# Patient Record
Sex: FEMALE | Race: BLACK OR AFRICAN AMERICAN | Employment: OTHER | URBAN - METROPOLITAN AREA
[De-identification: names, ages, dates, MRNs, and addresses within clinical notes are randomized per-mention and may not be internally consistent; named-entity substitution may affect disease eponyms.]

---

## 2017-01-30 ENCOUNTER — HOSPITAL ENCOUNTER (OUTPATIENT)
Dept: LAB | Age: 82
Discharge: HOME | End: 2017-01-30

## 2017-01-30 ENCOUNTER — OFFICE VISIT (OUTPATIENT)
Dept: INTERNAL MEDICINE CLINIC | Age: 82
End: 2017-01-30

## 2017-01-30 VITALS
OXYGEN SATURATION: 99 % | WEIGHT: 126 LBS | DIASTOLIC BLOOD PRESSURE: 63 MMHG | HEIGHT: 60 IN | TEMPERATURE: 98.5 F | SYSTOLIC BLOOD PRESSURE: 143 MMHG | BODY MASS INDEX: 24.74 KG/M2 | RESPIRATION RATE: 18 BRPM | HEART RATE: 71 BPM

## 2017-01-30 DIAGNOSIS — L60.2 LONG TOENAIL: ICD-10-CM

## 2017-01-30 DIAGNOSIS — R79.89 LOW TSH LEVEL: ICD-10-CM

## 2017-01-30 DIAGNOSIS — E87.0 HYPERNATREMIA: Primary | ICD-10-CM

## 2017-01-30 PROCEDURE — 99001 SPECIMEN HANDLING PT-LAB: CPT | Performed by: NURSE PRACTITIONER

## 2017-01-30 NOTE — PROGRESS NOTES
1. Have you been to the ER, urgent care clinic since your last visit? Hospitalized since your last visit? No    2. Have you seen or consulted any other health care providers outside of the [facility] since your last visit? Include any pap smears or colon screening.  No     Pt is here for   Chief Complaint   Patient presents with    Referral Request     pt states she would like a referral to Podiatry for her nails    Labs     pt states she's here for labs     Pt denies pain at this time

## 2017-01-30 NOTE — PROGRESS NOTES
Kris Jaeger is a 80 y.o. female and presents with Referral Request (pt states she would like a referral to Podiatry for her nails) and Labs (pt states she's here for labs)    Subjective:  Pt here to f/u labs. No HA, SOB, CP, or leg swelling. Continues to have knee pain and stiffness. Taking tramadol as needed for pain. Did NOT get lab letter, would like to review results today. Also needs referral to podiatry for toenails, very long and thick. Denies palpitations, skin/bowel changes, and fatigue; r/t low TSH finding. Review of Systems  Constitutional: negative for fevers, chills, anorexia and weight loss  Respiratory:  negative for cough, hemoptysis, dyspnea, and wheezing  CV:   negative for chest pain, palpitations, and lower extremity edema  GI:   negative for nausea, vomiting, diarrhea, abdominal pain, and melena  Endo:               negative for polyuria,polydipsia,polyphagia, and heat intolerance  Genitourinary: negative for frequency, urgency, dysuria, retention, and hematuria  Integument:  negative for rash, ulcerations  Hematologic:  negative for easy bruising and bleeding  Musculoskel: + arthralgias.  negative for muscle weakness  Neurological:  negative for headaches, dizziness, vertigo,and memory problems  Behavl/Psych: negative for feelings of anxiety, depression, suicide, and mood changes    Past Medical History   Diagnosis Date    Cancer ([facility] [address]      right breast.    Chronic pain     CVA     HTN (hypertension) 2/9/2011    Hypertension     Microscopic hematuria 5/11/2011    Other and unspecified hyperlipidemia 2/9/2011    Subclinical hyperthyroidism 8/29/2015    Venous thrombosis 2/17/2010     left arm DVT     Past Surgical History   Procedure Laterality Date    Hx hemorrhoidectomy      Hx heent      Pr breast surgery procedure unlisted       Social History     Social History    Marital status:      Spouse name: N/A    Number of children: N/A    Years of education: N/A Social History Main Topics    Smoking status: Former Smoker    Smokeless tobacco: Never Used      Comment: smoked for 10 yrs    Alcohol use 0.5 oz/week     1 Cans of beer per week      Comment: occ    Drug use: No    Sexual activity: No     Other Topics Concern    None     Social History Narrative     Family History   Problem Relation Age of Onset    Stroke Mother     Stroke Father     Diabetes Daughter      Current Outpatient Prescriptions   Medication Sig Dispense Refill    traMADol (ULTRAM) 50 mg tablet Take 1 Tab by mouth daily as needed for Pain. 30 Tab 5    ergocalciferol (ERGOCALCIFEROL) 50,000 unit capsule Take 1 Cap by mouth every seven (7) days. 12 Cap 4    polyethylene glycol (MIRALAX) 17 gram packet Take 1 Packet by mouth two (2) times daily as needed. 60 Packet 11    lactulose (CHRONULAC) 10 gram/15 mL solution Take 15 mL by mouth two (2) times daily as needed. For severe CONSTIPATION  Indications: CONSTIPATION 480 mL 1    ranitidine (ZANTAC) 150 mg tablet Take 1 Tab by mouth two (2) times daily as needed for Indigestion. Take for acid reflux 60 Tab 11    simvastatin (ZOCOR) 20 mg tablet Take 1 Tab by mouth nightly. 30 Tab 5    amLODIPine (NORVASC) 5 mg tablet Take 1 Tab by mouth daily. 30 Tab 11    metoprolol succinate (TOPROL-XL) 50 mg XL tablet Take 1 Tab by mouth daily. 30 Tab 11    lisinopril (PRINIVIL, ZESTRIL) 20 mg tablet Take 1 Tab by mouth daily. 30 Tab 11    gabapentin (NEURONTIN) 100 mg capsule TAKE 1 CAP BY MOUTH NIGHTLY AS NEEDED. 20 Cap 1    aspirin delayed-release 81 mg tablet Take 1 Tab by mouth daily. 90 Tab 3    mometasone (ELOCON) 0.1 % topical cream Apply  to affected area daily. 15 g 1    hydrocortisone (ANUSOL-HC) 2.5 % rectal cream Insert  into rectum four (4) times daily.  45 g 5     No Known Allergies    Objective:  Visit Vitals    /63 (BP 1 Location: Right arm, BP Patient Position: Sitting)    Pulse 71    Temp 98.5 °F (36.9 °C) (Oral)    Resp 18    Ht 5' (1.524 m)    Wt 126 lb (57.2 kg)    SpO2 99%    BMI 24.61 kg/m2     Physical Exam:    General appearance - alert, well appearing, and in no distress. Seated comfortably in W/C. Mental status - A/O x 4, normal mood and affect. Neck -Supple ,normal CSP. FROM, non-tender. No significant adenopathy/thyromegaly. No JVD. Chest - CTA. Symmetric chest rise. No wheezing, rales or rhonchi. Heart - Normal rate, regular rhythm. Normal S1, S2. No MGR or clicks. Abdomen - Soft,non-distended. Normoactive BS in all quadrants. NT, no mass or HSM. No CVAT. Ext- Radial, DP pulses, 2+ bilaterally. No pedal edema, clubbing, or cyanosis. Skin-Warm and dry. No clubbing or cyanosis. Hyperpigmentation to multiple nevi and skin tags. Neuro - Normal speech, no focal findings or movement disorder. Normal strength, gait, and muscle tone. Assessment/Plan:  See below for other orders   Follow-up Disposition:  Return in about 3 months (around 4/30/2017) for 3 month f/u and labs. ICD-10-CM ICD-9-CM    1. Hypernatremia G19.7 557.0 METABOLIC PANEL, BASIC   2. Low TSH level R94.6 794.5    3. Long toenail L60.9 703.8 REFERRAL TO PODIATRY     Orders Placed This Encounter    METABOLIC PANEL, BASIC    REFERRAL TO PODIATRY     Referral Priority:   Routine     Referral Type:   Consultation     Referral Reason:   Specialty Services Required     Referral Location:   [facility]     Referred to Provider:   Aurelia Haller, DPM [place] [facility] expressed understanding of plan. An After Visit Summary was offered/printed and given to the patient.

## 2017-01-30 NOTE — PATIENT INSTRUCTIONS
Low Sodium Diet (2,000 Milligram): Care Instructions  Your Care Instructions  Too much sodium causes your body to hold on to extra water. This can raise your blood pressure and force your heart and kidneys to work harder. In very serious cases, this could cause you to be put in the hospital. It might even be life-threatening. By limiting sodium, you will feel better and lower your risk of serious problems. The most common source of sodium is salt. People get most of the salt in their diet from canned, prepared, and packaged foods. Fast food and restaurant meals also are very high in sodium. Your doctor will probably limit your sodium to less than 2,000 milligrams (mg) a day. This limit counts all the sodium in prepared and packaged foods and any salt you add to your food. Follow-up care is a key part of your treatment and safety. Be sure to make and go to all appointments, and call your doctor if you are having problems. It's also a good idea to know your test results and keep a list of the medicines you take. How can you care for yourself at home? Read food labels  · Read labels on cans and food packages. The labels tell you how much sodium is in each serving. Make sure that you look at the serving size. If you eat more than the serving size, you have eaten more sodium. · Food labels also tell you the Percent Daily Value for sodium. Choose products with low Percent Daily Values for sodium. · Be aware that sodium can come in forms other than salt, including monosodium glutamate (MSG), sodium citrate, and sodium bicarbonate (baking soda). MSG is often added to Asian food. When you eat out, you can sometimes ask for food without MSG or added salt. Buy low-sodium foods  · Buy foods that are labeled \"unsalted\" (no salt added), \"sodium-free\" (less than 5 mg of sodium per serving), or \"low-sodium\" (less than 140 mg of sodium per serving).  Foods labeled \"reduced-sodium\" and \"light sodium\" may still have too much sodium. Be sure to read the label to see how much sodium you are getting. · Buy fresh vegetables, or frozen vegetables without added sauces. Buy low-sodium versions of canned vegetables, soups, and other canned goods. Prepare low-sodium meals  · Cut back on the amount of salt you use in cooking. This will help you adjust to the taste. Do not add salt after cooking. One teaspoon of salt has about 2,300 mg of sodium. · Take the salt shaker off the table. · Flavor your food with garlic, lemon juice, onion, vinegar, herbs, and spices. Do not use soy sauce, lite soy sauce, steak sauce, onion salt, garlic salt, celery salt, mustard, or ketchup on your food. · Use low-sodium salad dressings, sauces, and ketchup. Or make your own salad dressings and sauces without adding salt. · Use less salt (or none) when recipes call for it. You can often use half the salt a recipe calls for without losing flavor. Other foods such as rice, pasta, and grains do not need added salt. · Rinse canned vegetables, and cook them in fresh water. This removes some--but not all--of the salt. · Avoid water that is naturally high in sodium or that has been treated with water softeners, which add sodium. Call your local water company to find out the sodium content of your water supply. If you buy bottled water, read the label and choose a sodium-free brand. Avoid high-sodium foods  · Avoid eating:  ¨ Smoked, cured, salted, and canned meat, fish, and poultry. ¨ Ham, corona, hot dogs, and luncheon meats. ¨ Regular, hard, and processed cheese and regular peanut butter. ¨ Crackers with salted tops, and other salted snack foods such as pretzels, chips, and salted popcorn. ¨ Frozen prepared meals, unless labeled low-sodium. ¨ Canned and dried soups, broths, and bouillon, unless labeled sodium-free or low-sodium. ¨ Canned vegetables, unless labeled sodium-free or low-sodium. ¨ [employer] fries, pizza, tacos, and other fast foods.   Andrew Apple, olives, ketchup, and other condiments, especially soy sauce, unless labeled sodium-free or low-sodium. Where can you learn more? Go to http://paola-shane.info/. Enter L831 in the search box to learn more about \"Low Sodium Diet (2,000 Milligram): Care Instructions. \"  Current as of: July 26, 2016  Content Version: 11.1  © 4176-0767 [employer]. Care instructions adapted under license by [employer] (which disclaims liability or warranty for this information). If you have questions about a medical condition or this instruction, always ask your healthcare professional. [address] any warranty or liability for your use of this information. Thyroid-Stimulating Hormone (TSH) Test: About This Test  What is it? A thyroid-stimulating hormone (TSH) test is one of several blood tests used to check for thyroid gland problems. TSH causes the thyroid gland to make other important hormones that help control your body's metabolism. Why is this test done? This test is done to:  · Find out whether the thyroid gland is working properly. · Find out if a problem with the thyroid is causing symptoms such as tiredness, weight gain, or weight loss. · Keep track of how well thyroid treatment is working. How can you prepare for the test?  · In general, you dont need to prepare before having this test. Your doctor may give you some specific instructions. What happens during the test?  · A health professional takes a sample of your blood. What else should you know about the test?  · This test may be done at the same time as tests to measure other thyroid hormones. · Your results will include an explanation of what a \"normal\" result is. This is called a \"reference range. \" It is just a guide. Your doctor will evaluate your results based on your health and other factors.  This means that a value that falls outside the normal values listed may still be normal for you. How long does the test take? · The test will take a few minutes. What happens after the test?  · You will probably be able to go home right away. · You can go back to your usual activities right away. Follow-up care is a key part of your treatment and safety. Be sure to make and go to all appointments, and call your doctor if you are having problems. It's also a good idea to keep a list of the medicines you take. Ask your doctor when you can expect to have your test results. Where can you learn more? Go to http://paola-shane.info/. Enter F716 in the search box to learn more about \"Thyroid-Stimulating Hormone (TSH) Test: About This Test.\"  Current as of: July 28, 2016  Content Version: 11.1  © 4405-5001 [employer]. Care instructions adapted under license by [employer] (which disclaims liability or warranty for this information). If you have questions about a medical condition or this instruction, always ask your healthcare professional. [place] [address] any warranty or liability for your use of this information.

## 2017-01-30 NOTE — MR AVS SNAPSHOT
Visit Information Date & Time Provider Department Dept. Phone Encounter #  
 1/30/2017 11:00 AM Rommel Prabhakar NP [address] [facility] [phone] [id number] Follow-up Instructions Return in about 3 months (around 4/30/2017) for 3 month f/u and labs. Upcoming Health Maintenance Date Due  
 MEDICARE YEARLY EXAM 11/30/2017 GLAUCOMA SCREENING Q2Y 9/12/2018 DTaP/Tdap/Td series (2 - Td) 9/16/2026 Allergies as of 1/30/2017  Review Complete On: 1/30/2017 By: Eleni Vergara. DAPHNE Bar No Known Allergies Current Immunizations  Never Reviewed No immunizations on file. Not reviewed this visit You Were Diagnosed With   
  
 Codes Comments Hypernatremia    -  Primary ICD-10-CM: E87.0 ICD-9-CM: 276.0 Low TSH level     ICD-10-CM: R94.6 ICD-9-CM: 794.5 Long toenail     ICD-10-CM: L60.9 ICD-9-CM: 703.8 Vitals BP Pulse Temp Resp Height(growth percentile) Weight(growth percentile) 143/63 (BP 1 Location: Right arm, BP Patient Position: Sitting) 71 98.5 °F (36.9 °C) (Oral) 18 5' (1.524 m) 126 lb (57.2 kg) SpO2 BMI OB Status Smoking Status 99% 24.61 kg/m2 Postmenopausal Former Smoker Vitals History BMI and BSA Data Body Mass Index Body Surface Area  
 24.61 kg/m 2 1.56 m 2 Preferred Pharmacy Pharmacy Name Phone [phone] [phone] Your Updated Medication List  
  
   
This list is accurate as of: 1/30/17 12:13 PM.  Always use your most recent med list. amLODIPine 5 mg tablet Commonly known as:  Bibiana Fraction Take 1 Tab by mouth daily. aspirin delayed-release 81 mg tablet Take 1 Tab by mouth daily. ergocalciferol 50,000 unit capsule Commonly known as:  ERGOCALCIFEROL Take 1 Cap by mouth every seven (7) days. gabapentin 100 mg capsule Commonly known as:  NEURONTIN  
 TAKE 1 CAP BY MOUTH NIGHTLY AS NEEDED. hydrocortisone 2.5 % rectal cream  
Commonly known as:  ANUSOL-HC Insert  into rectum four (4) times daily. lactulose 10 gram/15 mL solution Commonly known as:  Charmian Goltz Take 15 mL by mouth two (2) times daily as needed. For severe CONSTIPATION  Indications: CONSTIPATION  
  
 lisinopril 20 mg tablet Commonly known as:  Katlyn Posey Take 1 Tab by mouth daily. metoprolol succinate 50 mg XL tablet Commonly known as:  TOPROL-XL Take 1 Tab by mouth daily. mometasone 0.1 % topical cream  
Commonly known as:  Yevonne Littler Apply  to affected area daily. polyethylene glycol 17 gram packet Commonly known as:  Rodriguez Jie Take 1 Packet by mouth two (2) times daily as needed. raNITIdine 150 mg tablet Commonly known as:  ZANTAC Take 1 Tab by mouth two (2) times daily as needed for Indigestion. Take for acid reflux  
  
 simvastatin 20 mg tablet Commonly known as:  ZOCOR Take 1 Tab by mouth nightly. traMADol 50 mg tablet Commonly known as:  ULTRAM  
Take 1 Tab by mouth daily as needed for Pain. We Performed the Following METABOLIC PANEL, BASIC [45259 CPT(R)] REFERRAL TO PODIATRY [REF90 Custom] Comments:  
 Diabetic foot care Follow-up Instructions Return in about 3 months (around 4/30/2017) for 3 month f/u and labs. Referral Information Referral ID Referred By Referred To  
  
 [id number] Linda Cardenas I [facility] [address] [place], [address] Visits Status Start Date End Date 1 New Request 1/30/17 1/30/18 If your referral has a status of pending review or denied, additional information will be sent to support the outcome of this decision. Patient Instructions Low Sodium Diet (2,000 Milligram): Care Instructions Your Care Instructions Too much sodium causes your body to hold on to extra water.  This can raise your blood pressure and force your heart and kidneys to work harder. In very serious cases, this could cause you to be put in the hospital. It might even be life-threatening. By limiting sodium, you will feel better and lower your risk of serious problems. The most common source of sodium is salt. People get most of the salt in their diet from canned, prepared, and packaged foods. Fast food and restaurant meals also are very high in sodium. Your doctor will probably limit your sodium to less than 2,000 milligrams (mg) a day. This limit counts all the sodium in prepared and packaged foods and any salt you add to your food. Follow-up care is a key part of your treatment and safety. Be sure to make and go to all appointments, and call your doctor if you are having problems. It's also a good idea to know your test results and keep a list of the medicines you take. How can you care for yourself at home? Read food labels · Read labels on cans and food packages. The labels tell you how much sodium is in each serving. Make sure that you look at the serving size. If you eat more than the serving size, you have eaten more sodium. · Food labels also tell you the Percent Daily Value for sodium. Choose products with low Percent Daily Values for sodium. · Be aware that sodium can come in forms other than salt, including monosodium glutamate (MSG), sodium citrate, and sodium bicarbonate (baking soda). MSG is often added to Asian food. When you eat out, you can sometimes ask for food without MSG or added salt. Buy low-sodium foods · Buy foods that are labeled \"unsalted\" (no salt added), \"sodium-free\" (less than 5 mg of sodium per serving), or \"low-sodium\" (less than 140 mg of sodium per serving). Foods labeled \"reduced-sodium\" and \"light sodium\" may still have too much sodium. Be sure to read the label to see how much sodium you are getting. · Buy fresh vegetables, or frozen vegetables without added sauces.  Buy low-sodium versions of canned vegetables, soups, and other canned goods. Prepare low-sodium meals · Cut back on the amount of salt you use in cooking. This will help you adjust to the taste. Do not add salt after cooking. One teaspoon of salt has about 2,300 mg of sodium. · Take the salt shaker off the table. · Flavor your food with garlic, lemon juice, onion, vinegar, herbs, and spices. Do not use soy sauce, lite soy sauce, steak sauce, onion salt, garlic salt, celery salt, mustard, or ketchup on your food. · Use low-sodium salad dressings, sauces, and ketchup. Or make your own salad dressings and sauces without adding salt. · Use less salt (or none) when recipes call for it. You can often use half the salt a recipe calls for without losing flavor. Other foods such as rice, pasta, and grains do not need added salt. · Rinse canned vegetables, and cook them in fresh water. This removes somebut not allof the salt. · Avoid water that is naturally high in sodium or that has been treated with water softeners, which add sodium. Call your local water company to find out the sodium content of your water supply. If you buy bottled water, read the label and choose a sodium-free brand. Avoid high-sodium foods · Avoid eating: ¨ Smoked, cured, salted, and canned meat, fish, and poultry. ¨ Ham, corona, hot dogs, and luncheon meats. ¨ Regular, hard, and processed cheese and regular peanut butter. ¨ Crackers with salted tops, and other salted snack foods such as pretzels, chips, and salted popcorn. ¨ Frozen prepared meals, unless labeled low-sodium. ¨ Canned and dried soups, broths, and bouillon, unless labeled sodium-free or low-sodium. ¨ Canned vegetables, unless labeled sodium-free or low-sodium. ¨ Western Tamar fries, pizza, tacos, and other fast foods. ¨ Pickles, olives, ketchup, and other condiments, especially soy sauce, unless labeled sodium-free or low-sodium. Where can you learn more? Go to http://paola-shane.info/. Enter P907 in the search box to learn more about \"Low Sodium Diet (2,000 Milligram): Care Instructions. \" Current as of: July 26, 2016 Content Version: 11.1 © 9042-3972 [employer]. Care instructions adapted under license by [employer] (which disclaims liability or warranty for this information). If you have questions about a medical condition or this instruction, always ask your healthcare professional. [address] any warranty or liability for your use of this information. Thyroid-Stimulating Hormone (TSH) Test: About This Test 
What is it? A thyroid-stimulating hormone (TSH) test is one of several blood tests used to check for thyroid gland problems. TSH causes the thyroid gland to make other important hormones that help control your body's metabolism. Why is this test done? This test is done to: · Find out whether the thyroid gland is working properly. · Find out if a problem with the thyroid is causing symptoms such as tiredness, weight gain, or weight loss. · Keep track of how well thyroid treatment is working. How can you prepare for the test? 
· In general, you dont need to prepare before having this test. Your doctor may give you some specific instructions. What happens during the test? 
· A health professional takes a sample of your blood. What else should you know about the test? 
· This test may be done at the same time as tests to measure other thyroid hormones. · Your results will include an explanation of what a \"normal\" result is. This is called a \"reference range. \" It is just a guide. Your doctor will evaluate your results based on your health and other factors. This means that a value that falls outside the normal values listed may still be normal for you. How long does the test take? · The test will take a few minutes.  
What happens after the test? 
 · You will probably be able to go home right away. · You can go back to your usual activities right away. Follow-up care is a key part of your treatment and safety. Be sure to make and go to all appointments, and call your doctor if you are having problems. It's also a good idea to keep a list of the medicines you take. Ask your doctor when you can expect to have your test results. Where can you learn more? Go to http://paola-shane.info/. Enter P897 in the search box to learn more about \"Thyroid-Stimulating Hormone (TSH) Test: About This Test.\" Current as of: July 28, 2016 Content Version: 11.1 © 1527-8180 [employer]. Care instructions adapted under license by [employer] (which disclaims liability or warranty for this information). If you have questions about a medical condition or this instruction, always ask your healthcare professional. [address] any warranty or liability for your use of this information. Introducing [facility]! [facility] introduces "[employer] patient portal. Now you can access parts of your medical record, email your doctor's office, and request medication refills online. 1. In your internet browser, go to [email or web address] [email or web address] Finance [email or web address] 2. Click on the First Time User? Click Here link in the Sign In box. You will see the New Member Sign Up page. 3. Enter your "[employer] Access Code exactly as it appears below. You will not need to use this code after youve completed the sign-up process. If you do not sign up before the expiration date, you must request a new code. · "[employer] Access Code: [email or web address] Expires: 4/27/2017  3:26 PM 
 
4. Enter the last four digits of your Social Security Number (xxxx) and Date of Birth (mm/dd/yyyy) as indicated and click Submit. You will be taken to the next sign-up page. 5. Create a "[employer] ID.  This will be your "[employer] login ID and cannot be changed, so think of one that is secure and easy to remember. 6. Create a Zackfire.[email or web address] password. You can change your password at any time. 7. Enter your Password Reset Question and Answer. This can be used at a later time if you forget your password. 8. Enter your e-mail address. You will receive e-mail notification when new information is available in [address] 9. Click Sign Up. You can now view and download portions of your medical record. 10. Click the Download Summary menu link to download a portable copy of your medical information. If you have questions, please visit the Frequently Asked Questions section of the [email or web address] website. Remember, [email or web address] is NOT to be used for urgent needs. For medical emergencies, dial 911. Now available from your iPhone and Android! Please provide this summary of care documentation to your next provider. Your primary care clinician is listed as CATHRYN Anderson. If you have any questions after today's visit, please call [phone].

## 2017-01-31 DIAGNOSIS — R79.89 LOW TSH LEVEL: Primary | ICD-10-CM

## 2017-01-31 LAB
BUN SERPL-MCNC: 11 MG/DL (ref 8–27)
BUN/CREAT SERPL: 19 (ref 11–26)
CALCIUM SERPL-MCNC: 9.2 MG/DL (ref 8.7–10.3)
CHLORIDE SERPL-SCNC: 103 MMOL/L (ref 96–106)
CO2 SERPL-SCNC: 26 MMOL/L (ref 18–29)
CREAT SERPL-MCNC: 0.59 MG/DL (ref 0.57–1)
GLUCOSE SERPL-MCNC: 103 MG/DL (ref 65–99)
POTASSIUM SERPL-SCNC: 4.1 MMOL/L (ref 3.5–5.2)
SODIUM SERPL-SCNC: 143 MMOL/L (ref 134–144)
T3 SERPL-MCNC: 198 NG/DL (ref 71–180)
T4 FREE SERPL-MCNC: 2.26 NG/DL (ref 0.82–1.77)
THYROGLOB AB SERPL-ACNC: <1 IU/ML (ref 0–0.9)
THYROPEROXIDASE AB SERPL-ACNC: 8 IU/ML (ref 0–34)
TSH SERPL DL<=0.005 MIU/L-ACNC: <0.006 UIU/ML (ref 0.45–4.5)

## 2017-02-03 ENCOUNTER — TELEPHONE (OUTPATIENT)
Dept: INTERNAL MEDICINE CLINIC | Age: 82
End: 2017-02-03

## 2017-02-03 NOTE — TELEPHONE ENCOUNTER
Pt has appt to see  Dr. Gopi Shaw on 2/10/17 @ 11:00am for thyroid problem and needs Placido Holloway referral please.  Pt # [phone]

## 2017-02-13 DIAGNOSIS — E78.2 MIXED HYPERLIPIDEMIA: ICD-10-CM

## 2017-02-13 RX ORDER — SIMVASTATIN 20 MG/1
20 TABLET, FILM COATED ORAL
Qty: 30 TAB | Refills: 11 | Status: SHIPPED | OUTPATIENT
Start: 2017-02-13 | End: 2018-03-16 | Stop reason: SDUPTHER

## 2017-04-10 RX ORDER — LISINOPRIL 20 MG/1
20 TABLET ORAL DAILY
Qty: 90 TAB | Refills: 0 | Status: SHIPPED | OUTPATIENT
Start: 2017-04-10 | End: 2017-07-17 | Stop reason: SDUPTHER

## 2017-05-01 ENCOUNTER — OFFICE VISIT (OUTPATIENT)
Dept: INTERNAL MEDICINE CLINIC | Age: 82
End: 2017-05-01

## 2017-05-01 VITALS
RESPIRATION RATE: 18 BRPM | DIASTOLIC BLOOD PRESSURE: 76 MMHG | BODY MASS INDEX: 25.72 KG/M2 | SYSTOLIC BLOOD PRESSURE: 148 MMHG | HEIGHT: 60 IN | WEIGHT: 131 LBS | HEART RATE: 82 BPM | TEMPERATURE: 98.2 F | OXYGEN SATURATION: 97 %

## 2017-05-01 DIAGNOSIS — K59.01 SLOW TRANSIT CONSTIPATION: ICD-10-CM

## 2017-05-01 DIAGNOSIS — M25.561 BILATERAL CHRONIC KNEE PAIN: ICD-10-CM

## 2017-05-01 DIAGNOSIS — Z71.89 ADVANCE CARE PLANNING: ICD-10-CM

## 2017-05-01 DIAGNOSIS — I10 ESSENTIAL HYPERTENSION: ICD-10-CM

## 2017-05-01 DIAGNOSIS — M25.562 BILATERAL CHRONIC KNEE PAIN: ICD-10-CM

## 2017-05-01 DIAGNOSIS — Z00.00 MEDICARE ANNUAL WELLNESS VISIT, SUBSEQUENT: Primary | ICD-10-CM

## 2017-05-01 DIAGNOSIS — G89.29 BILATERAL CHRONIC KNEE PAIN: ICD-10-CM

## 2017-05-01 RX ORDER — ANASTROZOLE 1 MG/1
TABLET ORAL
Refills: 3 | COMMUNITY
Start: 2017-03-13 | End: 2021-01-27 | Stop reason: SDUPTHER

## 2017-05-01 RX ORDER — LACTULOSE 10 G/15ML
10 SOLUTION ORAL; RECTAL
Qty: 480 ML | Refills: 1 | Status: SHIPPED | OUTPATIENT
Start: 2017-05-01 | End: 2017-07-17 | Stop reason: SDUPTHER

## 2017-05-01 RX ORDER — DOCUSATE SODIUM 100 MG/1
CAPSULE, LIQUID FILLED ORAL
Refills: 2 | COMMUNITY
Start: 2017-03-13 | End: 2017-07-17 | Stop reason: SDUPTHER

## 2017-05-01 NOTE — PATIENT INSTRUCTIONS
Eat a balanced diet, low-carb, low-salt AND exercise at least 150 minutes per week of moderate activity. If you begin to feel short of breath, dizzy, lightheaded, or have chest pain; STOP. If your symptoms DO NOT resolve after several minutes, DO NOT resume activity; you may need to call the office, report to an urgent care facility, or ER for chest pain. Advance Directives: Care Instructions  Your Care Instructions  An advance directive is a legal way to state your wishes at the end of your life. It tells your family and your doctor what to do if you can no longer say what you want. There are two main types of advance directives. You can change them any time that your wishes change. · A living will tells your family and your doctor your wishes about life support and other treatment. · A durable power of  for health care lets you name a person to make treatment decisions for you when you can't speak for yourself. This person is called a health care agent. If you do not have an advance directive, decisions about your medical care may be made by a doctor or a  who doesn't know you. It may help to think of an advance directive as a gift to the people who care for you. If you have one, they won't have to make tough decisions by themselves. Follow-up care is a key part of your treatment and safety. Be sure to make and go to all appointments, and call your doctor if you are having problems. It's also a good idea to know your test results and keep a list of the medicines you take. How can you care for yourself at home? · Discuss your wishes with your loved ones and your doctor. This way, there are no surprises. · Many states have a unique form. Or you might use a universal form that has been approved by many states. This kind of form can sometimes be completed and stored online. Your electronic copy will then be available wherever you have a connection to the Internet.  In most cases, doctors will respect your wishes even if you have a form from a different state. · You don't need a  to do an advance directive. But you may want to get legal advice. · Think about these questions when you prepare an advance directive:  ¨ Who do you want to make decisions about your medical care if you are not able to? Many people choose a family member or close friend. ¨ Do you know enough about life support methods that might be used? If not, talk to your doctor so you understand. ¨ What are you most afraid of that might happen? You might be afraid of having pain, losing your independence, or being kept alive by machines. ¨ Where would you prefer to die? Choices include your home, a hospital, or a nursing home. ¨ Would you like to have information about hospice care to support you and your family? ¨ Do you want to donate organs when you die? ¨ Do you want certain Bahai practices performed before you die? If so, put your wishes in the advance directive. · Read your advance directive every year, and make changes as needed. When should you call for help? Be sure to contact your doctor if you have any questions. Where can you learn more? Go to http://paola-shane.info/. Enter R264 in the search box to learn more about \"Advance Directives: Care Instructions. \"  Current as of: November 17, 2016  Content Version: 11.2  © 2335-1008 [employer]wise, Incorporated. Care instructions adapted under license by [employer] (which disclaims liability or warranty for this information). If you have questions about a medical condition or this instruction, always ask your healthcare professional. [place] [address] any warranty or liability for your use of this information. Constipation: Care Instructions  Your Care Instructions  Constipation means that you have a hard time passing stools (bowel movements). People pass stools from 3 times a day to once every 3 days.  What is normal for you may be different. Constipation may occur with pain in the rectum and cramping. The pain may get worse when you try to pass stools. Sometimes there are small amounts of bright red blood on toilet paper or the surface of stools. This is because of enlarged veins near the rectum (hemorrhoids). A few changes in your diet and lifestyle may help you avoid ongoing constipation. Your doctor may also prescribe medicine to help loosen your stool. Some medicines can cause constipation. These include pain medicines and antidepressants. Tell your doctor about all the medicines you take. Your doctor may want to make a medicine change to ease your symptoms. Follow-up care is a key part of your treatment and safety. Be sure to make and go to all appointments, and call your doctor if you are having problems. It's also a good idea to know your test results and keep a list of the medicines you take. How can you care for yourself at home? · Drink plenty of fluids, enough so that your urine is light yellow or clear like water. If you have kidney, heart, or liver disease and have to limit fluids, talk with your doctor before you increase the amount of fluids you drink. · Include high-fiber foods in your diet each day. These include fruits, vegetables, beans, and whole grains. · Get at least 30 minutes of exercise on most days of the week. Walking is a good choice. You also may want to do other activities, such as running, swimming, cycling, or playing tennis or team sports. · Take a fiber supplement, such as Citrucel or Metamucil, every day. Read and follow all instructions on the label. · Schedule time each day for a bowel movement. A daily routine may help. Take your time having your bowel movement. · Support your feet with a small step stool when you sit on the toilet. This helps flex your hips and places your pelvis in a squatting position.   · Your doctor may recommend an over-the-counter laxative to relieve your constipation. Examples are Milk of Magnesia and MiraLax. Read and follow all instructions on the label. Do not use laxatives on a long-term basis. When should you call for help? Call your doctor now or seek immediate medical care if:  · You have new or worse belly pain. · You have new or worse nausea or vomiting. · You have blood in your stools. Watch closely for changes in your health, and be sure to contact your doctor if:  · Your constipation is getting worse. · You do not get better as expected. Where can you learn more? Go to http://paola-shane.info/. Enter 21  in the search box to learn more about \"Constipation: Care Instructions. \"  Current as of: May 27, 2016  Content Version: 11.2  © 6071-4193 [employer]. Care instructions adapted under license by [employer] (which disclaims liability or warranty for this information). If you have questions about a medical condition or this instruction, always ask your healthcare professional. [place] [address] any warranty or liability for your use of this information.

## 2017-05-01 NOTE — MR AVS SNAPSHOT
Visit Information Date & Time Provider Department Dept. Phone Encounter #  
 5/1/2017 11:00 AM Sheridan Camara NP [address] [facility] [phone] [id number] Follow-up Instructions Return in about 5 months (around 10/1/2017) for lipids, labs, HTN. Your Appointments 6/23/2017 10:30 AM  
ESTABLISHED PATIENT with MD Michael De Leon TURNER, [facility] ([facility]) Appt Note: 2 1/2 month f/u  
 [phone]  
[phone]  
  
   
 [address] [employer] [facility] Alerts [phone] Upcoming Health Maintenance Date Due Pneumococcal 65+ Low/Medium Risk (1 of 2 - PCV13) 7/1/1995 INFLUENZA AGE 9 TO ADULT 8/1/2017 MEDICARE YEARLY EXAM 11/30/2017 GLAUCOMA SCREENING Q2Y 9/12/2018 DTaP/Tdap/Td series (2 - Td) 9/16/2026 Allergies as of 5/1/2017  Review Complete On: 5/1/2017 By: Sheridan Camara NP No Known Allergies Current Immunizations  Never Reviewed No immunizations on file. Not reviewed this visit You Were Diagnosed With   
  
 Codes Comments Essential hypertension    -  Primary ICD-10-CM: I10 
ICD-9-CM: 401.9 Slow transit constipation     ICD-10-CM: K59.01 
ICD-9-CM: 564.01 Bilateral chronic knee pain     ICD-10-CM: M25.561, M25.562, G89.29 ICD-9-CM: 719.46, 338.29 Vitals BP Pulse Temp Resp Height(growth percentile) Weight(growth percentile) 148/76 (BP 1 Location: Right arm, BP Patient Position: Sitting) 82 98.2 °F (36.8 °C) (Oral) 18 5' (1.524 m) 131 lb (59.4 kg) SpO2 BMI OB Status Smoking Status 97% 25.58 kg/m2 Postmenopausal Former Smoker Vitals History BMI and BSA Data Body Mass Index Body Surface Area 25.58 kg/m 2 1.59 m 2 Preferred Pharmacy Pharmacy Name Phone [id number] [place], [phone] [facility] [phone] Your Updated Medication List  
  
   
 This list is accurate as of: 5/1/17 12:46 PM.  Always use your most recent med list. amLODIPine 5 mg tablet Commonly known as:  Leila Santos Take 1 Tab by mouth daily. anastrozole 1 mg tablet Commonly known as:  ARIMIDEX TAKE 1 TABLET BY MOUTH DAILY  
  
 aspirin delayed-release 81 mg tablet Take 1 Tab by mouth daily. docusate sodium 100 mg capsule Commonly known as:  COLACE  
TAKE ONE CAPSULE BY MOUTH TWICE A DAY  
  
 ergocalciferol 50,000 unit capsule Commonly known as:  ERGOCALCIFEROL Take 1 Cap by mouth every seven (7) days. gabapentin 100 mg capsule Commonly known as:  NEURONTIN  
TAKE 1 CAP BY MOUTH NIGHTLY AS NEEDED. hydrocortisone 2.5 % rectal cream  
Commonly known as:  ANUSOL-HC Insert  into rectum four (4) times daily. lactulose 10 gram/15 mL solution Commonly known as:  Tilmon Hey Take 15 mL by mouth two (2) times daily as needed. For severe CONSTIPATION  Indications: Constipation  
  
 lisinopril 20 mg tablet Commonly known as:  Mele Camel Take 1 Tab by mouth daily. methIMAzole 5 mg tablet Commonly known as:  TAPAZOLE Take 1 Tab by mouth daily. metoprolol succinate 50 mg XL tablet Commonly known as:  TOPROL-XL Take 1 Tab by mouth daily. mometasone 0.1 % topical cream  
Commonly known as:  Audria Gun Apply  to affected area daily. pneumococcal 13 rea conj dip 0.5 mL Syrg injection Commonly known as:  PREVNAR-13  
0.5 mL by IntraMUSCular route once for 1 dose. polyethylene glycol 17 gram packet Commonly known as:  Ellan Grout Take 1 Packet by mouth two (2) times daily as needed. raNITIdine 150 mg tablet Commonly known as:  ZANTAC Take 1 Tab by mouth two (2) times daily as needed for Indigestion. Take for acid reflux  
  
 simvastatin 20 mg tablet Commonly known as:  ZOCOR Take 1 Tab by mouth nightly. traMADol 50 mg tablet Commonly known as:  Genaro Booker  
 Take 1 Tab by mouth daily as needed for Pain. Prescriptions Sent to Pharmacy Refills  
 lactulose (CHRONULAC) 10 gram/15 mL solution 1 Sig: Take 15 mL by mouth two (2) times daily as needed. For severe CONSTIPATION  Indications: Constipation Class: Normal  
 Pharm[place] [facility] [place] [address] Ph #: [phone] Route: Oral  
 pneumococcal 13 rea conj dip (PREVNAR-13) 0.5 mL syrg injection 0 Si.5 mL by IntraMUSCular route once for 1 dose. Class: Normal  
 Pharmacy: [place] [address] Ph #: [phone] Route: IntraMUSCular Follow-up Instructions Return in about 5 months (around 10/1/2017) for lipids, labs, HTN. Patient Instructions Eat a balanced diet, low-carb, low-salt AND exercise at least 150 minutes per week of moderate activity. If you begin to feel short of breath, dizzy, lightheaded, or have chest pain; STOP. If your symptoms DO NOT resolve after several minutes, DO NOT resume activity; you may need to call the office, report to an urgent care facility, or ER for chest pain. Advance Directives: Care Instructions Your Care Instructions An advance directive is a legal way to state your wishes at the end of your life. It tells your family and your doctor what to do if you can no longer say what you want. There are two main types of advance directives. You can change them any time that your wishes change. · A living will tells your family and your doctor your wishes about life support and other treatment. · A durable power of  for health care lets you name a person to make treatment decisions for you when you can't speak for yourself. This person is called a health care agent. If you do not have an advance directive, decisions about your medical care may be made by a doctor or a  who doesn't know you. It may help to think of an advance directive as a gift to the people who care for you. If you have one, they won't have to make tough decisions by themselves. Follow-up care is a key part of your treatment and safety. Be sure to make and go to all appointments, and call your doctor if you are having problems. It's also a good idea to know your test results and keep a list of the medicines you take. How can you care for yourself at home? · Discuss your wishes with your loved ones and your doctor. This way, there are no surprises. · Many states have a unique form. Or you might use a universal form that has been approved by many states. This kind of form can sometimes be completed and stored online. Your electronic copy will then be available wherever you have a connection to the Internet. In most cases, doctors will respect your wishes even if you have a form from a different state. · You don't need a  to do an advance directive. But you may want to get legal advice. · Think about these questions when you prepare an advance directive: ¨ Who do you want to make decisions about your medical care if you are not able to? Many people choose a family member or close friend. ¨ Do you know enough about life support methods that might be used? If not, talk to your doctor so you understand. ¨ What are you most afraid of that might happen? You might be afraid of having pain, losing your independence, or being kept alive by machines. ¨ Where would you prefer to die? Choices include your home, a hospital, or a nursing home. ¨ Would you like to have information about hospice care to support you and your family? ¨ Do you want to donate organs when you die? ¨ Do you want certain [employer] practices performed before you die? If so, put your wishes in the advance directive. · Read your advance directive every year, and make changes as needed. When should you call for help? Be sure to contact your doctor if you have any questions. Where can you learn more? Go to http://paola-shane.info/. Enter R264 in the search box to learn more about \"Advance Directives: Care Instructions. \" Current as of: November 17, 2016 Content Version: 11.2 © 5850-8975 [employer]. Care instructions adapted under license by [employer] (which disclaims liability or warranty for this information). If you have questions about a medical condition or this instruction, always ask your healthcare professional. [address] any warranty or liability for your use of this information. Constipation: Care Instructions Your Care Instructions Constipation means that you have a hard time passing stools (bowel movements). People pass stools from 3 times a day to once every 3 days. What is normal for you may be different. Constipation may occur with pain in the rectum and cramping. The pain may get worse when you try to pass stools. Sometimes there are small amounts of bright red blood on toilet paper or the surface of stools. This is because of enlarged veins near the rectum (hemorrhoids). A few changes in your diet and lifestyle may help you avoid ongoing constipation. Your doctor may also prescribe medicine to help loosen your stool. Some medicines can cause constipation. These include pain medicines and antidepressants. Tell your doctor about all the medicines you take. Your doctor may want to make a medicine change to ease your symptoms. Follow-up care is a key part of your treatment and safety. Be sure to make and go to all appointments, and call your doctor if you are having problems. It's also a good idea to know your test results and keep a list of the medicines you take. How can you care for yourself at home?  
· Drink plenty of fluids, enough so that your urine is light yellow or clear like water. If you have kidney, heart, or liver disease and have to limit fluids, talk with your doctor before you increase the amount of fluids you drink. · Include high-fiber foods in your diet each day. These include fruits, vegetables, beans, and whole grains. · Get at least 30 minutes of exercise on most days of the week. Walking is a good choice. You also may want to do other activities, such as running, swimming, cycling, or playing tennis or team sports. · Take a fiber supplement, such as Citrucel or Metamucil, every day. Read and follow all instructions on the label. · Schedule time each day for a bowel movement. A daily routine may help. Take your time having your bowel movement. · Support your feet with a small step stool when you sit on the toilet. This helps flex your hips and places your pelvis in a squatting position. · Your doctor may recommend an over-the-counter laxative to relieve your constipation. Examples are Milk of Magnesia and MiraLax. Read and follow all instructions on the label. Do not use laxatives on a long-term basis. When should you call for help? Call your doctor now or seek immediate medical care if: 
· You have new or worse belly pain. · You have new or worse nausea or vomiting. · You have blood in your stools. Watch closely for changes in your health, and be sure to contact your doctor if: 
· Your constipation is getting worse. · You do not get better as expected. Where can you learn more? Go to http://paola-shane.info/. Enter 21 [phone] in the search box to learn more about \"Constipation: Care Instructions. \" Current as of: May 27, 2016 Content Version: 11.2 © 4012-5180 [employer]. Care instructions adapted under license by [employer] (which disclaims liability or warranty for this information).  If you have questions about a medical condition or this instruction, always ask your healthcare professional. [employer] disclaims any warranty or liability for your use of this information. Introducing [facility]! [facility] introduces GridCure patient portal. Now you can access parts of your medical record, email your doctor's office, and request medication refills online. 1. In your internet browser, go to [email or web address]. [employer] technologyt 2. Click on the First Time User? Click Here link in the Sign In box. You will see the New Member Sign Up page. 3. Enter your GridCure Access Code exactly as it appears below. You will not need to use this code after youve completed the sign-up process. If you do not sign up before the expiration date, you must request a new code. · GridCure Access Code: [email or web address] Expires: 7/30/2017 11:16 AM 
 
4. Enter the last four digits of your Social Security Number (xxxx) and Date of Birth (mm/dd/yyyy) as indicated and click Submit. You will be taken to the next sign-up page. 5. Create a GridCure ID. This will be your GridCure login ID and cannot be changed, so think of one that is secure and easy to remember. 6. Create a GridCure password. You can change your password at any time. 7. Enter your Password Reset Question and Answer. This can be used at a later time if you forget your password. 8. Enter your e-mail address. You will receive e-mail notification when new information is available in [address] 9. Click Sign Up. You can now view and download portions of your medical record. 10. Click the Download Summary menu link to download a portable copy of your medical information. If you have questions, please visit the Frequently Asked Questions section of the GridCure website. Remember, GridCure is NOT to be used for urgent needs. For medical emergencies, dial 911. Now available from your iPhone and Android! Please provide this summary of care documentation to your next provider. Your primary care clinician is listed as CATHRYN Novak. If you have any questions after today's visit, please call [phone].

## 2017-05-01 NOTE — PROGRESS NOTES
Misael Zuñiga is a 80 y.o. female and presents with Thyroid Problem (follow up)    Subjective:  Pt here to f/u thyroid issue, but under care of Dr. Rickey Keys. On methimazole. Also here for [address]     Review of Systems  Constitutional: negative for fevers, chills, anorexia and weight loss  Respiratory:  negative for cough, hemoptysis, dyspnea, and wheezing  CV:   negative for chest pain, palpitations, and lower extremity edema  GI:   negative for nausea, vomiting, diarrhea, abdominal pain, and melena  Endo:               negative for polyuria,polydipsia,polyphagia, and heat intolerance  Integument:  negative for rash, ulcerations, and pruritus  Hematologic:  negative for easy bruising and bleeding  Musculoskel: + arthralgias and joint pain/swelling. negative for muscle weakness,  Neurological:  negative for headaches, dizziness, vertigo,and memory/gait problemsBehavl/Psych: negative for feelings of anxiety, depression, suicide, and mood changes    Past Medical History:   Diagnosis Date    Cancer ([address]     right breast.    Chronic pain     CVA     HTN (hypertension) 2/9/2011    Hypertension     Microscopic hematuria 5/11/2011    Other and unspecified hyperlipidemia 2/9/2011    Subclinical hyperthyroidism 8/29/2015    Venous thrombosis 2/17/2010    left arm DVT     Past Surgical History:   Procedure Laterality Date    BREAST SURGERY PROCEDURE UNLISTED      HX HEENT      HX HEMORRHOIDECTOMY       Social History     Social History    Marital status:      Spouse name: N/A    Number of children: N/A    Years of education: N/A     Social History Main Topics    Smoking status: Former Smoker    Smokeless tobacco: Never Used      Comment: smoked for 10 yrs    Alcohol use 0.5 oz/week     1 Cans of beer per week      Comment: occ    Drug use: No    Sexual activity: No     Other Topics Concern    None     Social History Narrative     Family History   Problem Relation Age of Onset    Stroke Mother    Henrique Maher Stroke Father     Diabetes Daughter      Current Outpatient Prescriptions   Medication Sig Dispense Refill    anastrozole (ARIMIDEX) 1 mg tablet TAKE 1 TABLET BY MOUTH DAILY  3    docusate sodium (COLACE) 100 mg capsule TAKE ONE CAPSULE BY MOUTH TWICE A DAY  2    lactulose (CHRONULAC) 10 gram/15 mL solution Take 15 mL by mouth two (2) times daily as needed. For severe CONSTIPATION  Indications: Constipation 480 mL 1    pneumococcal 13 rea conj dip (PREVNAR-13) 0.5 mL syrg injection 0.5 mL by IntraMUSCular route once for 1 dose. 0.5 mL 0    lisinopril (PRINIVIL, ZESTRIL) 20 mg tablet Take 1 Tab by mouth daily. 90 Tab 0    methIMAzole (TAPAZOLE) 5 mg tablet Take 1 Tab by mouth daily. 30 Tab 11    simvastatin (ZOCOR) 20 mg tablet Take 1 Tab by mouth nightly. 30 Tab 11    traMADol (ULTRAM) 50 mg tablet Take 1 Tab by mouth daily as needed for Pain. 30 Tab 5    ergocalciferol (ERGOCALCIFEROL) 50,000 unit capsule Take 1 Cap by mouth every seven (7) days. 12 Cap 4    polyethylene glycol (MIRALAX) 17 gram packet Take 1 Packet by mouth two (2) times daily as needed. 60 Packet 11    mometasone (ELOCON) 0.1 % topical cream Apply  to affected area daily. 15 g 1    ranitidine (ZANTAC) 150 mg tablet Take 1 Tab by mouth two (2) times daily as needed for Indigestion. Take for acid reflux 60 Tab 11    amLODIPine (NORVASC) 5 mg tablet Take 1 Tab by mouth daily. 30 Tab 11    metoprolol succinate (TOPROL-XL) 50 mg XL tablet Take 1 Tab by mouth daily. 30 Tab 11    gabapentin (NEURONTIN) 100 mg capsule TAKE 1 CAP BY MOUTH NIGHTLY AS NEEDED. 20 Cap 1    hydrocortisone (ANUSOL-HC) 2.5 % rectal cream Insert  into rectum four (4) times daily. 45 g 5    aspirin delayed-release 81 mg tablet Take 1 Tab by mouth daily.  90 Tab 3     No Known Allergies    Objective:  Visit Vitals    /76 (BP 1 Location: Right arm, BP Patient Position: Sitting)    Pulse 82    Temp 98.2 °F (36.8 °C) (Oral)    Resp 18    Ht 5' (1.524 m)    Wt 131 lb (59.4 kg)    SpO2 97%    BMI 25.58 kg/m2     Wt Readings from Last 3 Encounters:   05/01/17 131 lb (59.4 kg)   03/10/17 129 lb (58.5 kg)   02/10/17 129 lb (58.5 kg)     Physical Exam:   General appearance - alert, well appearing, and in no distress. Mental status - A/O x 4, normal mood and affect. Neck -No JVD/thyromegaly. Chest - CTA. Symmetric chest rise. No wheezing, rales or rhonchi. Heart - Normal rate, regular rhythm. Normal S1, S2. No MGR or clicks. Abdomen - Soft,non-distended. Normoactive BS in all quadrants. NT, no mass or HSM. Ext- Radial, DP pulses, 2+ bilaterally. No pedal edema, clubbing, or cyanosis. Skin-Warm and dry. No hyperpigmentation, ulcerations, or suspicious lesions. Neuro - Normal speech, no focal findings or movement disorder. Normal strength and muscle tone. In W/C, but able to stand and walk slowly with assist, rocks to stand. Right Knee- LROM. global TTP. Varus deformity. No erythema or effusions. +crepitus. Assessment of cognitive impairment: Alert and oriented x 4    Depression Screen:   PHQ 2 / 9, over the last two weeks 5/1/2017   Little interest or pleasure in doing things Not at all   Feeling down, depressed or hopeless Not at all   Total Score PHQ 2 0       Fall Risk Assessment:    Fall Risk Assessment, last 12 mths 5/1/2017   Able to walk? Yes   Fall in past 12 months? No   Fall with injury? -   Number of falls in past 12 months -   Fall Risk Score -       Abuse Assessment: Patient NOT domesticly abuse (verbal, physical, and financial)    Current Alcohol use: NOT usually, used to drink light beers, none in past 2 years. reports that she drinks about 0.5 oz of alcohol per week     Functional Ability:   Does the patient exhibit a steady gait? Yes   How long did it take the patient to get up and walk from a sitting position?  5-6 seconds   Is the patient self reliant?  (ie can do own laundry, meals, household chores) yes, but lives with family     Does the patient handle his/her own medications? No, daughter manages meds     Does the patient handle his/her own money? Yes     Is the patients home safe (ie good lighting, handrails on stairs and bath, etc.)? Yes   Did you notice or did patient express any hearing difficulties? no   Did you notice of did patient express any vision difficulties?  no   Were distance and reading eye charts used? n/a     Advance Care Planning:   Patient was offered the opportunity to discuss advance care planning:  Yes   Does patient have an Advance Directive: No   If no, did you provide information and forms? yes     Health Maintenance   Topic Date Due    Pneumococcal 65+ Low/Medium Risk (1 of 2 - PCV13) 07/01/1995    INFLUENZA AGE 9 TO ADULT  08/01/2017    MEDICARE YEARLY EXAM  11/30/2017    GLAUCOMA SCREENING Q2Y  09/12/2018    DTaP/Tdap/Td series (2 - Td) 09/16/2026    OSTEOPOROSIS SCREENING (DEXA)  Completed    ZOSTER VACCINE AGE 60>  Addressed     Assessment/Plan:  The current medical regimen is effective;  continue present plan and medications. Medication Side Effects and Warnings were discussed with patient: yes   Patient Labs were reviewed: yes  Patient Past Records were reviewed: yes    See below for other orders   Follow-up Disposition: Not on File    Pt has given consent verbally while in office for [employer] Text messaging. ICD-10-CM ICD-9-CM    1. Essential hypertension I10 401.9    2. Slow transit constipation K59.01 564.01 lactulose (CHRONULAC) 10 gram/15 mL solution   3. Bilateral chronic knee pain M25.561 719.46     M25.562 338.29     G89.29       Orders Placed This Encounter    anastrozole (ARIMIDEX) 1 mg tablet     Sig: TAKE 1 TABLET BY MOUTH DAILY     Refill:  3    docusate sodium (COLACE) 100 mg capsule     Sig: TAKE ONE CAPSULE BY MOUTH TWICE A DAY     Refill:  2    lactulose (CHRONULAC) 10 gram/15 mL solution     Sig: Take 15 mL by mouth two (2) times daily as needed.  For severe CONSTIPATION Indications: Constipation     Dispense:  480 mL     Refill:  1    pneumococcal 13 rea conj dip (PREVNAR-13) 0.5 mL syrg injection     Si.5 mL by IntraMUSCular route once for 1 dose. Dispense:  0.5 mL     Refill:  0       Alfred Arrieta expressed understanding of plan. An After Visit Summary was offered/printed and given to the patient.

## 2017-05-02 RX ORDER — METOPROLOL SUCCINATE 50 MG/1
50 TABLET, EXTENDED RELEASE ORAL DAILY
Qty: 30 TAB | Refills: 11 | Status: SHIPPED | OUTPATIENT
Start: 2017-05-02 | End: 2017-08-15 | Stop reason: SDUPTHER

## 2017-05-02 NOTE — ACP (ADVANCE CARE PLANNING)
Advanced care planning- discussed Advance directive, Medical POA, and life sustaining options. Given/Mailing honoring [employer] paper work and contact info for [facility] to complete paperwork in office. Advance Care Planning (ACP) Provider Conversation Snapshot    Date of ACP Conversation: 05/01/17  Persons included in Conversation:  Patient/family  Length of ACP Conversation in minutes:  5-10 minutes    Authorized Decision Maker (if patient is incapable of making informed decisions): This person is:   Healthcare Agent/Medical Power of  under Advance Directive  Gertrudis Doll-primary          For Patients with Decision Making Capacity:   Values/Goals: Exploration of values, goals, and preferences if recovery is not expected, even with continued medical treatment in the event of:  Severe, permanent brain injury  \"In these circumstances, what matters most to you? \"  Care focused more on comfort or quality of life.     Conversation Outcomes / Follow-Up Plan:   Recommended completion of Advance Directive form after review of ACP materials and conversation with prospective healthcare agent   Referral made for ACP follow-up assistance to:  Nurse navigator

## 2017-05-03 DIAGNOSIS — I10 ESSENTIAL HYPERTENSION: ICD-10-CM

## 2017-05-03 RX ORDER — AMLODIPINE BESYLATE 5 MG/1
5 TABLET ORAL DAILY
Qty: 30 TAB | Refills: 11 | Status: SHIPPED | OUTPATIENT
Start: 2017-05-03 | End: 2019-12-18 | Stop reason: SDUPTHER

## 2017-07-17 ENCOUNTER — OFFICE VISIT (OUTPATIENT)
Dept: INTERNAL MEDICINE CLINIC | Age: 82
End: 2017-07-17

## 2017-07-17 VITALS
WEIGHT: 131 LBS | OXYGEN SATURATION: 97 % | TEMPERATURE: 98.7 F | HEIGHT: 60 IN | DIASTOLIC BLOOD PRESSURE: 65 MMHG | SYSTOLIC BLOOD PRESSURE: 136 MMHG | RESPIRATION RATE: 18 BRPM | HEART RATE: 67 BPM | BODY MASS INDEX: 25.72 KG/M2

## 2017-07-17 DIAGNOSIS — M25.561 BILATERAL CHRONIC KNEE PAIN: Primary | ICD-10-CM

## 2017-07-17 DIAGNOSIS — Z23 NEED FOR PNEUMOCOCCAL VACCINATION: ICD-10-CM

## 2017-07-17 DIAGNOSIS — Z02.89 PAIN MEDICATION AGREEMENT SIGNED: ICD-10-CM

## 2017-07-17 DIAGNOSIS — K59.01 SLOW TRANSIT CONSTIPATION: ICD-10-CM

## 2017-07-17 DIAGNOSIS — Z13.31 DEPRESSION SCREENING: ICD-10-CM

## 2017-07-17 DIAGNOSIS — G89.29 BILATERAL CHRONIC KNEE PAIN: Primary | ICD-10-CM

## 2017-07-17 DIAGNOSIS — M25.562 BILATERAL CHRONIC KNEE PAIN: Primary | ICD-10-CM

## 2017-07-17 DIAGNOSIS — E78.2 MIXED HYPERLIPIDEMIA: ICD-10-CM

## 2017-07-17 DIAGNOSIS — I10 HTN (HYPERTENSION), BENIGN: ICD-10-CM

## 2017-07-17 RX ORDER — TRAMADOL HYDROCHLORIDE 50 MG/1
50 TABLET ORAL
Qty: 30 TAB | Refills: 5 | Status: SHIPPED | OUTPATIENT
Start: 2017-07-17 | End: 2018-01-31 | Stop reason: SDUPTHER

## 2017-07-17 RX ORDER — LACTULOSE 10 G/15ML
10 SOLUTION ORAL; RECTAL
Qty: 480 ML | Refills: 1 | Status: SHIPPED | OUTPATIENT
Start: 2017-07-17 | End: 2018-12-04

## 2017-07-17 RX ORDER — DOCUSATE SODIUM 100 MG/1
100 CAPSULE, LIQUID FILLED ORAL 2 TIMES DAILY
Qty: 60 CAP | Refills: 11 | Status: SHIPPED | OUTPATIENT
Start: 2017-07-17 | End: 2019-07-18 | Stop reason: ALTCHOICE

## 2017-07-17 RX ORDER — LISINOPRIL 20 MG/1
20 TABLET ORAL DAILY
Qty: 90 TAB | Refills: 0 | Status: SHIPPED | OUTPATIENT
Start: 2017-07-17 | End: 2017-07-17 | Stop reason: SDUPTHER

## 2017-07-17 RX ORDER — LISINOPRIL 20 MG/1
20 TABLET ORAL DAILY
Qty: 90 TAB | Refills: 3 | Status: SHIPPED | OUTPATIENT
Start: 2017-07-17 | End: 2018-07-24 | Stop reason: SDUPTHER

## 2017-07-17 NOTE — PATIENT INSTRUCTIONS
Constipation: Care Instructions  Your Care Instructions  Constipation means that you have a hard time passing stools (bowel movements). People pass stools from 3 times a day to once every 3 days. What is normal for you may be different. Constipation may occur with pain in the rectum and cramping. The pain may get worse when you try to pass stools. Sometimes there are small amounts of bright red blood on toilet paper or the surface of stools. This is because of enlarged veins near the rectum (hemorrhoids). A few changes in your diet and lifestyle may help you avoid ongoing constipation. Your doctor may also prescribe medicine to help loosen your stool. Some medicines can cause constipation. These include pain medicines and antidepressants. Tell your doctor about all the medicines you take. Your doctor may want to make a medicine change to ease your symptoms. Follow-up care is a key part of your treatment and safety. Be sure to make and go to all appointments, and call your doctor if you are having problems. It's also a good idea to know your test results and keep a list of the medicines you take. How can you care for yourself at home? · Drink plenty of fluids, enough so that your urine is light yellow or clear like water. If you have kidney, heart, or liver disease and have to limit fluids, talk with your doctor before you increase the amount of fluids you drink. · Include high-fiber foods in your diet each day. These include fruits, vegetables, beans, and whole grains. · Get at least 30 minutes of exercise on most days of the week. Walking is a good choice. You also may want to do other activities, such as running, swimming, cycling, or playing tennis or team sports. · Take a fiber supplement, such as Citrucel or Metamucil, every day. Read and follow all instructions on the label. · Schedule time each day for a bowel movement. A daily routine may help.  Take your time having your bowel movement. · Support your feet with a small step stool when you sit on the toilet. This helps flex your hips and places your pelvis in a squatting position. · Your doctor may recommend an over-the-counter laxative to relieve your constipation. Examples are Milk of Magnesia and MiraLax. Read and follow all instructions on the label. Do not use laxatives on a long-term basis. When should you call for help? Call your doctor now or seek immediate medical care if:  · You have new or worse belly pain. · You have new or worse nausea or vomiting. · You have blood in your stools. Watch closely for changes in your health, and be sure to contact your doctor if:  · Your constipation is getting worse. · You do not get better as expected. Where can you learn more? Go to http://paola-shane.info/. Enter [phone] in the search box to learn more about \"Constipation: Care Instructions. \"  Current as of: March 20, 2017  Content Version: 11.3  © 9891-4240 [employer]. Care instructions adapted under license by [employer] (which disclaims liability or warranty for this information). If you have questions about a medical condition or this instruction, always ask your healthcare professional. [place] [address] any warranty or liability for your use of this information. Joint Injections: Care Instructions  Your Care Instructions  Joint injections are shots into a joint, such as the knee. They may be used to put in medicines, such as pain relievers. Or they can be used to take out fluid. Sometimes the fluid is tested in a lab. This can help find the cause of a joint problem. A corticosteroid, or steroid, shot is used to reduce inflammation in tendons or joints. It is often used to treat problems such as arthritis, tendinitis, and bursitis. Steroids can be injected directly into a painful, inflamed joint. They can also help reduce inflammation of a bursa.  A bursa is a sac of fluid. It cushions and lubricates areas where tendons, ligaments, skin, muscles, or bones rub against each other. A steroid shot can sometimes help with short-term pain relief when other treatments haven't worked. If steroid shots help, pain may improve for weeks or months. Follow-up care is a key part of your treatment and safety. Be sure to make and go to all appointments, and call your doctor if you are having problems. It's also a good idea to know your test results and keep a list of the medicines you take. How can you care for yourself at home? · Put ice or a cold pack on the area for 10 to 20 minutes at a time. Put a thin cloth between the ice and your skin. · Take anti-inflammatory medicines to reduce pain, swelling, or inflammation. These include ibuprofen (Advil, Motrin) and naproxen (Aleve). Read and follow all instructions on the label. · Avoid strenuous activities for several days, especially those that put stress on the area where you got the shot. · If you have dressings over the area, keep them clean and dry. You may remove them when your doctor tells you to. When should you call for help? Call your doctor now or seek immediate medical care if:  · You have signs of infection, such as:  ¨ Increased pain, swelling, warmth, or redness. ¨ Red streaks leading from the site. ¨ Pus draining from the site. ¨ A fever. Watch closely for changes in your health, and be sure to contact your doctor if you have any problems. Where can you learn more? Go to http://paola-shane.info/. Enter N616 in the search box to learn more about \"Joint Injections: Care Instructions. \"  Current as of: March 21, 2017  Content Version: 11.3  © 6919-1320 [employer]. Care instructions adapted under license by [employer] (which disclaims liability or warranty for this information).  If you have questions about a medical condition or this instruction, always ask your healthcare professional. [address] any warranty or liability for your use of this information.

## 2017-07-17 NOTE — PROGRESS NOTES
Pt is here for   Chief Complaint   Patient presents with    Medication Refill     orders pending     Pt denies pain at this time    1. Have you been to the ER, urgent care clinic since your last visit? Hospitalized since your last visit? No    2. Have you seen or consulted any other health care providers outside of the [address] [place] [address] since your last visit? Include any pap smears or colon screening.  No

## 2017-07-17 NOTE — PROGRESS NOTES
Chasidy Dias is a 80 y.o. female and presents with Medication Refill (orders pending)    Subjective:  Pt here with chronic right knee pain. Pain currently, 0 - No pain/10. Medication last taken 3 days, continues to take about 2 times weekly. Has not received meds from another provider. No change in pain presentation since last OV. Having trouble walking and standing on it, no change however. Dyslipidemia Review:  Patient presents for evaluation of lipids. Compliance with treatment thus far has been good. Denies myalgias, slurring speech, unilateral weakness, and chest pain. A repeat non-fasting lipid profile was done/ordered. The patient does use medications that may worsen dyslipidemias (corticosteroids, progestins, anabolic steroids, diuretics, beta-blockers, amiodarone, cyclosporine, olanzapine). The patient does not exercise regularly. The patient is known to have coexisting coronary artery disease; CVA. Taking Aspirin daily as prescribed/advised    Hypertension Review:  The patient has hypertension  Diet and Lifestyle: generally does try to follow a  low sodium diet, exercises rarely  Home BP Monitoring: is not measured at home. Pertinent ROS: taking medications as instructed, no medication side effects noted. No TIA's, chest pain on exertion, dyspnea on exertion, or swelling of ankles. BP Readings from Last 3 Encounters:   07/17/17 136/65   06/23/17 120/82   05/01/17 148/76     Constipation Review:  Here today to with chronic constipation. Onset years ago, unchanged. Associated with nothing. Denies bloating, abdominal pain, flatulence, nausea, rectal bleeding, laxative overuse, and decreased appetite. LBM: yesterday, normal. Takes docusate once daily and lactulose twice weekly. Currently does take opiates twice weekly.     Review of Systems  Constitutional: negative for fevers, chills, anorexia and weight loss  Eyes:   negative for visual disturbance, drainage, and irritation  ENT:   negative for tinnitus,sore throat,nasal congestion,ear pain,and hoarseness  Respiratory:  negative for cough, hemoptysis, dyspnea, and wheezing  CV:   negative for chest pain, palpitations, and lower extremity edema  GI:   negative for nausea, vomiting, diarrhea, abdominal pain, and melena  Endo:               negative for polyuria,polydipsia,polyphagia, and heat intolerance  Genitourinary: negative for frequency, urgency, dysuria, retention, and hematuria  Integument:  negative for rash, ulcerations  Hematologic:  negative for easy bruising and bleeding  Musculoskel: negative for muscle weakness  Neurological:  negative for headaches, dizziness, vertigo,and memory problems  Behavl/Psych: negative for feelings of anxiety, depression, suicide, and mood changes    Past Medical History:   Diagnosis Date    Cancer ([facility] [address]     right breast.    Chronic pain     CVA     HTN (hypertension) 2/9/2011    Hypertension     Microscopic hematuria 5/11/2011    Other and unspecified hyperlipidemia 2/9/2011    Subclinical hyperthyroidism 8/29/2015    Venous thrombosis 2/17/2010    left arm DVT     Past Surgical History:   Procedure Laterality Date    BREAST SURGERY PROCEDURE UNLISTED      HX HEENT      HX HEMORRHOIDECTOMY       Social History     Social History    Marital status:      Spouse name: N/A    Number of children: N/A    Years of education: N/A     Social History Main Topics    Smoking status: Former Smoker    Smokeless tobacco: Never Used      Comment: smoked for 10 yrs    Alcohol use 0.5 oz/week     1 Cans of beer per week      Comment: occ    Drug use: No    Sexual activity: No     Other Topics Concern    None     Social History Narrative     Family History   Problem Relation Age of Onset    Stroke Mother     Stroke Father     Diabetes Daughter      Current Outpatient Prescriptions   Medication Sig Dispense Refill    traMADol (ULTRAM) 50 mg tablet Take 1 Tab by mouth daily as needed for Pain.  30 Tab 5  lactulose (CHRONULAC) 10 gram/15 mL solution Take 15 mL by mouth two (2) times daily as needed. For severe CONSTIPATION  Indications: constipation 480 mL 1    docusate sodium (COLACE) 100 mg capsule Take 1 Cap by mouth two (2) times a day. 60 Cap 11    lisinopril (PRINIVIL, ZESTRIL) 20 mg tablet Take 1 Tab by mouth daily. 90 Tab 3    pneumococcal 13 rea conj dip (PREVNAR-13) 0.5 mL syrg injection 0.5 mL by IntraMUSCular route once for 1 dose. 0.5 mL 0    amLODIPine (NORVASC) 5 mg tablet Take 1 Tab by mouth daily. 30 Tab 11    metoprolol succinate (TOPROL-XL) 50 mg XL tablet Take 1 Tab by mouth daily. 30 Tab 11    anastrozole (ARIMIDEX) 1 mg tablet TAKE 1 TABLET BY MOUTH DAILY  3    methIMAzole (TAPAZOLE) 5 mg tablet Take 1 Tab by mouth daily. 30 Tab 11    simvastatin (ZOCOR) 20 mg tablet Take 1 Tab by mouth nightly. 30 Tab 11    ergocalciferol (ERGOCALCIFEROL) 50,000 unit capsule Take 1 Cap by mouth every seven (7) days. 12 Cap 4    polyethylene glycol (MIRALAX) 17 gram packet Take 1 Packet by mouth two (2) times daily as needed. 60 Packet 11    mometasone (ELOCON) 0.1 % topical cream Apply  to affected area daily. 15 g 1    ranitidine (ZANTAC) 150 mg tablet Take 1 Tab by mouth two (2) times daily as needed for Indigestion. Take for acid reflux 60 Tab 11    gabapentin (NEURONTIN) 100 mg capsule TAKE 1 CAP BY MOUTH NIGHTLY AS NEEDED. 20 Cap 1    hydrocortisone (ANUSOL-HC) 2.5 % rectal cream Insert  into rectum four (4) times daily. 45 g 5    aspirin delayed-release 81 mg tablet Take 1 Tab by mouth daily.  90 Tab 3     No Known Allergies    Objective:  Visit Vitals    /65 (BP 1 Location: Left arm, BP Patient Position: Sitting)    Pulse 67    Temp 98.7 °F (37.1 °C) (Oral)    Resp 18    Ht 5' (1.524 m)    Wt 131 lb (59.4 kg)    SpO2 97%    BMI 25.58 kg/m2     Wt Readings from Last 3 Encounters:   07/17/17 131 lb (59.4 kg)   05/01/17 131 lb (59.4 kg)   03/10/17 129 lb (58.5 kg) Physical Exam:    General appearance - alert, well appearing, and in no distress. Seated comfortably in W/C. Mental status - A/O x 4, normal mood and affect. Neck -Supple ,normal CSP. FROM, non-tender. No significant adenopathy/thyromegaly. No JVD. Chest - CTA. Symmetric chest rise. No wheezing, rales or rhonchi. Heart - Normal rate, regular rhythm. Normal S1, S2. No MGR or clicks. Abdomen - Soft,non-distended. Normoactive BS in all quadrants. NT, no mass or HSM. No CVAT. Ext- Radial, DP pulses, 2+ bilaterally. No pedal edema, clubbing, or cyanosis. Skin-Warm and dry. No clubbing or cyanosis. Hyperpigmentation to multiple nevi and skin tags. Neuro - Normal speech, no focal findings or movement disorder. Normal strength, gait, and muscle tone. Right Knee- LROM. no joint line tenderness. + quadriceps tendonitis. + patellar tendonitis. +medial femoral epicondyle tenderness. no tibial plateau tenderness. Varus deformity. No erythema or effusions. +crepitus. no laxity. no Lachman's test.       Assessment/Plan:  Chronic pain-Refilled tramadol.  was reviewed while planning for pain management, no indications of drug diversion suspected. Prescription history is not suspicious for controlled substance overuse. Docusate BID advised, offered knee injection, will schedule f/u visit if needed. See below for other orders   Follow-up Disposition:  Return in about 6 months (around 1/17/2018) for 6 month f/u. ICD-10-CM ICD-9-CM    1. Bilateral chronic knee pain M25.561 719.46 traMADol (ULTRAM) 50 mg tablet    M25.562 338.29 PAIN MGMT PANEL W/REFL, UR    G89.29     2. Slow transit constipation K59.01 564.01 lactulose (CHRONULAC) 10 gram/15 mL solution      docusate sodium (COLACE) 100 mg capsule   3. HTN (hypertension), benign I10 401.1 CBC WITH AUTOMATED DIFF      lisinopril (PRINIVIL, ZESTRIL) 20 mg tablet      DISCONTINUED: lisinopril (PRINIVIL, ZESTRIL) 20 mg tablet   4.  Mixed hyperlipidemia E78.2 272.2 LIPID PANEL   5. Need for pneumococcal vaccination Z23 V03.82 pneumococcal 13 rea conj dip (PREVNAR-13) 0.5 mL syrg injection   6. Pain medication agreement signed Z02.89 V58.69    7. Depression screening Z13.89 V79.0      Orders Placed This Encounter    PAIN MGMT PANEL W/REFL, UR    CBC WITH AUTOMATED DIFF    LIPID PANEL    traMADol (ULTRAM) 50 mg tablet     Sig: Take 1 Tab by mouth daily as needed for Pain. Dispense:  30 Tab     Refill:  5    DISCONTD: lisinopril (PRINIVIL, ZESTRIL) 20 mg tablet     Sig: Take 1 Tab by mouth daily. Dispense:  90 Tab     Refill:  0    lactulose (CHRONULAC) 10 gram/15 mL solution     Sig: Take 15 mL by mouth two (2) times daily as needed. For severe CONSTIPATION  Indications: constipation     Dispense:  480 mL     Refill:  1    docusate sodium (COLACE) 100 mg capsule     Sig: Take 1 Cap by mouth two (2) times a day. Dispense:  60 Cap     Refill:  11    lisinopril (PRINIVIL, ZESTRIL) 20 mg tablet     Sig: Take 1 Tab by mouth daily. Dispense:  90 Tab     Refill:  3     To replace NO refill order    pneumococcal 13 rea conj dip (PREVNAR-13) 0.5 mL syrg injection     Si.5 mL by IntraMUSCular route once for 1 dose. Dispense:  0.5 mL     Refill:  0       Alfred Clark Knee expressed understanding of plan. An After Visit Summary was offered/printed and given to the patient.

## 2017-07-17 NOTE — LETTER
Ricardo Crump RLI:[birth year] MR #:[id number] Provider Name:Flower Yuli Jefferson NP  
*SDNA-475* BSMG-491 (5/16) Page 1 of 5 Initial [employer] CONTROLLED SUBSTANCE AGREEMENT I may be prescribed medications that are controlled substances as part  of my treatment plan for management of my medical condition(s). The goal of my treatment plan is to maintain and/or improve my health and wellbeing. Because controlled substances have an increased risk of abuse or harm, continual re-evaluation is needed determine if the goals of my treatment plan are being met for my safety and the safety of others. Mouna Cole  am entering into this Controlled Substance Agreement with my provider, Royce Saldana NP at [facility] . I understand that successful treatment requires mutual trust and honesty between me and my provider. I understand that there are state and federal laws and regulations which apply to the medications that my provider may prescribe that must be followed. I understand there are risks and benefits ts of taking the medicines that my provider may prescribe. I understand and agree that following this Agreement is necessary in continuing my provider-patient relationship and success of my treatment plan. As a part of my treatment plan, I agree to the following: COMMUNICATION: 
 
1. I will communicate fully with my provider about my medical condition(s), including the effect on my daily life and how well my medications are helping. I will tell my provider all of the medications that I take for any reason, including medications I receive from another health care provider, and will notify my provider about all issues, problems or concerns, including any side effects, which may be related to my medications. I understand that this information allows my provider to adjust my treatment plan to help manage my medical condition.  I understand that this information will become part of my permanent medical record. 2. I will notify my provider if I have a history of alcohol/drug misuse/addiction or if I have had treatment for alcohol/drug addiction in the past, or if I have a new problem with or concern about alcohol/drug use/addiction, because this increases the likelihood of high risk behaviors and may lead to serious medical conditions. 3. Females Only: I will notify my provider if I am or become pregnant, or if I intend to become pregnant, or if I intend to breastfeed. I understand that communication of these issues with my provider is important, due to possible effects my medication could have on an unborn fetus or breastfeeding child. Jie Marcano [facility]:[birth year] MR #:[id number] Provider Name:Flower Vazquez NP  
*BRKS-141* BSMG-491 (5/16) Page 2 of 5 Initial [employer] MISUSE OF MEDICATIONS / DRUGS: 
 
1. I agree to take all controlled substances as prescribed, and will not misuse or abuse any controlled substances prescribed by my provider. For my safety, I will not increase the amount of medicine I take without first talking with and getting permission from my provider. 2. If I have a medical emergency, another health care provider may prescribe me medication. If I seek emergency treatment, I will notify my provider within seventy-two (72) hours. 3. I understand that my provider may discuss my use and/or possible misuse/abuse of controlled substances and alcohol, as appropriate, with any health care provider involved in my care, pharmacist or legal authority. ILLEGAL DRUGS: 
 
1. I will not use illegal drugs of any kind, including but not limited to marijuana, heroin, cocaine, or any prescription drug which is not prescribed to me. DRUG DIVERSION / PRESCRIPTION FRAUD: 
 
1. I will not share, sell, trade, give away, or otherwise misuse my prescriptions or medications. 2. I will not alter any prescriptions provided to me by my provider. SINGLE PROVIDER: 
 
1. I agree that all controlled substances that I take will be prescribed only by my provider (or his/her covering provider) under this Agreement. This agreement does not prevent me from seeking emergency medical treatment or receiving pain management related to a surgery. PROTECTING MEDICATIONS: 
 
1. I am responsible for keeping my prescriptions and medications in a safe and secure place including safeguarding them from loss or theft. I understand that lost, stolen or damaged/destroyed prescriptions or medications will not be replaced. Doron Contreras GDX:[birth year] MR #:[id number] Provider Name:Flowerkelly Clements, DOROTA  
*WGSU-301* BSMG-491 (5/16) Page 3 of 5 Initial [employer] PRESCRIPTION RENEWALS/REFILLS: 
 
1. I will follow my controlled substance medication schedule as prescribed by my provider. 2. I understand and agree that I will make any requests for renewals or refills of my prescriptions only at the time of an office visit or during my providers regular office hours subject to the prescription refill requirements of the individual practice. 3. I understand that my provider may not call in prescriptions for controlled substances to my pharmacy. 4. I understand that my provider may adjust or discontinue these medications as deemed appropriate for my medical treatment plan. This Agreement does not guarantee the prescription of controlled medications. 5. I agree that if my medications are adjusted or discontinued, I will properly dispose of any remaining medications. I understand that I will be required to dispose of any remaining controlled medications prior to being provided with any prescriptions for other controlled medications.  
 
 
1. I authorize my provider and my pharmacy to cooperate fully with any local, state, or federal law enforcement agency in the investigation of any possible misuse, sale, or other diversion of my controlled substance prescriptions or medications. RISKS: 
 
 
1. I understand that if I do not adhere to this Agreement in any way, my provider may change my prescriptions, stop prescribing controlled substances or end our provider-patient relationship. 2. If my provider decides to stop prescribing medication, or decides to end our provider-patient relationship,my provider may require that I taper my medications slowly. If necessary, my provider may also provide a prescription for other medications to treat my withdrawal symptoms. UNDERSTANDING THIS AGREEMENT: 
 
I understand that my provider may adjust or stop my prescriptions for controlled substances based on my medical condition and my treatment plan. I understand that this Agreement does not guarantee that I will be prescribed medications or controlled substances. I understand that controlled substances may be just one part 
of my treatment plan.  
 
My initial on each page and my signature below shows that I have read each page of this Agreement, I have had an opportunity to ask questions, and all of my questions have been answered to my satisfaction by my provider. By signing below, I agree to comply with this Agreement, and I understand that if I do not follow the Agreements listed above, my provider may stop 
 
 
 
_________________________________________  Date/Time 7/17/2017 2:47 PM   
             (Patient Signature)

## 2017-07-17 NOTE — MR AVS SNAPSHOT
Visit Information Date & Time Provider Department Dept. Phone Encounter #  
 7/17/2017  1:40 PM Bettina Berrios, DOR[place] [address] [facility] at [phone] [id number] Follow-up Instructions Return in about 6 months (around 1/17/2018) for 6 month f/u. Your Appointments 10/2/2017 11:00 AM  
ROUTINE CARE with Bettina Berrios, DOROTA  
[address] [facility]) Appt Note: lipids,labs and htn [address] [address] [address] [facility]  
  
    
 10/20/2017 10:30 AM  
ESTABLISHED PATIENT with MD Kelsey Marino TURNER, [address] ([facility]) Appt Note: 4 month f/u  
 [address] [address]  
[phone]  
  
   
 [address] [address]  
  
    
 5/2/2018 10:00 AM  
ROUTINE CARE with Bettina Berrios NP  
[address] [facility]) Appt Note: MEDICARE YEARLY EXAM  
 [address] [facility] [id number]  
[phone] Upcoming Health Maintenance Date Due Pneumococcal 65+ Low/Medium Risk (1 of 2 - PCV13) 7/1/1995 INFLUENZA AGE 9 TO ADULT 8/1/2017 MEDICARE YEARLY EXAM 5/2/2018 GLAUCOMA SCREENING Q2Y 9/12/2018 DTaP/Tdap/Td series (2 - Td) 9/16/2026 Allergies as of 7/17/2017  Review Complete On: 7/17/2017 By: Bennett Irby. DAPHNE Bar No Known Allergies Current Immunizations  Never Reviewed No immunizations on file. Not reviewed this visit You Were Diagnosed With   
  
 Codes Comments Bilateral chronic knee pain    -  Primary ICD-10-CM: M25.561, M25.562, G89.29 ICD-9-CM: 719.46, 338.29 Slow transit constipation     ICD-10-CM: K59.01 
ICD-9-CM: 564.01   
 HTN (hypertension), benign     ICD-10-CM: I10 
ICD-9-CM: 401.1 Mixed hyperlipidemia     ICD-10-CM: E78.2 ICD-9-CM: 272.2 Vitals BP Pulse Temp Resp Height(growth percentile) Weight(growth percentile) 136/65 (BP 1 Location: Left arm, BP Patient Position: Sitting) 67 98.7 °F (37.1 °C) (Oral) 18 5' (1.524 m) 131 lb (59.4 kg) SpO2 BMI OB Status Smoking Status 97% 25.58 kg/m2 Postmenopausal Former Smoker Vitals History BMI and BSA Data Body Mass Index Body Surface Area 25.58 kg/m 2 1.59 m 2 Preferred Pharmacy Pharmacy Name Phone [id number]- [place], [id number] [facility] 4 [phone] Your Updated Medication List  
  
   
This list is accurate as of: 7/17/17  2:59 PM.  Always use your most recent med list. amLODIPine 5 mg tablet Commonly known as:  Guys Coley Take 1 Tab by mouth daily. anastrozole 1 mg tablet Commonly known as:  ARIMIDEX TAKE 1 TABLET BY MOUTH DAILY  
  
 aspirin delayed-release 81 mg tablet Take 1 Tab by mouth daily. docusate sodium 100 mg capsule Commonly known as:  Uli Mars Take 1 Cap by mouth two (2) times a day. ergocalciferol 50,000 unit capsule Commonly known as:  ERGOCALCIFEROL Take 1 Cap by mouth every seven (7) days. gabapentin 100 mg capsule Commonly known as:  NEURONTIN  
TAKE 1 CAP BY MOUTH NIGHTLY AS NEEDED. hydrocortisone 2.5 % rectal cream  
Commonly known as:  ANUSOL-HC Insert  into rectum four (4) times daily. lactulose 10 gram/15 mL solution Commonly known as:  Garth Randi Take 15 mL by mouth two (2) times daily as needed. For severe CONSTIPATION  Indications: constipation  
  
 lisinopril 20 mg tablet Commonly known as:  Garcia Piasa Take 1 Tab by mouth daily. methIMAzole 5 mg tablet Commonly known as:  TAPAZOLE Take 1 Tab by mouth daily. metoprolol succinate 50 mg XL tablet Commonly known as:  TOPROL-XL Take 1 Tab by mouth daily. mometasone 0.1 % topical cream  
Commonly known as:  Betzaida Records Apply  to affected area daily. polyethylene glycol 17 gram packet Commonly known as:  Rashid Coma Take 1 Packet by mouth two (2) times daily as needed. raNITIdine 150 mg tablet Commonly known as:  ZANTAC Take 1 Tab by mouth two (2) times daily as needed for Indigestion. Take for acid reflux  
  
 simvastatin 20 mg tablet Commonly known as:  ZOCOR Take 1 Tab by mouth nightly. traMADol 50 mg tablet Commonly known as:  ULTRAM  
Take 1 Tab by mouth daily as needed for Pain. Prescriptions Printed Refills  
 traMADol (ULTRAM) 50 mg tablet 5 Sig: Take 1 Tab by mouth daily as needed for Pain. Class: Print Route: Oral  
  
Prescriptions Sent to Pharmacy Refills  
 lactulose (CHRONULAC) 10 gram/15 mL solution 1 Sig: Take 15 mL by mouth two (2) times daily as needed. For severe CONSTIPATION  Indications: constipation Class: Normal  
 Pharmacy: [address] [place] [address] Ph #: [phone] Route: Oral  
 docusate sodium (COLACE) 100 mg capsule 11 Sig: Take 1 Cap by mouth two (2) times a day. Class: Normal  
 Pharmacy: [address] [place] [address] Ph #: [phone] Route: Oral  
 lisinopril (PRINIVIL, ZESTRIL) 20 mg tablet 3 Sig: Take 1 Tab by mouth daily. Class: Normal  
 Pharmacy: [address] [place] [address] Ph #: [phone] Route: Oral  
  
We Performed the Following CBC WITH AUTOMATED DIFF [79616 CPT(R)] LIPID PANEL [17687 CPT(R)] PAIN MGMT PANEL W/REFL, UR [[id number] Custom] Follow-up Instructions Return in about 6 months (around 1/17/2018) for 6 month f/u. Patient Instructions Constipation: Care Instructions Your Care Instructions Constipation means that you have a hard time passing stools (bowel movements). People pass stools from 3 times a day to once every 3 days. What is normal for you may be different. Constipation may occur with pain in the rectum and cramping. The pain may get worse when you try to pass stools. Sometimes there are small amounts of bright red blood on toilet paper or the surface of stools. This is because of enlarged veins near the rectum (hemorrhoids). A few changes in your diet and lifestyle may help you avoid ongoing constipation. Your doctor may also prescribe medicine to help loosen your stool. Some medicines can cause constipation. These include pain medicines and antidepressants. Tell your doctor about all the medicines you take. Your doctor may want to make a medicine change to ease your symptoms. Follow-up care is a key part of your treatment and safety. Be sure to make and go to all appointments, and call your doctor if you are having problems. It's also a good idea to know your test results and keep a list of the medicines you take. How can you care for yourself at home? · Drink plenty of fluids, enough so that your urine is light yellow or clear like water. If you have kidney, heart, or liver disease and have to limit fluids, talk with your doctor before you increase the amount of fluids you drink. · Include high-fiber foods in your diet each day. These include fruits, vegetables, beans, and whole grains. · Get at least 30 minutes of exercise on most days of the week. Walking is a good choice. You also may want to do other activities, such as running, swimming, cycling, or playing tennis or team sports. · Take a fiber supplement, such as Citrucel or Metamucil, every day. Read and follow all instructions on the label. · Schedule time each day for a bowel movement. A daily routine may help. Take your time having your bowel movement. · Support your feet with a small step stool when you sit on the toilet. This helps flex your hips and places your pelvis in a squatting position. · Your doctor may recommend an over-the-counter laxative to relieve your constipation. Examples are Milk of Magnesia and MiraLax. Read and follow all instructions on the label. Do not use laxatives on a long-term basis. When should you call for help? Call your doctor now or seek immediate medical care if: 
· You have new or worse belly pain. · You have new or worse nausea or vomiting. · You have blood in your stools. Watch closely for changes in your health, and be sure to contact your doctor if: 
· Your constipation is getting worse. · You do not get better as expected. Where can you learn more? Go to http://paolaO2 Secure Wirelessshane.info/. Enter [phone] in the search box to learn more about \"Constipation: Care Instructions. \" Current as of: March 20, 2017 Content Version: 11.3 © 1690-8167 [employer]. Care instructions adapted under license by [employer] (which disclaims liability or warranty for this information). If you have questions about a medical condition or this instruction, always ask your healthcare professional. [address] any warranty or liability for your use of this information. Joint Injections: Care Instructions Your Care Instructions Joint injections are shots into a joint, such as the knee. They may be used to put in medicines, such as pain relievers. Or they can be used to take out fluid. Sometimes the fluid is tested in a lab. This can help find the cause of a joint problem. A corticosteroid, or steroid, shot is used to reduce inflammation in tendons or joints. It is often used to treat problems such as arthritis, tendinitis, and bursitis. Steroids can be injected directly into a painful, inflamed joint. They can also help reduce inflammation of a bursa. A bursa is a sac of fluid. It cushions and lubricates areas where tendons, ligaments, skin, muscles, or bones rub against each other. A steroid shot can sometimes help with short-term pain relief when other treatments haven't worked. If steroid shots help, pain may improve for weeks or months. Follow-up care is a key part of your treatment and safety. Be sure to make and go to all appointments, and call your doctor if you are having problems. It's also a good idea to know your test results and keep a list of the medicines you take. How can you care for yourself at home? · Put ice or a cold pack on the area for 10 to 20 minutes at a time. Put a thin cloth between the ice and your skin. · Take anti-inflammatory medicines to reduce pain, swelling, or inflammation. These include ibuprofen (Advil, Motrin) and naproxen (Aleve). Read and follow all instructions on the label. · Avoid strenuous activities for several days, especially those that put stress on the area where you got the shot. · If you have dressings over the area, keep them clean and dry. You may remove them when your doctor tells you to. When should you call for help? Call your doctor now or seek immediate medical care if: 
· You have signs of infection, such as: 
¨ Increased pain, swelling, warmth, or redness. ¨ Red streaks leading from the site. ¨ Pus draining from the site. ¨ A fever. Watch closely for changes in your health, and be sure to contact your doctor if you have any problems. Where can you learn more? Go to http://paola-shane.info/. Enter N616 in the search box to learn more about \"Joint Injections: Care Instructions. \" Current as of: March 21, 2017 Content Version: 11.3 © 0667-2471 [employer]. Care instructions adapted under license by [employer] (which disclaims liability or warranty for this information). If you have questions about a medical condition or this instruction, always ask your healthcare professional. [address] any warranty or liability for your use of this information. Introducing [facility] & HE[facility]! Karine Ramon introduces [employer] patient portal. Now you can access parts of your medical record, email your doctor's office, and request medication refills online. 1. In your internet browser, go to [email or web address] 2. Click on the First Time User? Click Here link in the Sign In box. You will see the New Member Sign Up page. 3. Enter your [employer] Access Code exactly as it appears below. You will not need to use this code after youve completed the sign-up process. If you do not sign up before the expiration date, you must request a new code. · [employer] Access Code: [email or web address] Expires: 7/30/2017 11:16 AM 
 
4. Enter the last four digits of your Social Security Number (xxxx) and Date of Birth (mm/dd/yyyy) as indicated and click Submit. You will be taken to the next sign-up page. 5. Create a [employer] ID. This will be your [employer] login ID and cannot be changed, so think of one that is secure and easy to remember. 6. Create a [employer] password. You can change your password at any time. 7. Enter your Password Reset Question and Answer. This can be used at a later time if you forget your password. 8. Enter your e-mail address. You will receive e-mail notification when new information is available in [address] 9. Click Sign Up. You can now view and download portions of your medical record. 10. Click the Download Summary menu link to download a portable copy of your medical information. If you have questions, please visit the Frequently Asked Questions section of the [employer] website. Remember, [employer] is NOT to be used for urgent needs. For medical emergencies, dial 911. Now available from your iPhone and Android! Please provide this summary of care documentation to your next provider. Your primary care clinician is listed as CATHRYN Grimes. If you have any questions after today's visit, please call [phone].

## 2017-07-18 LAB
AMPHETAMINES UR QL SCN: NEGATIVE NG/ML
BARBITURATES UR QL SCN: NEGATIVE NG/ML
BASOPHILS # BLD AUTO: NORMAL 10*3/UL
BENZODIAZ UR QL SCN: NEGATIVE NG/ML
BZE UR QL SCN: NEGATIVE NG/ML
CANNABINOIDS UR QL SCN: NEGATIVE NG/ML
CHOLEST SERPL-MCNC: NORMAL MG/DL
CREAT UR-MCNC: 196 MG/DL (ref 20–300)
EOSINOPHIL # BLD AUTO: NORMAL 10*3/UL
EOSINOPHIL NFR BLD AUTO: NORMAL %
FENTANYL+NORFENTANYL UR QL SCN: NEGATIVE PG/ML
HCT VFR BLD AUTO: NORMAL %
HDLC SERPL-MCNC: NORMAL MG/DL
HGB BLD-MCNC: NORMAL G/DL
INTERPRETATION, 910389: NORMAL
LYMPHOCYTES # BLD AUTO: NORMAL 10*3/UL
LYMPHOCYTES NFR BLD AUTO: NORMAL %
MEPERIDINE UR QL: NEGATIVE NG/ML
METHADONE UR QL SCN: NEGATIVE NG/ML
MONOCYTES NFR BLD AUTO: NORMAL %
NEUTROPHILS NFR BLD AUTO: NORMAL %
OPIATES UR QL SCN: NEGATIVE NG/ML
OXYCODONE+OXYMORPHONE UR QL SCN: NEGATIVE NG/ML
PCP UR QL: NEGATIVE NG/ML
PDF IMAGE, 910387: NORMAL
PH UR: 6.4 [PH] (ref 4.5–8.9)
PLATELET # BLD AUTO: NORMAL 10*3/UL
PLEASE NOTE:, 733157: NORMAL
PROPOXYPH UR QL SCN: NEGATIVE NG/ML
RBC # BLD AUTO: NORMAL 10*6/UL
SP GR UR: 1.02
TRAMADOL UR QL SCN: NEGATIVE NG/ML
TRIGL SERPL-MCNC: NORMAL MG/DL (ref ?–150)
WBC # BLD AUTO: NORMAL X10E3/UL

## 2017-08-01 ENCOUNTER — HOSPITAL ENCOUNTER (OUTPATIENT)
Dept: LAB | Age: 82
Discharge: HOME | End: 2017-08-01

## 2017-08-01 PROCEDURE — 99001 SPECIMEN HANDLING PT-LAB: CPT | Performed by: NURSE PRACTITIONER

## 2017-08-10 ENCOUNTER — TELEPHONE (OUTPATIENT)
Dept: INTERNAL MEDICINE CLINIC | Age: 82
End: 2017-08-10

## 2017-08-15 RX ORDER — METOPROLOL SUCCINATE 50 MG/1
50 TABLET, EXTENDED RELEASE ORAL DAILY
Qty: 90 TAB | Refills: 0 | Status: SHIPPED | OUTPATIENT
Start: 2017-08-15 | End: 2018-04-16 | Stop reason: SDUPTHER

## 2017-09-07 LAB
AMPHETAMINES UR QL SCN: NEGATIVE NG/ML
BARBITURATES UR QL SCN: NEGATIVE NG/ML
BASOPHILS # BLD AUTO: 0 X10E3/UL (ref 0–0.2)
BASOPHILS NFR BLD AUTO: 1 %
BENZODIAZ UR QL SCN: NEGATIVE NG/ML
BZE UR QL SCN: NEGATIVE NG/ML
CANNABINOIDS UR QL SCN: NEGATIVE NG/ML
CHOLEST SERPL-MCNC: 151 MG/DL (ref 100–199)
CREAT UR-MCNC: 191.9 MG/DL (ref 20–300)
EOSINOPHIL # BLD AUTO: 0.1 X10E3/UL (ref 0–0.4)
EOSINOPHIL NFR BLD AUTO: 1 %
ERYTHROCYTE [DISTWIDTH] IN BLOOD BY AUTOMATED COUNT: 14.5 % (ref 12.3–15.4)
FENTANYL+NORFENTANYL UR QL SCN: NEGATIVE PG/ML
HCT VFR BLD AUTO: 35 % (ref 34–46.6)
HDLC SERPL-MCNC: 60 MG/DL
HGB BLD-MCNC: 11.5 G/DL (ref 11.1–15.9)
IMM GRANULOCYTES # BLD: 0 X10E3/UL (ref 0–0.1)
IMM GRANULOCYTES NFR BLD: 1 %
INTERPRETATION, 910389: NORMAL
LDLC SERPL CALC-MCNC: 80 MG/DL (ref 0–99)
LYMPHOCYTES # BLD AUTO: 0.8 X10E3/UL (ref 0.7–3.1)
LYMPHOCYTES NFR BLD AUTO: 16 %
MCH RBC QN AUTO: 33.9 PG (ref 26.6–33)
MCHC RBC AUTO-ENTMCNC: 32.9 G/DL (ref 31.5–35.7)
MCV RBC AUTO: 103 FL (ref 79–97)
MEPERIDINE UR QL: NEGATIVE NG/ML
METHADONE UR QL SCN: NEGATIVE NG/ML
MONOCYTES # BLD AUTO: 0.5 X10E3/UL (ref 0.1–0.9)
MONOCYTES NFR BLD AUTO: 9 %
NEUTROPHILS # BLD AUTO: 3.9 X10E3/UL (ref 1.4–7)
NEUTROPHILS NFR BLD AUTO: 72 %
OPIATES UR QL SCN: NEGATIVE NG/ML
OXYCODONE+OXYMORPHONE UR QL SCN: NEGATIVE NG/ML
PCP UR QL: NEGATIVE NG/ML
PH UR: 6.5 [PH] (ref 4.5–8.9)
PLATELET # BLD AUTO: 201 X10E3/UL (ref 150–379)
PLEASE NOTE:, 733157: ABNORMAL
PROPOXYPH UR QL SCN: NEGATIVE NG/ML
RBC # BLD AUTO: 3.39 X10E6/UL (ref 3.77–5.28)
SP GR UR: 1.01
TRAMADOL UR-MCNC: POSITIVE UG/ML
TRIGL SERPL-MCNC: 54 MG/DL (ref 0–149)
VLDLC SERPL CALC-MCNC: 11 MG/DL (ref 5–40)
WBC # BLD AUTO: 5.3 X10E3/UL (ref 3.4–10.8)

## 2017-09-20 ENCOUNTER — TELEPHONE (OUTPATIENT)
Dept: INTERNAL MEDICINE CLINIC | Age: 82
End: 2017-09-20

## 2017-09-20 NOTE — TELEPHONE ENCOUNTER
PT'S daughter Glen Cid called stating pt's cancer doctor changed her amlodipine from 5 mg to 10 mg. She wants to know which one should the paitne take.  Gertrudis's # [phone]

## 2017-09-21 NOTE — TELEPHONE ENCOUNTER
What was her BP readings the past few visit with her oncologist? They have been normal here. If they are NOT elevated there, then she should resume the 5 mg as prescribed here.

## 2017-09-28 NOTE — TELEPHONE ENCOUNTER
Called patient, sounds as if someone picked up the phone but didn't say anything, retried the number got voice mail, unable to leave message mailbox full

## 2018-01-31 ENCOUNTER — OFFICE VISIT (OUTPATIENT)
Dept: INTERNAL MEDICINE CLINIC | Age: 83
End: 2018-01-31

## 2018-01-31 VITALS
DIASTOLIC BLOOD PRESSURE: 64 MMHG | TEMPERATURE: 97.2 F | HEIGHT: 60 IN | SYSTOLIC BLOOD PRESSURE: 142 MMHG | RESPIRATION RATE: 18 BRPM | HEART RATE: 70 BPM | BODY MASS INDEX: 25.52 KG/M2 | WEIGHT: 130 LBS | OXYGEN SATURATION: 94 %

## 2018-01-31 DIAGNOSIS — Z98.890 S/P LUMPECTOMY, RIGHT BREAST: ICD-10-CM

## 2018-01-31 DIAGNOSIS — M25.562 BILATERAL CHRONIC KNEE PAIN: ICD-10-CM

## 2018-01-31 DIAGNOSIS — G89.29 BILATERAL CHRONIC KNEE PAIN: ICD-10-CM

## 2018-01-31 DIAGNOSIS — M25.561 BILATERAL CHRONIC KNEE PAIN: ICD-10-CM

## 2018-01-31 DIAGNOSIS — Z71.89 ADVANCE CARE PLANNING: ICD-10-CM

## 2018-01-31 DIAGNOSIS — Z00.00 MEDICARE ANNUAL WELLNESS VISIT, SUBSEQUENT: Primary | ICD-10-CM

## 2018-01-31 DIAGNOSIS — I10 HTN (HYPERTENSION), BENIGN: ICD-10-CM

## 2018-01-31 DIAGNOSIS — Z85.3 HISTORY OF BREAST CANCER: ICD-10-CM

## 2018-01-31 RX ORDER — TRAMADOL HYDROCHLORIDE 50 MG/1
50 TABLET ORAL
Qty: 30 TAB | Refills: 2 | Status: SHIPPED | OUTPATIENT
Start: 2018-01-31 | End: 2018-07-31 | Stop reason: SDUPTHER

## 2018-01-31 RX ORDER — DICLOFENAC SODIUM 10 MG/G
2 GEL TOPICAL EVERY 6 HOURS
Qty: 100 G | Refills: 2 | Status: SHIPPED | OUTPATIENT
Start: 2018-01-31 | End: 2019-06-17 | Stop reason: SDUPTHER

## 2018-01-31 NOTE — PATIENT INSTRUCTIONS
Advance Directives: Care Instructions  Your Care Instructions  An advance directive is a legal way to state your wishes at the end of your life. It tells your family and your doctor what to do if you can no longer say what you want. There are two main types of advance directives. You can change them any time that your wishes change. · A living will tells your family and your doctor your wishes about life support and other treatment. · A durable power of  for health care lets you name a person to make treatment decisions for you when you can't speak for yourself. This person is called a health care agent. If you do not have an advance directive, decisions about your medical care may be made by a doctor or a  who doesn't know you. It may help to think of an advance directive as a gift to the people who care for you. If you have one, they won't have to make tough decisions by themselves. Follow-up care is a key part of your treatment and safety. Be sure to make and go to all appointments, and call your doctor if you are having problems. It's also a good idea to know your test results and keep a list of the medicines you take. How can you care for yourself at home? · Discuss your wishes with your loved ones and your doctor. This way, there are no surprises. · Many states have a unique form. Or you might use a universal form that has been approved by many states. This kind of form can sometimes be completed and stored online. Your electronic copy will then be available wherever you have a connection to the Internet. In most cases, doctors will respect your wishes even if you have a form from a different state. · You don't need a  to do an advance directive. But you may want to get legal advice. · Think about these questions when you prepare an advance directive:  ¨ Who do you want to make decisions about your medical care if you are not able to?  Many people choose a family member or close friend. ¨ Do you know enough about life support methods that might be used? If not, talk to your doctor so you understand. ¨ What are you most afraid of that might happen? You might be afraid of having pain, losing your independence, or being kept alive by machines. ¨ Where would you prefer to die? Choices include your home, a hospital, or a nursing home. ¨ Would you like to have information about hospice care to support you and your family? ¨ Do you want to donate organs when you die? ¨ Do you want certain Muslim practices performed before you die? If so, put your wishes in the advance directive. · Read your advance directive every year, and make changes as needed. When should you call for help? Be sure to contact your doctor if you have any questions. Where can you learn more? Go to http://paolaTigglyshane.info/. Enter R264 in the search box to learn more about \"Advance Directives: Care Instructions. \"  Current as of: September 24, 2016  Content Version: 11.4  © 1586-4753 [employer]. Care instructions adapted under license by [employer] (which disclaims liability or warranty for this information). If you have questions about a medical condition or this instruction, always ask your healthcare professional. [place] [address] any warranty or liability for your use of this information. Learning About Durable Power of  for Health Care  What is a durable power of  for health care? A durable power of  for health care is one type of the legal forms called advance directives. It lets you decide who you want to make treatment decisions for you if you cannot speak or decide for yourself. The person you choose is called your health care agent. Another type of advance directive is a living will. It lets you write down what kinds of treatment or life support you want or do not want.   What should you think about when choosing a health care agent? Choose your health care agent carefully. This person may or may not be a family member. Talk to the person before you make your final decision. Make sure he or she is comfortable with this responsibility. It's a good idea to choose someone who:  · Is at least 25years old. · Knows you well and understands what makes life meaningful for you. · Understands your Yazidism and moral values. · Will do what you want, not what he or she wants. · Will be able to make difficult choices at a stressful time. · Will be able to refuse or stop treatment, if that is what you would want, even if you could die. · Will be firm and confident with health professionals if needed. · Will ask questions to get necessary information. · Lives near you or agrees to travel to you if needed. Your family may help you make medical decisions while you can still be part of that process. But it is important to choose one person to be your health care agent in case you are not able to make decisions for yourself. If you don't fill out the legal form and name a health care agent, the decisions your family can make may be limited. Who will make decisions for you if you do not have a health care agent? If you don't have a health care agent or a living will, your family members may disagree about your medical care. And then some medical professionals who may not know you as well might have to make decisions for you. In some cases, a  makes the decisions. When you name a health care agent, it is very clear who has the power to make health decisions for you. How do you name a health care agent? You name your health care agent on a legal form. It is usually called a durable power of  for health care. Ask your hospital, state bar association, or office on aging where to find these forms. You must sign the form to make it legal. Some states require you to get the form notarized.  This means that a person called a  watches you sign the form and then he or she signs the form. Some states also require that two or more witnesses sign the form. Be sure to tell your family members and doctors who your health care agent is. Keep your forms in a safe place. But make sure that your loved ones know where the forms are. This could be in your desk where you keep other important papers. Make sure your doctor has a copy of your forms. Where can you learn more? Go to http://paola-shane.info/. Enter [id number] in the search box to learn more about \"Learning About Durable Power of  for [facility]. \"  Current as of: September 24, 2016  Content Version: 11.4  © 0092-7625 "[employer]". Care instructions adapted under license by [employer] (which disclaims liability or warranty for this information). If you have questions about a medical condition or this instruction, always ask your healthcare professional. [place] [address] any warranty or liability for your use of this information. Deciding About Life-Prolonging Treatment  Deciding About Life-Prolonging Treatment  What is life-prolonging treatment? There are many kinds of treatment that can help you live longer. These may be needed for only a short time until your illness improves. Or you may use them over the long term to help keep you alive. Some treatments include the use of:  · Medicines to slow the progress of certain diseases, such as heart disease, diabetes, cancer, AIDS, or Alzheimer's disease. · Antibiotics to treat serious infections, such as pneumonia. · Dialysis to clean your blood if your kidneys stop working. · A breathing machine to help you breathe if you can't breathe on your own. This machine pumps air into your lungs through a tube put into your throat.   · A feeding tube or an intravenous (IV) line to give you food and fluids if you can't eat or drink.  · Cardiopulmonary resuscitation (CPR) to try to restart your heart. The decision to receive treatments that may help you live longer is a personal one. You may want your doctor to do everything possible to keep you alive, even when your chance for recovery is small. Or you may choose to only have care to manage your pain and other symptoms. What are key points about this decision? · If there is a good chance that your illness can be cured or managed, your doctor may advise you to first try available treatments. If these don't work, then you might think about stopping treatment. · If you stop treatment, you will still receive care that focuses on pain relief and comfort. · A decision to stop treatment that keeps you alive does not have to be permanent. You can always change your mind if your health starts to improve. · Even though treatment focuses on helping you live longer, it may cause side effects that can greatly affect your quality of life. And it could affect how you spend time with your family and friends. · If you still have personal goals that you want to pursue, you may want treatment that keeps you alive long enough to reach them. Why might you choose life-prolonging treatment? · There is a good chance that your illness can be cured or managed. · You think you can manage the possible side effects of treatment. · You don't think treatment will get in the way of your quality of life. · You have personal goals that you still want to pursue and achieve. Why might you choose to stop life-prolonging treatment? · Your chance of surviving your illness is very low. · You have tried all possible treatments for your illness, but they have not helped. · You can no longer deal with the side effects of treatment. · You have already met the goals you set out to achieve in your life. Your decision  Thinking about the facts and your feelings can help you make a decision that is right for you.  Be sure you understand the benefits and risks of your options, and think about what else you need to do before you make the decision. Where can you learn more? Go to http://paola-shane.info/. Enter T741 in the search box to learn more about \"Deciding About Life-Prolonging Treatment. \"  Current as of: September 24, 2016  Content Version: 11.4  © 2940-2230 [employer]. Care instructions adapted under license by [employer] (which disclaims liability or warranty for this information). If you have questions about a medical condition or this instruction, always ask your healthcare professional. [place] [address] any warranty or liability for your use of this information. Body Mass Index: Care Instructions  Your Care Instructions    Body mass index (BMI) can help you see if your weight is raising your risk for health problems. It uses a formula to compare how much you weigh with how tall you are. · A BMI lower than 18.5 is considered underweight. · A BMI between 18.5 and 24.9 is considered healthy. · A BMI between 25 and 29.9 is considered overweight. A BMI of 30 or higher is considered obese. If your BMI is in the normal range, it means that you have a lower risk for weight-related health problems. If your BMI is in the overweight or obese range, you may be at increased risk for weight-related health problems, such as high blood pressure, heart disease, stroke, arthritis or joint pain, and diabetes. If your BMI is in the underweight range, you may be at increased risk for health problems such as fatigue, lower protection (immunity) against illness, muscle loss, bone loss, hair loss, and hormone problems. BMI is just one measure of your risk for weight-related health problems. You may be at higher risk for health problems if you are not active, you eat an unhealthy diet, or you drink too much alcohol or use tobacco products.   Follow-up care is a key part of your treatment and safety. Be sure to make and go to all appointments, and call your doctor if you are having problems. It's also a good idea to know your test results and keep a list of the medicines you take. How can you care for yourself at home? · Practice healthy eating habits. This includes eating plenty of fruits, vegetables, whole grains, lean protein, and low-fat dairy. · If your doctor recommends it, get more exercise. Walking is a good choice. Bit by bit, increase the amount you walk every day. Try for at least 30 minutes on most days of the week. · Do not smoke. Smoking can increase your risk for health problems. If you need help quitting, talk to your doctor about stop-smoking programs and medicines. These can increase your chances of quitting for good. · Limit alcohol to 2 drinks a day for men and 1 drink a day for women. Too much alcohol can cause health problems. If you have a BMI higher than 25  · Your doctor may do other tests to check your risk for weight-related health problems. This may include measuring the distance around your waist. A waist measurement of more than 40 inches in men or 35 inches in women can increase the risk of weight-related health problems. · Talk with your doctor about steps you can take to stay healthy or improve your health. You may need to make lifestyle changes to lose weight and stay healthy, such as changing your diet and getting regular exercise. If you have a BMI lower than 18.5  · Your doctor may do other tests to check your risk for health problems. · Talk with your doctor about steps you can take to stay healthy or improve your health. You may need to make lifestyle changes to gain or maintain weight and stay healthy, such as getting more healthy foods in your diet and doing exercises to build muscle. Where can you learn more? Go to http://paola-shane.info/.   Enter S176 in the search box to learn more about \"Body Mass Index: Care Instructions. \"  Current as of: October 13, 2016  Content Version: 11.4  © 8432-8499 [employer], [email or web address]. Care instructions adapted under license by [employer] (which disclaims liability or warranty for this information). If you have questions about a medical condition or this instruction, always ask your healthcare professional. [place] [address] any warranty or liability for your use of this information.

## 2018-01-31 NOTE — MR AVS SNAPSHOT
[address] StreetAval[address] Technologies Holdings [address] [address] 
[phone] Patient: Clara Bond MRN: [id number] RWP:3/1/0323 Visit Information Date & Time Provider Department Dept. Phone Encounter #  
 1/31/2018 10:40 AM Maikol Kauffman NP 0[address] [facility] [phone] [id number] Follow-up Instructions Return in about 6 months (around 7/31/2018) for HTN, pain med refill, labs. Your Appointments 2/16/2018 10:30 AM  
ESTABLISHED PATIENT with MD Nathaniel Meza TURNER, [address] [place] [address] ([address]) Appt Note: 4 month f/u  
 [address] [facility] [address]  
[phone]  
  
   
 [address] [facility] 7 [address] Upcoming Health Maintenance Date Due Pneumococcal 65+ Low/Medium Risk (1 of 2 - PCV13) 5/1/2018* GLAUCOMA SCREENING Q2Y 9/12/2018 MEDICARE YEARLY EXAM 2/1/2019 DTaP/Tdap/Td series (2 - Td) 9/16/2026 *Topic was postponed. The date shown is not the original due date. Allergies as of 1/31/2018  Review Complete On: 1/31/2018 By: Dajuan Reid. DAPHNE Bar No Known Allergies Current Immunizations  Never Reviewed Name Date Influenza High Dose Vaccine PF 10/20/2017 Not reviewed this visit You Were Diagnosed With   
  
 Codes Comments Medicare annual wellness visit, subsequent    -  Primary ICD-10-CM: Z00.00 ICD-9-CM: V70.0 Advance care planning     ICD-10-CM: Z71.89 ICD-9-CM: V65.49 Bilateral chronic knee pain     ICD-10-CM: M25.561, M25.562, G89.29 ICD-9-CM: 719.46, 338.29 History of breast cancer     ICD-10-CM: Z85.3 ICD-9-CM: V10.3 S/P lumpectomy, right breast     ICD-10-CM: Z11.737 ICD-9-CM: V45.89 HTN (hypertension), benign     ICD-10-CM: I10 
ICD-9-CM: 401.1 Vitals BP Pulse Temp Resp Height(growth percentile) Weight(growth percentile)  142/64 (BP 1 Location: Left arm, BP Patient Position: Sitting) 70 97.2 °F (36.2 °C) (Oral) 18 5' (1.524 m) 130 lb (59 kg) SpO2 BMI OB Status Smoking Status 94% 25.39 kg/m2 Postmenopausal Former Smoker Vitals History BMI and BSA Data Body Mass Index Body Surface Area  
 25.39 kg/m 2 1.58 m 2 Preferred Pharmacy Pharmacy Name Phone [id number] [phone] [id number] [facility] [phone] Your Updated Medication List  
  
   
This list is accurate as of: 1/31/18 12:15 PM.  Always use your most recent med list. amLODIPine 5 mg tablet Commonly known as:  Rulon Nadine Take 1 Tab by mouth daily. anastrozole 1 mg tablet Commonly known as:  ARIMIDEX TAKE 1 TABLET BY MOUTH DAILY  
  
 aspirin delayed-release 81 mg tablet Take 1 Tab by mouth daily. diclofenac 1 % Gel Commonly known as:  VOLTAREN Apply 2 g to affected area every six (6) hours. docusate sodium 100 mg capsule Commonly known as:  Margette Huger Take 1 Cap by mouth two (2) times a day. ergocalciferol 50,000 unit capsule Commonly known as:  ERGOCALCIFEROL Take 1 Cap by mouth every seven (7) days. gabapentin 100 mg capsule Commonly known as:  NEURONTIN  
TAKE 1 CAP BY MOUTH NIGHTLY AS NEEDED. hydrocortisone 2.5 % rectal cream  
Commonly known as:  ANUSOL-HC Insert  into rectum four (4) times daily. lactulose 10 gram/15 mL solution Commonly known as:  Cuevas Draft Take 15 mL by mouth two (2) times daily as needed. For severe CONSTIPATION  Indications: constipation  
  
 lisinopril 20 mg tablet Commonly known as:  Kenn Cabot Take 1 Tab by mouth daily. methIMAzole 5 mg tablet Commonly known as:  TAPAZOLE Take 1 Tab by mouth every Monday, Wednesday, Friday. metoprolol succinate 50 mg XL tablet Commonly known as:  TOPROL-XL Take 1 Tab by mouth daily. mometasone 0.1 % topical cream  
Commonly known as:  Mendoza Klippmelanie Apply  to affected area daily. polyethylene glycol 17 gram packet Commonly known as:  Chau Goodpasture Take 1 Packet by mouth two (2) times daily as needed. raNITIdine 150 mg tablet Commonly known as:  ZANTAC Take 1 Tab by mouth two (2) times daily as needed for Indigestion. Take for acid reflux  
  
 simvastatin 20 mg tablet Commonly known as:  ZOCOR Take 1 Tab by mouth nightly. traMADol 50 mg tablet Commonly known as:  ULTRAM  
Take 1 Tab by mouth daily as needed for Pain. Indications: Pain Prescriptions Printed Refills  
 traMADol (ULTRAM) 50 mg tablet 2 Sig: Take 1 Tab by mouth daily as needed for Pain. Indications: Pain Class: Print Route: Oral  
  
Prescriptions Sent to Pharmacy Refills  
 diclofenac (VOLTAREN) 1 % gel 2 Sig: Apply 2 g to affected area every six (6) hours. Class: Normal  
 Pharmacy: [address] [place] [address] 1  #: [phone] Route: Topical  
  
Follow-up Instructions Return in about 6 months (around 7/31/2018) for HTN, pain med refill, labs. Patient Instructions Advance Directives: Care Instructions Your Care Instructions An advance directive is a legal way to state your wishes at the end of your life. It tells your family and your doctor what to do if you can no longer say what you want. There are two main types of advance directives. You can change them any time that your wishes change. · A living will tells your family and your doctor your wishes about life support and other treatment. · A durable power of  for health care lets you name a person to make treatment decisions for you when you can't speak for yourself. This person is called a health care agent. If you do not have an advance directive, decisions about your medical care may be made by a doctor or a  who doesn't know you.  
It may help to think of an advance directive as a gift to the people who care for you. If you have one, they won't have to make tough decisions by themselves. Follow-up care is a key part of your treatment and safety. Be sure to make and go to all appointments, and call your doctor if you are having problems. It's also a good idea to know your test results and keep a list of the medicines you take. How can you care for yourself at home? · Discuss your wishes with your loved ones and your doctor. This way, there are no surprises. · Many states have a unique form. Or you might use a universal form that has been approved by many states. This kind of form can sometimes be completed and stored online. Your electronic copy will then be available wherever you have a connection to the Internet. In most cases, doctors will respect your wishes even if you have a form from a different state. · You don't need a  to do an advance directive. But you may want to get legal advice. · Think about these questions when you prepare an advance directive: ¨ Who do you want to make decisions about your medical care if you are not able to? Many people choose a family member or close friend. ¨ Do you know enough about life support methods that might be used? If not, talk to your doctor so you understand. ¨ What are you most afraid of that might happen? You might be afraid of having pain, losing your independence, or being kept alive by machines. ¨ Where would you prefer to die? Choices include your home, a hospital, or a nursing home. ¨ Would you like to have information about hospice care to support you and your family? ¨ Do you want to donate organs when you die? ¨ Do you want certain Jainism practices performed before you die? If so, put your wishes in the advance directive. · Read your advance directive every year, and make changes as needed. When should you call for help? Be sure to contact your doctor if you have any questions. Where can you learn more? Go to http://paola-shane.info/. Enter R264 in the search box to learn more about \"Advance Directives: Care Instructions. \" Current as of: September 24, 2016 Content Version: 11.4 © 0147-8908 [employer]. Care instructions adapted under license by [employer] (which disclaims liability or warranty for this information). If you have questions about a medical condition or this instruction, always ask your healthcare professional. [address] any warranty or liability for your use of this information. Learning About Durable Power of  for Health Care What is a durable power of  for health care? A durable power of  for health care is one type of the legal forms called advance directives. It lets you decide who you want to make treatment decisions for you if you cannot speak or decide for yourself. The person you choose is called your health care agent. Another type of advance directive is a living will. It lets you write down what kinds of treatment or life support you want or do not want. What should you think about when choosing a health care agent? Choose your health care agent carefully. This person may or may not be a family member. Talk to the person before you make your final decision. Make sure he or she is comfortable with this responsibility. It's a good idea to choose someone who: · Is at least 25years old. · Knows you well and understands what makes life meaningful for you. · Understands your Jewish and moral values. · Will do what you want, not what he or she wants. · Will be able to make difficult choices at a stressful time. · Will be able to refuse or stop treatment, if that is what you would want, even if you could die. · Will be firm and confident with health professionals if needed. · Will ask questions to get necessary information. · Lives near you or agrees to travel to you if needed. Your family may help you make medical decisions while you can still be part of that process. But it is important to choose one person to be your health care agent in case you are not able to make decisions for yourself. If you don't fill out the legal form and name a health care agent, the decisions your family can make may be limited. Who will make decisions for you if you do not have a health care agent? If you don't have a health care agent or a living will, your family members may disagree about your medical care. And then some medical professionals who may not know you as well might have to make decisions for you. In some cases, a  makes the decisions. When you name a health care agent, it is very clear who has the power to make health decisions for you. How do you name a health care agent? You name your health care agent on a legal form. It is usually called a durable power of  for health care. Ask your hospital, state bar association, or office on aging where to find these forms. You must sign the form to make it legal. Some states require you to get the form notarized. This means that a person called a  watches you sign the form and then he or she signs the form. Some states also require that two or more witnesses sign the form. Be sure to tell your family members and doctors who your health care agent is. Keep your forms in a safe place. But make sure that your loved ones know where the forms are. This could be in your desk where you keep other important papers. Make sure your doctor has a copy of your forms. Where can you learn more? Go to http://paola-shane.info/. Enter [id number] in the search box to learn more about \"Learning About Durable Power of  for [facility]. \" Current as of: September 24, 2016 Content Version: 11.4 © 7059-2614 Healthwise, [employer]. Care instructions adapted under license by [employer] (which disclaims liability or warranty for this information). If you have questions about a medical condition or this instruction, always ask your healthcare professional. [address] any warranty or liability for your use of this information. Deciding About Life-Prolonging Treatment Deciding About Life-Prolonging Treatment What is life-prolonging treatment? There are many kinds of treatment that can help you live longer. These may be needed for only a short time until your illness improves. Or you may use them over the long term to help keep you alive. Some treatments include the use of: · Medicines to slow the progress of certain diseases, such as heart disease, diabetes, cancer, AIDS, or Alzheimer's disease. · Antibiotics to treat serious infections, such as pneumonia. · Dialysis to clean your blood if your kidneys stop working. · A breathing machine to help you breathe if you can't breathe on your own. This machine pumps air into your lungs through a tube put into your throat. · A feeding tube or an intravenous (IV) line to give you food and fluids if you can't eat or drink. · Cardiopulmonary resuscitation (CPR) to try to restart your heart. The decision to receive treatments that may help you live longer is a personal one. You may want your doctor to do everything possible to keep you alive, even when your chance for recovery is small. Or you may choose to only have care to manage your pain and other symptoms. What are key points about this decision? · If there is a good chance that your illness can be cured or managed, your doctor may advise you to first try available treatments. If these don't work, then you might think about stopping treatment. · If you stop treatment, you will still receive care that focuses on pain relief and comfort. · A decision to stop treatment that keeps you alive does not have to be permanent. You can always change your mind if your health starts to improve. · Even though treatment focuses on helping you live longer, it may cause side effects that can greatly affect your quality of life. And it could affect how you spend time with your family and friends. · If you still have personal goals that you want to pursue, you may want treatment that keeps you alive long enough to reach them. Why might you choose life-prolonging treatment? · There is a good chance that your illness can be cured or managed. · You think you can manage the possible side effects of treatment. · You don't think treatment will get in the way of your quality of life. · You have personal goals that you still want to pursue and achieve. Why might you choose to stop life-prolonging treatment? · Your chance of surviving your illness is very low. · You have tried all possible treatments for your illness, but they have not helped. · You can no longer deal with the side effects of treatment. · You have already met the goals you set out to achieve in your life. Your decision Thinking about the facts and your feelings can help you make a decision that is right for you. Be sure you understand the benefits and risks of your options, and think about what else you need to do before you make the decision. Where can you learn more? Go to http://paola-shane.info/. Enter S006 in the search box to learn more about \"Deciding About Life-Prolonging Treatment. \" Current as of: September 24, 2016 Content Version: 11.4 © 1724-9416 Healthwise[employer] Incorporated. Care instructions adapted under license by [employer] (which disclaims liability or warranty for this information).  If you have questions about a medical condition or this instruction, always ask your healthcare professional. Mireya Simeon, [employer] disclaims any warranty or liability for your use of this information. Introducing [facility]! Nell Tong introduces Roller patient portal. Now you can access parts of your medical record, email your doctor's office, and request medication refills online. 1. In your internet browser, go to [email or web address] 2. Click on the First Time User? Click Here link in the Sign In box. You will see the New Member Sign Up page. 3. Enter your Roller Access Code exactly as it appears below. You will not need to use this code after youve completed the sign-up process. If you do not sign up before the expiration date, you must request a new code. · Roller Access Code: [email or web address] Expires: 5/1/2018 10:52 AM 
 
4. Enter the last four digits of your Social Security Number (xxxx) and Date of Birth (mm/dd/yyyy) as indicated and click Submit. You will be taken to the next sign-up page. 5. Create a Roller ID. This will be your Roller login ID and cannot be changed, so think of one that is secure and easy to remember. 6. Create a Roller password. You can change your password at any time. 7. Enter your Password Reset Question and Answer. This can be used at a later time if you forget your password. 8. Enter your e-mail address. You will receive e-mail notification when new information is available in [address] 9. Click Sign Up. You can now view and download portions of your medical record. 10. Click the Download Summary menu link to download a portable copy of your medical information. If you have questions, please visit the Frequently Asked Questions section of the Roller website. Remember, Roller is NOT to be used for urgent needs. For medical emergencies, dial 911. Now available from your iPhone and Android! Please provide this summary of care documentation to your next provider. Your primary care clinician is listed as CATHRYN Seymour. If you have any questions after today's visit, please call [phone].

## 2018-01-31 NOTE — PROGRESS NOTES
Moni Ferguson is a 80 y.o. female and presents with Annual Wellness Visit (medicare wellness)    Subjective:  Here for [address] Recently seen at [facility] to f/u right breast lumpectomy for survelliance. Had normal CT and labs drawn. No breast pain or skin changes reported. Also continues to have bilateral knee pain. Limits activity due to severe pain. Primarily rubs knee for relief and given tramadol PRN by daughter. Pain Scale: 0 - No pain/10. Hypertension Review:  The patient has hypertension  Diet and Lifestyle: generally does try to follow a  low sodium diet, exercises sporadically   Home BP Monitoring: is not measured at home. Pertinent ROS: taking medications as instructed, no medication side effects noted. No TIA's, chest pain on exertion, dyspnea on exertion, or swelling of ankles.    BP Readings from Last 3 Encounters:   01/31/18 142/64   10/20/17 120/75   07/17/17 136/65       Review of Systems  Constitutional: negative for fevers, chills, anorexia and weight loss  Eyes:   negative for visual disturbance, drainage, and irritation  ENT:   negative for tinnitus,sore throat,nasal congestion,ear pain,and hoarseness  Respiratory:  negative for cough, hemoptysis, dyspnea, and wheezing  CV:   negative for chest pain, palpitations, and lower extremity edema  GI:   negative for nausea, vomiting, diarrhea, abdominal pain, and melena  Endo:               negative for polyuria,polydipsia,polyphagia, and heat intolerance  Genitourinary: negative for frequency, urgency, dysuria, retention, and hematuria  Integument:  negative for rash, ulcerations  Hematologic:  negative for easy bruising and bleeding  Musculoskel: negative for muscle weakness  Neurological:  negative for headaches, dizziness, vertigo,and memory problems  Behavl/Psych: negative for feelings of anxiety, depression, suicide, and mood changes    Past Medical History:   Diagnosis Date    Cancer ([facility] [address]     right breast.    Chronic pain  CVA     HTN (hypertension) 2/9/2011    Hypertension     Microscopic hematuria 5/11/2011    Other and unspecified hyperlipidemia 2/9/2011    Subclinical hyperthyroidism 8/29/2015    Venous thrombosis 2/17/2010    left arm DVT     Past Surgical History:   Procedure Laterality Date    BREAST SURGERY PROCEDURE UNLISTED      HX HEENT      HX HEMORRHOIDECTOMY       Social History     Social History    Marital status:      Spouse name: N/A    Number of children: N/A    Years of education: N/A     Social History Main Topics    Smoking status: Former Smoker    Smokeless tobacco: Never Used      Comment: smoked for 10 yrs    Alcohol use 0.5 oz/week     1 Cans of beer per week      Comment: occ    Drug use: No    Sexual activity: No     Other Topics Concern    None     Social History Narrative     Family History   Problem Relation Age of Onset    Stroke Mother     Stroke Father     Diabetes Daughter      Current Outpatient Prescriptions   Medication Sig Dispense Refill    diclofenac (VOLTAREN) 1 % gel Apply 2 g to affected area every six (6) hours. 100 g 2    traMADol (ULTRAM) 50 mg tablet Take 1 Tab by mouth daily as needed for Pain. Indications: Pain 30 Tab 2    methIMAzole (TAPAZOLE) 5 mg tablet Take 1 Tab by mouth every Monday, Wednesday, Friday. 30 Tab 11    metoprolol succinate (TOPROL-XL) 50 mg XL tablet Take 1 Tab by mouth daily. 90 Tab 0    lactulose (CHRONULAC) 10 gram/15 mL solution Take 15 mL by mouth two (2) times daily as needed. For severe CONSTIPATION  Indications: constipation 480 mL 1    docusate sodium (COLACE) 100 mg capsule Take 1 Cap by mouth two (2) times a day. 60 Cap 11    lisinopril (PRINIVIL, ZESTRIL) 20 mg tablet Take 1 Tab by mouth daily. 90 Tab 3    amLODIPine (NORVASC) 5 mg tablet Take 1 Tab by mouth daily. 30 Tab 11    anastrozole (ARIMIDEX) 1 mg tablet TAKE 1 TABLET BY MOUTH DAILY  3    simvastatin (ZOCOR) 20 mg tablet Take 1 Tab by mouth nightly.  27 Tab 11    ergocalciferol (ERGOCALCIFEROL) 50,000 unit capsule Take 1 Cap by mouth every seven (7) days. 12 Cap 4    polyethylene glycol (MIRALAX) 17 gram packet Take 1 Packet by mouth two (2) times daily as needed. 60 Packet 11    mometasone (ELOCON) 0.1 % topical cream Apply  to affected area daily. 15 g 1    ranitidine (ZANTAC) 150 mg tablet Take 1 Tab by mouth two (2) times daily as needed for Indigestion. Take for acid reflux 60 Tab 11    gabapentin (NEURONTIN) 100 mg capsule TAKE 1 CAP BY MOUTH NIGHTLY AS NEEDED. 20 Cap 1    hydrocortisone (ANUSOL-HC) 2.5 % rectal cream Insert  into rectum four (4) times daily. 45 g 5    aspirin delayed-release 81 mg tablet Take 1 Tab by mouth daily. 90 Tab 3     No Known Allergies    Objective:  Visit Vitals    /64 (BP 1 Location: Left arm, BP Patient Position: Sitting)    Pulse 70    Temp 97.2 °F (36.2 °C) (Oral)    Resp 18    Ht 5' (1.524 m)    Wt 130 lb (59 kg)    SpO2 94%    BMI 25.39 kg/m2     Wt Readings from Last 3 Encounters:   01/31/18 130 lb (59 kg)   10/20/17 131 lb (59.4 kg)   07/17/17 131 lb (59.4 kg)     Physical Exam:    General appearance - alert, well appearing, and in no distress. Seated comfortably in W/C. Mental status - A/O x 4, normal mood and affect. Neck -Supple ,normal CSP. FROM, non-tender. No significant adenopathy/thyromegaly. No JVD. Chest - CTA. Symmetric chest rise. No wheezing, rales or rhonchi. Heart - Normal rate, regular rhythm. Normal S1, S2. No MGR or clicks. Abdomen - Soft,non-distended. Normoactive BS in all quadrants. NT, no mass or HSM. No CVAT. Ext- Radial, DP pulses, 2+ bilaterally. No pedal edema, clubbing, or cyanosis. Skin-Warm and dry. No clubbing or cyanosis. Hyperpigmentation to multiple nevi and skin tags. Neuro - Normal speech, no focal findings or movement disorder. Normal strength and muscle tone. Presents in [address], but walks seated since feet are taken off chair.  No use of arms to propel self forward. Right Knee- LROM. no joint line tenderness, but enlarged. Assessment of cognitive impairment: Alert and oriented x 4    Depression Screen:   PHQ over the last two weeks 1/31/2018   Little interest or pleasure in doing things Not at all   Feeling down, depressed or hopeless Not at all   Total Score PHQ 2 0       Fall Risk Assessment:    Fall Risk Assessment, last 12 mths 1/31/2018   Able to walk? Yes   Fall in past 12 months? No   Fall with injury? -   Number of falls in past 12 months -   Fall Risk Score -       Abuse Assessment: Patient NOT domesticly abuse (verbal, physical, and financial)    Current Alcohol use: NO   reports that she drinks about 0.5 oz of alcohol per week     Functional Ability:   Does the patient exhibit a steady gait? Yes   How long did it take the patient to get up and walk from a sitting position? 4 seconds   Is the patient self reliant?  (ie can do own laundry, meals, household chores) NO, daughters help     Does the patient handle his/her own medications? No, daughter's manage     Does the patient handle his/her own money? No, daughter's manage     Is the patients home safe (ie good lighting, handrails on stairs and bath, etc.)? Yes   Did you notice or did patient express any hearing difficulties? no   Did you notice of did patient express any vision difficulties?  no   Were distance and reading eye charts used? n/a     Advance Care Planning:   Patient was offered the opportunity to discuss advance care planning:  Yes   Does patient have an Advance Directive: No   If no, did you provide information and forms?   yes     Health Maintenance   Topic Date Due    Pneumococcal 65+ Low/Medium Risk (1 of 2 - PCV13) 05/01/2018 (Originally 7/1/1995)    GLAUCOMA SCREENING Q2Y  09/12/2018    MEDICARE YEARLY EXAM  02/01/2019    DTaP/Tdap/Td series (2 - Td) 09/16/2026    OSTEOPOROSIS SCREENING (DEXA)  Completed    ZOSTER VACCINE AGE 60>  Addressed    Influenza Age 5 to Adult Addressed       Assessment/Plan:  Chronic pain-Refilled tramadol.  was reviewed while planning for pain management, no indications of drug diversion suspected. Prescription history is not suspicious for controlled substance overuse. voltaren gel added also. Will collect labs next OV. See below for other orders   Follow-up Disposition:  Return in about 6 months (around 7/31/2018) for HTN, pain med refill, labs. ICD-10-CM ICD-9-CM    1. Medicare annual wellness visit, subsequent Z00.00 V70.0    2. Advance care planning Z71.89 V65.49    3. Bilateral chronic knee pain M25.561 719.46 diclofenac (VOLTAREN) 1 % gel    M25.562 338.29 traMADol (ULTRAM) 50 mg tablet    G89.29     4. History of breast cancer Z85.3 V10.3    5. S/P lumpectomy, right breast Z98.890 V45.89    6. HTN (hypertension), benign I10 401.1      Orders Placed This Encounter    diclofenac (VOLTAREN) 1 % gel     Sig: Apply 2 g to affected area every six (6) hours. Dispense:  100 g     Refill:  2    traMADol (ULTRAM) 50 mg tablet     Sig: Take 1 Tab by mouth daily as needed for Pain. Indications: Pain     Dispense:  30 Tab     Refill:  2       Alfred Doll expressed understanding of plan. An After Visit Summary was offered/printed and given to the patient. The patient's abnormal BMI was reviewed and deemed target BMI for the patient. An After Visit Summary was provided to the patient and/or caregiver.

## 2018-01-31 NOTE — PROGRESS NOTES
Pt is here for   Chief Complaint   Patient presents with    Annual Wellness Visit     medicare wellness     Pt denies pain at this time. 1. Have you been to the ER, urgent care clinic since your last visit? Hospitalized since your last visit? No    2. Have you seen or consulted any other health care providers outside of the [address] [place] [address] since your last visit? Include any pap smears or colon screening.  No

## 2018-01-31 NOTE — ACP (ADVANCE CARE PLANNING)
Advanced care planning- discussed Advance directive, Medical POA, and life sustaining options. Given/Mailing honoring [employer] paper work and contact info for [facility] to complete paperwork in office. Advance Care Planning (ACP) Provider Conversation Snapshot    Date of ACP Conversation: 01/31/18  Persons included in Conversation:  Patient/family  Length of ACP Conversation in minutes:  5-10 minutes    Authorized Decision Maker (if patient is incapable of making informed decisions): This person is:   Healthcare Agent/Medical Power of  under Advance Directive  Robin Del Castillo (daughter) primary          For Patients with Decision Making Capacity:   Values/Goals: Exploration of values, goals, and preferences if recovery is not expected, even with continued medical treatment in the event of:  Severe, permanent brain injury  \"In these circumstances, what matters most to you? \"  Care focused more on comfort or quality of life.     Conversation Outcomes / Follow-Up Plan: DNR  Recommended completion of Advance Directive form after review of ACP materials and conversation with prospective healthcare agent   Referral made for ACP follow-up assistance to:  Nurse navigator

## 2018-03-16 DIAGNOSIS — E78.2 MIXED HYPERLIPIDEMIA: ICD-10-CM

## 2018-03-16 RX ORDER — SIMVASTATIN 20 MG/1
20 TABLET, FILM COATED ORAL
Qty: 30 TAB | Refills: 0 | Status: SHIPPED | OUTPATIENT
Start: 2018-03-16 | End: 2018-05-16 | Stop reason: SDUPTHER

## 2018-04-17 RX ORDER — METOPROLOL SUCCINATE 50 MG/1
50 TABLET, EXTENDED RELEASE ORAL DAILY
Qty: 90 TAB | Refills: 0 | Status: SHIPPED | OUTPATIENT
Start: 2018-04-17 | End: 2018-07-25 | Stop reason: SDUPTHER

## 2018-05-16 DIAGNOSIS — E78.2 MIXED HYPERLIPIDEMIA: ICD-10-CM

## 2018-05-16 RX ORDER — SIMVASTATIN 20 MG/1
20 TABLET, FILM COATED ORAL
Qty: 30 TAB | Refills: 0 | Status: SHIPPED | OUTPATIENT
Start: 2018-05-16 | End: 2018-06-26 | Stop reason: SDUPTHER

## 2018-06-26 DIAGNOSIS — E78.2 MIXED HYPERLIPIDEMIA: ICD-10-CM

## 2018-06-27 RX ORDER — SIMVASTATIN 20 MG/1
20 TABLET, FILM COATED ORAL
Qty: 30 TAB | Refills: 11 | Status: SHIPPED | OUTPATIENT
Start: 2018-06-27 | End: 2018-09-21 | Stop reason: SDUPTHER

## 2018-07-24 DIAGNOSIS — I10 HTN (HYPERTENSION), BENIGN: ICD-10-CM

## 2018-07-24 RX ORDER — LISINOPRIL 20 MG/1
20 TABLET ORAL DAILY
Qty: 90 TAB | Refills: 3 | Status: SHIPPED | OUTPATIENT
Start: 2018-07-24 | End: 2019-08-01 | Stop reason: SDUPTHER

## 2018-07-25 RX ORDER — METOPROLOL SUCCINATE 50 MG/1
50 TABLET, EXTENDED RELEASE ORAL DAILY
Qty: 90 TAB | Refills: 1 | Status: SHIPPED | OUTPATIENT
Start: 2018-07-25 | End: 2019-02-15 | Stop reason: SDUPTHER

## 2018-07-31 ENCOUNTER — OFFICE VISIT (OUTPATIENT)
Dept: INTERNAL MEDICINE CLINIC | Age: 83
End: 2018-07-31

## 2018-07-31 VITALS
BODY MASS INDEX: 25.52 KG/M2 | HEART RATE: 65 BPM | TEMPERATURE: 97.6 F | OXYGEN SATURATION: 100 % | SYSTOLIC BLOOD PRESSURE: 112 MMHG | HEIGHT: 60 IN | DIASTOLIC BLOOD PRESSURE: 54 MMHG | WEIGHT: 130 LBS | RESPIRATION RATE: 20 BRPM

## 2018-07-31 DIAGNOSIS — K59.04 CHRONIC IDIOPATHIC CONSTIPATION: ICD-10-CM

## 2018-07-31 DIAGNOSIS — Z02.89 PAIN MEDICATION AGREEMENT SIGNED: ICD-10-CM

## 2018-07-31 DIAGNOSIS — F11.90 CHRONIC, CONTINUOUS USE OF OPIOIDS: ICD-10-CM

## 2018-07-31 DIAGNOSIS — G89.29 BILATERAL CHRONIC KNEE PAIN: ICD-10-CM

## 2018-07-31 DIAGNOSIS — E78.2 MIXED HYPERLIPIDEMIA: ICD-10-CM

## 2018-07-31 DIAGNOSIS — M25.561 BILATERAL CHRONIC KNEE PAIN: ICD-10-CM

## 2018-07-31 DIAGNOSIS — I10 HTN (HYPERTENSION), BENIGN: Primary | ICD-10-CM

## 2018-07-31 DIAGNOSIS — M25.562 BILATERAL CHRONIC KNEE PAIN: ICD-10-CM

## 2018-07-31 RX ORDER — LUBIPROSTONE 24 UG/1
24 CAPSULE, GELATIN COATED ORAL 2 TIMES DAILY WITH MEALS
Qty: 60 CAP | Refills: 5 | Status: SHIPPED | OUTPATIENT
Start: 2018-07-31 | End: 2019-07-18 | Stop reason: SDUPTHER

## 2018-07-31 RX ORDER — TRAMADOL HYDROCHLORIDE 50 MG/1
50 TABLET ORAL
Qty: 30 TAB | Refills: 2 | Status: SHIPPED | OUTPATIENT
Start: 2018-07-31 | End: 2018-12-04 | Stop reason: SDUPTHER

## 2018-07-31 NOTE — LETTER
Levi Degree QUL:[birth year] MR #:[id number] Provider Name:Flower Guzmanpadmini Sood NP  
*[id number]-793* BSMG-491 (5/16) Page 1 of 5 Initial [employer] CONTROLLED SUBSTANCE AGREEMENT I may be prescribed medications that are controlled substances as part  of my treatment plan for management of my medical condition(s). The goal of my treatment plan is to maintain and/or improve my health and wellbeing. Because controlled substances have an increased risk of abuse or harm, continual re-evaluation is needed determine if the goals of my treatment plan are being met for my safety and the safety of others. Vinny Carol  am entering into this Controlled Substance Agreement with my provider, Meryle Cheers, NP at [facility] . I understand that successful treatment requires mutual trust and honesty between me and my provider. I understand that there are state and federal laws and regulations which apply to the medications that my provider may prescribe that must be followed. I understand there are risks and benefits ts of taking the medicines that my provider may prescribe. I understand and agree that following this Agreement is necessary in continuing my provider-patient relationship and success of my treatment plan. As a part of my treatment plan, I agree to the following: COMMUNICATION: 
 
1. I will communicate fully with my provider about my medical condition(s), including the effect on my daily life and how well my medications are helping. I will tell my provider all of the medications that I take for any reason, including medications I receive from another health care provider, and will notify my provider about all issues, problems or concerns, including any side effects, which may be related to my medications. I understand that this information allows my provider to adjust my treatment plan to help manage my medical condition.  I understand that this information will become part of my permanent medical record. 2. I will notify my provider if I have a history of alcohol/drug misuse/addiction or if I have had treatment for alcohol/drug addiction in the past, or if I have a new problem with or concern about alcohol/drug use/addiction, because this increases the likelihood of high risk behaviors and may lead to serious medical conditions. 3. Females Only: I will notify my provider if I am or become pregnant, or if I intend to become pregnant, or if I intend to breastfeed. I understand that communication of these issues with my provider is important, due to possible effects my medication could have on an unborn fetus or breastfeeding child. Ora Falcon MICHAEL:[birth year] MR #:[id number] Provider Name:Flower Elizabeth NP  
*BKMP-758* BSMG-491 (5/16) Page 2 of 5 Initial [employer] MISUSE OF MEDICATIONS / DRUGS: 
 
1. I agree to take all controlled substances as prescribed, and will not misuse or abuse any controlled substances prescribed by my provider. For my safety, I will not increase the amount of medicine I take without first talking with and getting permission from my provider. 2. If I have a medical emergency, another health care provider may prescribe me medication. If I seek emergency treatment, I will notify my provider within seventy-two (72) hours. 3. I understand that my provider may discuss my use and/or possible misuse/abuse of controlled substances and alcohol, as appropriate, with any health care provider involved in my care, pharmacist or legal authority. ILLEGAL DRUGS: 
 
1. I will not use illegal drugs of any kind, including but not limited to marijuana, heroin, cocaine, or any prescription drug which is not prescribed to me. DRUG DIVERSION / PRESCRIPTION FRAUD: 
 
1. I will not share, sell, trade, give away, or otherwise misuse my prescriptions or medications. 2. I will not alter any prescriptions provided to me by my provider. SINGLE PROVIDER: 
 
1. I agree that all controlled substances that I take will be prescribed only by my provider (or his/her covering provider) under this Agreement. This agreement does not prevent me from seeking emergency medical treatment or receiving pain management related to a surgery. PROTECTING MEDICATIONS: 
 
1. I am responsible for keeping my prescriptions and medications in a safe and secure place including safeguarding them from loss or theft. I understand that lost, stolen or damaged/destroyed prescriptions or medications will not be replaced. Kristal Lynn VVQ:8[birth year] MR #:[id number] Provider Name:Flower Hall Markus, NP  
*IKFA-813* BSMG-491 (5/16) Page 3 of 5 Initial [employer] PRESCRIPTION RENEWALS/REFILLS: 
 
1. I will follow my controlled substance medication schedule as prescribed by my provider. 2. I understand and agree that I will make any requests for renewals or refills of my prescriptions only at the time of an office visit or during my providers regular office hours subject to the prescription refill requirements of the individual practice. 3. I understand that my provider may not call in prescriptions for controlled substances to my pharmacy. 4. I understand that my provider may adjust or discontinue these medications as deemed appropriate for my medical treatment plan. This Agreement does not guarantee the prescription of controlled medications. 5. I agree that if my medications are adjusted or discontinued, I will properly dispose of any remaining medications. I understand that I will be required to dispose of any remaining controlled medications prior to being provided with any prescriptions for other controlled medications.  
 
 
1. I authorize my provider and my pharmacy to cooperate fully with any local, state, or federal law enforcement agency in the investigation of any possible misuse, sale, or other diversion of my controlled substance prescriptions or medications. RISKS: 
 
 
1. I understand that if I do not adhere to this Agreement in any way, my provider may change my prescriptions, stop prescribing controlled substances or end our provider-patient relationship. 2. If my provider decides to stop prescribing medication, or decides to end our provider-patient relationship,my provider may require that I taper my medications slowly. If necessary, my provider may also provide a prescription for other medications to treat my withdrawal symptoms. UNDERSTANDING THIS AGREEMENT: 
 
I understand that my provider may adjust or stop my prescriptions for controlled substances based on my medical condition and my treatment plan. I understand that this Agreement does not guarantee that I will be prescribed medications or controlled substances. I understand that controlled substances may be just one part 
of my treatment plan.  
 
My initial on each page and my signature below shows that I have read each page of this Agreement, I have had an opportunity to ask questions, and all of my questions have been answered to my satisfaction by my provider. By signing below, I agree to comply with this Agreement, and I understand that if I do not follow the Agreements listed above, my provider may stop 
 
 
 
_________________________________________  Date/Time 7/31/2018 1:28 PM   
             (Patient Signature)

## 2018-07-31 NOTE — PATIENT INSTRUCTIONS
Constipation: Care Instructions  Your Care Instructions    Constipation means that you have a hard time passing stools (bowel movements). People pass stools from 3 times a day to once every 3 days. What is normal for you may be different. Constipation may occur with pain in the rectum and cramping. The pain may get worse when you try to pass stools. Sometimes there are small amounts of bright red blood on toilet paper or the surface of stools. This is because of enlarged veins near the rectum (hemorrhoids). A few changes in your diet and lifestyle may help you avoid ongoing constipation. Your doctor may also prescribe medicine to help loosen your stool. Some medicines can cause constipation. These include pain medicines and antidepressants. Tell your doctor about all the medicines you take. Your doctor may want to make a medicine change to ease your symptoms. Follow-up care is a key part of your treatment and safety. Be sure to make and go to all appointments, and call your doctor if you are having problems. It's also a good idea to know your test results and keep a list of the medicines you take. How can you care for yourself at home? · Drink plenty of fluids, enough so that your urine is light yellow or clear like water. If you have kidney, heart, or liver disease and have to limit fluids, talk with your doctor before you increase the amount of fluids you drink. · Include high-fiber foods in your diet each day. These include fruits, vegetables, beans, and whole grains. · Get at least 30 minutes of exercise on most days of the week. Walking is a good choice. You also may want to do other activities, such as running, swimming, cycling, or playing tennis or team sports. · Take a fiber supplement, such as Citrucel or Metamucil, every day. Read and follow all instructions on the label. · Schedule time each day for a bowel movement. A daily routine may help.  Take your time having your bowel movement. · Support your feet with a small step stool when you sit on the toilet. This helps flex your hips and places your pelvis in a squatting position. · Your doctor may recommend an over-the-counter laxative to relieve your constipation. Examples are Milk of Magnesia and MiraLax. Read and follow all instructions on the label. Do not use laxatives on a long-term basis. When should you call for help? Call your doctor now or seek immediate medical care if:    · You have new or worse belly pain.     · You have new or worse nausea or vomiting.     · You have blood in your stools.    Watch closely for changes in your health, and be sure to contact your doctor if:    · Your constipation is getting worse.     · You do not get better as expected. Where can you learn more? Go to http://paola-shane.info/. Enter 21 [phone] in the search box to learn more about \"Constipation: Care Instructions. \"  Current as of: November 20, 2017  Content Version: 11.7  © 2861-8631 [employer]. Care instructions adapted under license by [employer] (which disclaims liability or warranty for this information). If you have questions about a medical condition or this instruction, always ask your healthcare professional. [address] any warranty or liability for your use of this information. Lubiprostone (By mouth)   Lubiprostone (dex-qx-ZSZLN-one)  Treats constipation. Also treats irritable bowel syndrome (IBS) with constipation in women. This medicine is a stool softener. Brand Name(s): Amitiza   There may be other brand names for this medicine. When This Medicine Should Not Be Used: This medicine is not right for everyone. Do not use it if you had an allergic reaction to lubiprostone, or if you have stomach or bowel blockage. How to Use This Medicine:   Capsule, Liquid Filled Capsule  · Your doctor will tell you how much medicine to use.  Do not use more than directed. · Take this medicine with food and water. This will help lessen the chance of nausea. · Swallow the capsule whole. Do not crush, break, or chew it. · Missed dose: Take a dose as soon as you remember. If it is almost time for your next dose, wait until then and take a regular dose. Do not take extra medicine to make up for a missed dose. · Store the medicine in a closed container at room temperature, away from heat, moisture, and direct light. Drugs and Foods to Avoid:   Ask your doctor or pharmacist before using any other medicine, including over-the-counter medicines, vitamins, and herbal products. · Some medicines can affect how lubiprostone works. Tell your doctor if you are using methadone or a blood pressure medicine. Warnings While Using This Medicine:   · Tell your doctor if you are pregnant or breastfeeding, or if you have liver disease, low blood pressure, severe diarrhea, or a sudden change in bowel movements. · This medicine may make you feel dizzy or lightheaded. Stand or sit up slowly if you are dizzy. This is more likely to happen when you begin to use the medicine or if you become dehydrated. · Your doctor will check your progress and the effects of this medicine at regular visits. Keep all appointments. · Keep all medicine out of the reach of children. Never share your medicine with anyone.   Possible Side Effects While Using This Medicine:   Call your doctor right away if you notice any of these side effects:  · Allergic reaction: Itching or hives, swelling in your face or hands, swelling or tingling in your mouth or throat, chest tightness, trouble breathing  · Lightheadedness, dizziness, fainting  · Severe diarrhea, stomach pain or swelling  · Severe nausea or vomiting  · Trouble breathing  If you notice these less serious side effects, talk with your doctor:   · Headache  · Mild nausea  If you notice other side effects that you think are caused by this medicine, tell your doctor. Call your doctor for medical advice about side effects. You may report side effects to FDA at 1-486-FDA-6792  © 2017 [address] [employer] Information is for End User's use only and may not be sold, redistributed or otherwise used for commercial purposes. The above information is an  only. It is not intended as medical advice for individual conditions or treatments. Talk to your doctor, nurse or pharmacist before following any medical regimen to see if it is safe and effective for you.

## 2018-07-31 NOTE — PROGRESS NOTES
Chief Complaint   Patient presents with    Hypertension     f/u    Knee Pain     f/u     1. Have you been to the ER, urgent care clinic since your last visit? Hospitalized since your last visit? No    2. Have you seen or consulted any other health care providers outside of the [address] [place] [address] since your last visit? Include any pap smears or colon screening. No  Fall Risk Assessment, last 12 mths 7/31/2018   Able to walk? Yes   Fall in past 12 months? No   Fall with injury? -   Number of falls in past 12 months -   Fall Risk Score -     Abuse Screening Questionnaire 7/31/2018   Do you ever feel afraid of your partner? N   Are you in a relationship with someone who physically or mentally threatens you? N   Is it safe for you to go home?  Benton Coffey

## 2018-07-31 NOTE — MR AVS SNAPSHOT
[address] [place], [place] [address] [id number] 
[phone] Patient: Tiffani Wilkinson MRN: [id number] KDV:[id number] Visit Information Date & Time Provider Department Dept. Phone Encounter #  
 7/31/2018  1:00 PM Irina Norton NP [address] [facility] [phone] [id number] Follow-up Instructions Return in about 12 weeks (around 10/23/2018) for constipation, knee pain. Your Appointments 8/15/2018 12:15 PM  
ESTABLISHED PATIENT with MD Jordan Cedeon TURNER, [address] [place] [address] ([facility]) Appt Note: 6 month f/u  
 [address] [facility] 7 [address]  
[phone]  
  
   
 [address] [facility] 7 [address] Upcoming Health Maintenance Date Due Pneumococcal 65+ Low/Medium Risk (1 of 2 - PCV13) 7/1/1995 Influenza Age 5 to Adult 8/1/2018 GLAUCOMA SCREENING Q2Y 6/27/2020 DTaP/Tdap/Td series (2 - Td) 9/16/2026 Allergies as of 7/31/2018  Review Complete On: 7/31/2018 By: Kati Kennedy LPN No Known Allergies Current Immunizations  Never Reviewed Name Date Influenza High Dose Vaccine PF 10/20/2017 Not reviewed this visit You Were Diagnosed With   
  
 Codes Comments HTN (hypertension), benign    -  Primary ICD-10-CM: I10 
ICD-9-CM: 401.1 Mixed hyperlipidemia     ICD-10-CM: E78.2 ICD-9-CM: 272.2 Bilateral chronic knee pain     ICD-10-CM: M25.561, M25.562, G89.29 ICD-9-CM: 719.46, 338.29 Pain medication agreement signed     ICD-10-CM: Z02.89 ICD-9-CM: V58.69 Chronic, continuous use of opioids     ICD-10-CM: F11.90 ICD-9-CM: 305.51 Chronic idiopathic constipation     ICD-10-CM: K59.04 
ICD-9-CM: 564.00 Vitals BP Pulse Temp Resp Height(growth percentile) Weight(growth percentile) 112/54 (BP 1 Location: Left arm, BP Patient Position: Sitting) 65 97.6 °F (36.4 °C) (Oral) 20 5' (1.524 m) 130 lb (59 kg) SpO2 BMI OB Status Smoking Status 100% 25.39 kg/m2 Postmenopausal Former Smoker BMI and BSA Data Body Mass Index Body Surface Area  
 25.39 kg/m 2 1.58 m 2 Preferred Pharmacy Pharmacy Name Phone [id number] Amaro, [address] [facility] [phone] Your Updated Medication List  
  
   
This list is accurate as of 7/31/18  1:45 PM.  Always use your most recent med list. amLODIPine 5 mg tablet Commonly known as:  Wandra Deneen Take 1 Tab by mouth daily. anastrozole 1 mg tablet Commonly known as:  ARIMIDEX TAKE 1 TABLET BY MOUTH DAILY  
  
 aspirin delayed-release 81 mg tablet Take 1 Tab by mouth daily. diclofenac 1 % Gel Commonly known as:  VOLTAREN Apply 2 g to affected area every six (6) hours. docusate sodium 100 mg capsule Commonly known as:  Doreen Donate Take 1 Cap by mouth two (2) times a day. ergocalciferol 50,000 unit capsule Commonly known as:  ERGOCALCIFEROL Take 1 Cap by mouth every seven (7) days. gabapentin 100 mg capsule Commonly known as:  NEURONTIN  
TAKE 1 CAP BY MOUTH NIGHTLY AS NEEDED. hydrocortisone 2.5 % rectal cream  
Commonly known as:  ANUSOL-HC Insert  into rectum four (4) times daily. lactulose 10 gram/15 mL solution Commonly known as:  Sapna Monks Take 15 mL by mouth two (2) times daily as needed. For severe CONSTIPATION  Indications: constipation  
  
 lisinopril 20 mg tablet Commonly known as:  [place] Take 1 Tab by mouth daily. lubiPROStone 24 mcg capsule Commonly known as:  Gabriela Ling Take 1 Cap by mouth two (2) times daily (with meals). Indications: chronic idiopathic constipation  
  
 methIMAzole 5 mg tablet Commonly known as:  TAPAZOLE Take 1 Tab by mouth every Monday, Wednesday, Friday. metoprolol succinate 50 mg XL tablet Commonly known as:  TOPROL-XL Take 1 Tab by mouth daily. mometasone 0.1 % topical cream  
Commonly known as:  Piter Mediate Apply  to affected area daily. raNITIdine 150 mg tablet Commonly known as:  ZANTAC Take 1 Tab by mouth two (2) times daily as needed for Indigestion. Take for acid reflux  
  
 simvastatin 20 mg tablet Commonly known as:  ZOCOR Take 1 Tab by mouth nightly. traMADol 50 mg tablet Commonly known as:  ULTRAM  
Take 1 Tab by mouth daily as needed for Pain. Indications: Pain Prescriptions Printed Refills  
 traMADol (ULTRAM) 50 mg tablet 2 Sig: Take 1 Tab by mouth daily as needed for Pain. Indications: Pain Class: Print Route: Oral  
  
Prescriptions Sent to Pharmacy Refills  
 lubiPROStone (AMITIZA) 24 mcg capsule 5 Sig: Take 1 Cap by mouth two (2) times daily (with meals). Indications: chronic idiopathic constipation Class: Normal  
 Pharmacy: [address] [place] [address] #: [phone] Route: Oral  
  
We Performed the Following CBC W/O DIFF [41686 CPT(R)] DRUG SCREEN 11 W/CONF, SERUM [[id number] Custom] LIPID PANEL [81174 CPT(R)] METABOLIC PANEL, COMPREHENSIVE [29062 CPT(R)] Follow-up Instructions Return in about 12 weeks (around 10/23/2018) for constipation, knee pain. Patient Instructions Constipation: Care Instructions Your Care Instructions Constipation means that you have a hard time passing stools (bowel movements). People pass stools from 3 times a day to once every 3 days. What is normal for you may be different. Constipation may occur with pain in the rectum and cramping. The pain may get worse when you try to pass stools. Sometimes there are small amounts of bright red blood on toilet paper or the surface of stools. This is because of enlarged veins near the rectum (hemorrhoids).  
A few changes in your diet and lifestyle may help you avoid ongoing constipation. Your doctor may also prescribe medicine to help loosen your stool. Some medicines can cause constipation. These include pain medicines and antidepressants. Tell your doctor about all the medicines you take. Your doctor may want to make a medicine change to ease your symptoms. Follow-up care is a key part of your treatment and safety. Be sure to make and go to all appointments, and call your doctor if you are having problems. It's also a good idea to know your test results and keep a list of the medicines you take. How can you care for yourself at home? · Drink plenty of fluids, enough so that your urine is light yellow or clear like water. If you have kidney, heart, or liver disease and have to limit fluids, talk with your doctor before you increase the amount of fluids you drink. · Include high-fiber foods in your diet each day. These include fruits, vegetables, beans, and whole grains. · Get at least 30 minutes of exercise on most days of the week. Walking is a good choice. You also may want to do other activities, such as running, swimming, cycling, or playing tennis or team sports. · Take a fiber supplement, such as Citrucel or Metamucil, every day. Read and follow all instructions on the label. · Schedule time each day for a bowel movement. A daily routine may help. Take your time having your bowel movement. · Support your feet with a small step stool when you sit on the toilet. This helps flex your hips and places your pelvis in a squatting position. · Your doctor may recommend an over-the-counter laxative to relieve your constipation. Examples are Milk of Magnesia and MiraLax. Read and follow all instructions on the label. Do not use laxatives on a long-term basis. When should you call for help? Call your doctor now or seek immediate medical care if: 
  · You have new or worse belly pain.  
  · You have new or worse nausea or vomiting.  
  · You have blood in your stools.  Watch closely for changes in your health, and be sure to contact your doctor if: 
  · Your constipation is getting worse.  
  · You do not get better as expected. Where can you learn more? Go to http://paola-shane.info/. Enter 21 [phone] in the search box to learn more about \"Constipation: Care Instructions. \" Current as of: November 20, 2017 Content Version: 11.7 © 9785-3748 [employer]. Care instructions adapted under license by [employer] (which disclaims liability or warranty for this information). If you have questions about a medical condition or this instruction, always ask your healthcare professional. [address] any warranty or liability for your use of this information. Lubiprostone (By mouth) Lubiprostone (cbg-qd-BWTUO-one) Treats constipation. Also treats irritable bowel syndrome (IBS) with constipation in women. This medicine is a stool softener. Brand Name(s): Amitiza There may be other brand names for this medicine. When This Medicine Should Not Be Used: This medicine is not right for everyone. Do not use it if you had an allergic reaction to lubiprostone, or if you have stomach or bowel blockage. How to Use This Medicine:  
Capsule, Liquid Filled Capsule · Your doctor will tell you how much medicine to use. Do not use more than directed. · Take this medicine with food and water. This will help lessen the chance of nausea. · Swallow the capsule whole. Do not crush, break, or chew it. · Missed dose: Take a dose as soon as you remember. If it is almost time for your next dose, wait until then and take a regular dose. Do not take extra medicine to make up for a missed dose. · Store the medicine in a closed container at room temperature, away from heat, moisture, and direct light. Drugs and Foods to Avoid: Ask your doctor or pharmacist before using any other medicine, including over-the-counter medicines, vitamins, and herbal products. · Some medicines can affect how lubiprostone works. Tell your doctor if you are using methadone or a blood pressure medicine. Warnings While Using This Medicine: · Tell your doctor if you are pregnant or breastfeeding, or if you have liver disease, low blood pressure, severe diarrhea, or a sudden change in bowel movements. · This medicine may make you feel dizzy or lightheaded. Stand or sit up slowly if you are dizzy. This is more likely to happen when you begin to use the medicine or if you become dehydrated. · Your doctor will check your progress and the effects of this medicine at regular visits. Keep all appointments. · Keep all medicine out of the reach of children. Never share your medicine with anyone. Possible Side Effects While Using This Medicine:  
Call your doctor right away if you notice any of these side effects: · Allergic reaction: Itching or hives, swelling in your face or hands, swelling or tingling in your mouth or throat, chest tightness, trouble breathing · Lightheadedness, dizziness, fainting · Severe diarrhea, stomach pain or swelling · Severe nausea or vomiting · Trouble breathing If you notice these less serious side effects, talk with your doctor:  
· Headache · Mild nausea If you notice other side effects that you think are caused by this medicine, tell your doctor. Call your doctor for medical advice about side effects. You may report side effects to FDA at 8-775-FDA-1275 © 2017 [address] Information is for End User's use only and may not be sold, redistributed or otherwise used for commercial purposes. The above information is an  only. It is not intended as medical advice for individual conditions or treatments. Talk to your doctor, nurse or pharmacist before following any medical regimen to see if it is safe and effective for you. Introducing \[facility] [place] [facility]\"" & HEALTH SERVICES! [facility] introduces [employer] patient portal. Now you can access parts of your medical record, email your doctor's office, and request medication refills online. 1. In your internet browser, go to [email or web address] 2. Click on the First Time User? Click Here link in the Sign In box. You will see the New Member Sign Up page. 3. Enter your [employer] Access Code exactly as it appears below. You will not need to use this code after youve completed the sign-up process. If you do not sign up before the expiration date, you must request a new code. · [employer] Access Code: [email or web address] Expires: 10/29/2018 12:47 PM 
 
4. Enter the last four digits of your Social Security Number (xxxx) and Date of Birth (mm/dd/yyyy) as indicated and click Submit. You will be taken to the next sign-up page. 5. Create a [employer] ID. This will be your [employer] login ID and cannot be changed, so think of one that is secure and easy to remember. 6. Create a [employer] password. You can change your password at any time. 7. Enter your Password Reset Question and Answer. This can be used at a later time if you forget your password. 8. Enter your e-mail address. You will receive e-mail notification when new information is available in [address] 9. Click Sign Up. You can now view and download portions of your medical record. 10. Click the Download Summary menu link to download a portable copy of your medical information. If you have questions, please visit the Frequently Asked Questions section of the Beach[employer] website. Remember, BeachM[employer] is NOT to be used for urgent needs. For medical emergencies, dial 911. Now available from your iPhone and Android! Please provide this summary of care documentation to your next provider. Your primary care clinician is listed as CATHRYN Packer. If you have any questions after today's visit, please call [phone].

## 2018-07-31 NOTE — PROGRESS NOTES
Maria Chen is a 80 y.o. female and presents with Hypertension (f/u) and Knee Pain (f/u)    Subjective:  Pt here with chronic right knee pain, mostly stiff. Moving more now. Pain currently, 0 - No pain/10. Medication last taken months ago, having less now with increased activity. Has not received meds from another provider. No change in pain presentation since last OV. Constipation Review:  Here today to report constipation for the last several days. Onset 3 days ago, unchanged. Denies with bloating, abdominal pain, flatulence, and nausea. Denies rectal bleeding, laxative overuse, and decreased appetite. LBM: 3 days ago, firm and small. Has docusate stool softeners and lactulose, but unsure if taken regularly to prevent accidents. Currently does take opiates, but very infrequently. Dyslipidemia Review:  Patient presents for evaluation of lipids. Compliance with treatment thus far has been good. Denies myalgias, slurring speech, unilateral weakness, and chest pain. A repeat non-fasting lipid profile was done/ordered. The patient does use medications that may worsen dyslipidemias (corticosteroids, progestins, anabolic steroids, diuretics, beta-blockers, amiodarone, cyclosporine, olanzapine). The patient does not exercise regularly. The patient is known to have coexisting coronary artery disease; CVA. Taking Aspirin daily as prescribed/advised    Hypertension Review:  The patient has hypertension  Diet and Lifestyle: generally does try to follow a  low sodium diet, exercises rarely  Home BP Monitoring: is not measured at home. Pertinent ROS: taking medications as instructed, no medication side effects noted. No TIA's, chest pain on exertion, dyspnea on exertion, or swelling of ankles. BP Readings from Last 3 Encounters:   07/31/18 112/54   01/31/18 142/64   10/20/17 120/75     Constipation Review:  Here today to with chronic constipation. Onset years ago, unchanged. Associated with nothing.  Denies bloating, abdominal pain, flatulence, nausea, rectal bleeding, laxative overuse, and decreased appetite. LBM: yesterday, normal. Takes docusate once daily and lactulose twice weekly. Currently does take opiates twice weekly.     Review of Systems  Constitutional: negative for fevers, chills, anorexia and weight loss  Respiratory:  negative for cough, hemoptysis, dyspnea, and wheezing  CV:   negative for chest pain, palpitations, and lower extremity edema  GI:   negative for nausea, vomiting, diarrhea, abdominal pain, and melena  Endo:               negative for polyuria,polydipsia,polyphagia, and heat intolerance  Genitourinary: negative for frequency, urgency, dysuria, retention, and hematuria  Integument:  negative for rash, ulcerations  Hematologic:  negative for easy bruising and bleeding  Musculoskel: negative for muscle weakness  Neurological:  negative for headaches, dizziness, vertigo,and memory problems  Behavl/Psych: negative for feelings of anxiety, depression, suicide, and mood changes    Past Medical History:   Diagnosis Date    Cancer ([facility] [address]     right breast.    Chronic pain     CVA     HTN (hypertension) 2/9/2011    Hypertension     Microscopic hematuria 5/11/2011    Other and unspecified hyperlipidemia 2/9/2011    Subclinical hyperthyroidism 8/29/2015    Venous thrombosis 2/17/2010    left arm DVT     Past Surgical History:   Procedure Laterality Date    BREAST SURGERY PROCEDURE UNLISTED      HX HEENT      HX HEMORRHOIDECTOMY       Social History     Social History    Marital status:      Spouse name: N/A    Number of children: N/A    Years of education: N/A     Social History Main Topics    Smoking status: Former Smoker    Smokeless tobacco: Never Used      Comment: smoked for 10 yrs    Alcohol use 0.5 oz/week     1 Cans of beer per week      Comment: occ    Drug use: No    Sexual activity: No     Other Topics Concern    None     Social History Narrative     Family History Problem Relation Age of Onset    Stroke Mother     Stroke Father     Diabetes Daughter      Current Outpatient Prescriptions   Medication Sig Dispense Refill    traMADol (ULTRAM) 50 mg tablet Take 1 Tab by mouth daily as needed for Pain. Indications: Pain 30 Tab 2    metoprolol succinate (TOPROL-XL) 50 mg XL tablet Take 1 Tab by mouth daily. 90 Tab 1    lisinopril (PRINIVIL, ZESTRIL) 20 mg tablet Take 1 Tab by mouth daily. 90 Tab 3    simvastatin (ZOCOR) 20 mg tablet Take 1 Tab by mouth nightly. 30 Tab 11    methIMAzole (TAPAZOLE) 5 mg tablet Take 1 Tab by mouth every Monday, Wednesday, Friday. 30 Tab 11    lactulose (CHRONULAC) 10 gram/15 mL solution Take 15 mL by mouth two (2) times daily as needed. For severe CONSTIPATION  Indications: constipation 480 mL 1    docusate sodium (COLACE) 100 mg capsule Take 1 Cap by mouth two (2) times a day. 60 Cap 11    amLODIPine (NORVASC) 5 mg tablet Take 1 Tab by mouth daily. 30 Tab 11    ergocalciferol (ERGOCALCIFEROL) 50,000 unit capsule Take 1 Cap by mouth every seven (7) days. 12 Cap 4    polyethylene glycol (MIRALAX) 17 gram packet Take 1 Packet by mouth two (2) times daily as needed. 60 Packet 11    mometasone (ELOCON) 0.1 % topical cream Apply  to affected area daily. 15 g 1    ranitidine (ZANTAC) 150 mg tablet Take 1 Tab by mouth two (2) times daily as needed for Indigestion. Take for acid reflux 60 Tab 11    gabapentin (NEURONTIN) 100 mg capsule TAKE 1 CAP BY MOUTH NIGHTLY AS NEEDED. 20 Cap 1    hydrocortisone (ANUSOL-HC) 2.5 % rectal cream Insert  into rectum four (4) times daily. 45 g 5    aspirin delayed-release 81 mg tablet Take 1 Tab by mouth daily. 90 Tab 3    diclofenac (VOLTAREN) 1 % gel Apply 2 g to affected area every six (6) hours.  100 g 2    anastrozole (ARIMIDEX) 1 mg tablet TAKE 1 TABLET BY MOUTH DAILY  3     No Known Allergies    Objective:  Visit Vitals    /54 (BP 1 Location: Left arm, BP Patient Position: Sitting)    Pulse 65    Temp 97.6 °F (36.4 °C) (Oral)    Resp 20    Ht 5' (1.524 m)    Wt 130 lb (59 kg)    SpO2 100%    BMI 25.39 kg/m2     Wt Readings from Last 3 Encounters:   07/31/18 130 lb (59 kg)   02/16/18 130 lb (59 kg)   01/31/18 130 lb (59 kg)     Physical Exam:    General appearance - alert, well appearing, and in no distress. Seated comfortably in W/C. Mental status - A/O x 4, normal mood and affect. Neck -Supple ,normal CSP. FROM, non-tender. No significant adenopathy/thyromegaly. No JVD. Chest - CTA. Symmetric chest rise. No wheezing, rales or rhonchi. Heart - Normal rate, regular rhythm. Normal S1, S2. No MGR or clicks. Abdomen - Soft,non-distended. Hypoactive BS in all quadrants. NT, no mass or HSM. No CVAT. Ext- Radial, DP pulses, 2+ bilaterally. No pedal edema, clubbing, or cyanosis. Skin-Warm and dry. No clubbing or cyanosis. Hyperpigmentation to multiple nevi and skin tags. Neuro - Normal speech, no focal findings or movement disorder. Normal strength, gait, and muscle tone. Knees- LROM to full extension. NT. No crepitus. Assessment/Plan:  Chronic pain-Refilled tramadol. UDS ordered.  was reviewed while planning for pain management, no indications of drug diversion suspected. Prescription history is not suspicious for controlled substance overuse. Amitiza started, cocusate STOPPED. See below for other orders   Follow-up Disposition: Not on File      ICD-10-CM ICD-9-CM    1. HTN (hypertension), benign I22 096.1 METABOLIC PANEL, COMPREHENSIVE      CBC W/O DIFF   2. Mixed hyperlipidemia E78.2 272.2 LIPID PANEL   3. Bilateral chronic knee pain M25.561 719.46 traMADol (ULTRAM) 50 mg tablet    M25.562 338.29 DRUG SCREEN 11 W/CONF, SERUM    G89.29     4. Pain medication agreement signed Z02.89 V58.69 traMADol (ULTRAM) 50 mg tablet      DRUG SCREEN 11 W/CONF, SERUM   5.  Chronic, continuous use of opioids F11.90 305.51 traMADol (ULTRAM) 50 mg tablet      DRUG SCREEN 11 W/CONF, SERUM     Orders Placed This Encounter    METABOLIC PANEL, COMPREHENSIVE    CBC W/O DIFF    LIPID PANEL    DRUG SCREEN 11 W/CONF, SERUM    traMADol (ULTRAM) 50 mg tablet     Sig: Take 1 Tab by mouth daily as needed for Pain. Indications: Pain     Dispense:  30 Tab     Refill:  2       Alfred Doll expressed understanding of plan. An After Visit Summary was offered/printed and given to the patient. The patient's abnormal BMI was reviewed and deemed target BMI for the patient. An After Visit Summary was provided to the patient and/or caregiver.

## 2018-08-06 LAB — DRUGS UR: NORMAL

## 2018-08-10 LAB
ALBUMIN SERPL-MCNC: 4.2 G/DL (ref 3.5–4.7)
ALBUMIN/GLOB SERPL: 1.2 {RATIO} (ref 1.2–2.2)
ALP SERPL-CCNC: 75 IU/L (ref 39–117)
ALT SERPL-CCNC: 5 IU/L (ref 0–32)
AST SERPL-CCNC: 19 IU/L (ref 0–40)
BILIRUB SERPL-MCNC: 0.5 MG/DL (ref 0–1.2)
BUN SERPL-MCNC: 16 MG/DL (ref 8–27)
BUN/CREAT SERPL: 23 (ref 12–28)
CALCIUM SERPL-MCNC: 9.4 MG/DL (ref 8.7–10.3)
CHLORIDE SERPL-SCNC: 104 MMOL/L (ref 96–106)
CHOLEST SERPL-MCNC: 139 MG/DL (ref 100–199)
CO2 SERPL-SCNC: 22 MMOL/L (ref 20–29)
CREAT SERPL-MCNC: 0.71 MG/DL (ref 0.57–1)
ERYTHROCYTE [DISTWIDTH] IN BLOOD BY AUTOMATED COUNT: 15.1 % (ref 12.3–15.4)
GLOBULIN SER CALC-MCNC: 3.4 G/DL (ref 1.5–4.5)
GLUCOSE SERPL-MCNC: 89 MG/DL (ref 65–99)
HCT VFR BLD AUTO: 34.3 % (ref 34–46.6)
HDLC SERPL-MCNC: 55 MG/DL
HGB BLD-MCNC: 11.1 G/DL (ref 11.1–15.9)
INTERPRETATION, 910389: NORMAL
LDLC SERPL CALC-MCNC: 74 MG/DL (ref 0–99)
MCH RBC QN AUTO: 32.6 PG (ref 26.6–33)
MCHC RBC AUTO-ENTMCNC: 32.4 G/DL (ref 31.5–35.7)
MCV RBC AUTO: 101 FL (ref 79–97)
PLATELET # BLD AUTO: 236 X10E3/UL (ref 150–379)
POTASSIUM SERPL-SCNC: 4.6 MMOL/L (ref 3.5–5.2)
PROT SERPL-MCNC: 7.6 G/DL (ref 6–8.5)
RBC # BLD AUTO: 3.41 X10E6/UL (ref 3.77–5.28)
SODIUM SERPL-SCNC: 143 MMOL/L (ref 134–144)
TRIGL SERPL-MCNC: 48 MG/DL (ref 0–149)
VLDLC SERPL CALC-MCNC: 10 MG/DL (ref 5–40)
WBC # BLD AUTO: 5.7 X10E3/UL (ref 3.4–10.8)

## 2018-08-14 ENCOUNTER — TELEPHONE (OUTPATIENT)
Dept: INTERNAL MEDICINE CLINIC | Age: 83
End: 2018-08-14

## 2018-09-21 DIAGNOSIS — E78.2 MIXED HYPERLIPIDEMIA: ICD-10-CM

## 2018-09-24 RX ORDER — SIMVASTATIN 20 MG/1
20 TABLET, FILM COATED ORAL
Qty: 90 TAB | Refills: 3 | Status: SHIPPED | OUTPATIENT
Start: 2018-09-24 | End: 2019-12-18 | Stop reason: SDUPTHER

## 2018-12-04 ENCOUNTER — OFFICE VISIT (OUTPATIENT)
Dept: INTERNAL MEDICINE CLINIC | Age: 83
End: 2018-12-04

## 2018-12-04 ENCOUNTER — TELEPHONE (OUTPATIENT)
Dept: INTERNAL MEDICINE CLINIC | Age: 83
End: 2018-12-04

## 2018-12-04 VITALS
RESPIRATION RATE: 16 BRPM | DIASTOLIC BLOOD PRESSURE: 68 MMHG | TEMPERATURE: 98.5 F | BODY MASS INDEX: 25.91 KG/M2 | OXYGEN SATURATION: 94 % | SYSTOLIC BLOOD PRESSURE: 143 MMHG | HEIGHT: 60 IN | WEIGHT: 132 LBS | HEART RATE: 71 BPM

## 2018-12-04 DIAGNOSIS — G89.29 BILATERAL CHRONIC KNEE PAIN: ICD-10-CM

## 2018-12-04 DIAGNOSIS — F11.90 CHRONIC, CONTINUOUS USE OF OPIOIDS: ICD-10-CM

## 2018-12-04 DIAGNOSIS — M25.561 BILATERAL CHRONIC KNEE PAIN: ICD-10-CM

## 2018-12-04 DIAGNOSIS — M25.562 BILATERAL CHRONIC KNEE PAIN: ICD-10-CM

## 2018-12-04 DIAGNOSIS — K59.04 CHRONIC IDIOPATHIC CONSTIPATION: Primary | ICD-10-CM

## 2018-12-04 DIAGNOSIS — I10 HTN (HYPERTENSION), BENIGN: ICD-10-CM

## 2018-12-04 RX ORDER — NALOXONE HYDROCHLORIDE 4 MG/.1ML
SPRAY NASAL
Qty: 2 EACH | Refills: 0 | Status: SHIPPED | OUTPATIENT
Start: 2018-12-04 | End: 2020-07-28 | Stop reason: ALTCHOICE

## 2018-12-04 RX ORDER — TRAMADOL HYDROCHLORIDE 50 MG/1
50 TABLET ORAL
Qty: 30 TAB | Refills: 2 | Status: SHIPPED | OUTPATIENT
Start: 2018-12-04 | End: 2019-04-17 | Stop reason: SDUPTHER

## 2018-12-04 NOTE — PROGRESS NOTES
Shashi Herrera is a 80 y.o. female and presents with Follow-up (for constipation and knee pain ) and Fall (pt daughter states pt had a fall 2 weeks ago )    Subjective:  Constipation Review:  Here today to with chronic constipation. Onset years ago, unchanged. Associated with nothing. Denies bloating, abdominal pain, flatulence, nausea, rectal bleeding, laxative overuse, and decreased appetite. LBM: today and 4 days before that, PATIENTS [facility] # 4. Takes docusate once daily and amitiza cap once daily. Currently does take opiates twice weekly. Pt here with chronic right knee pain, mostly stiff. Had fall 2 weeks ago, hitting right knee. No head injury or LOC reported. Pain currently, 0 - No pain/10. Medication last taken months ago, having less now with increased activity. Has not received meds from another provider. No change in pain presentation since last OV. Review of Systems  Constitutional: negative for fevers, chills, anorexia and weight loss  Respiratory:  negative for cough, hemoptysis, dyspnea, and wheezing  CV:   +HTN.  negative for chest pain, palpitations, and lower extremity edema  GI:   negative for nausea, vomiting, diarrhea, abdominal pain, and melena  Endo:               negative for polyuria,polydipsia,polyphagia, and heat intolerance  Genitourinary: negative for frequency, urgency, dysuria, retention, and hematuria  Integument:  negative for rash, ulcerations  Hematologic:  negative for easy bruising and bleeding  Musculoskel: negative for muscle weakness  Neurological:  negative for headaches, dizziness, vertigo,and memory problems  Behavl/Psych: negative for feelings of anxiety, depression, suicide, and mood changes    Past Medical History:   Diagnosis Date    Cancer ([facility] [address]     right breast.    Chronic pain     CVA     HTN (hypertension) 2/9/2011    Hypertension     Microscopic hematuria 5/11/2011    Other and unspecified hyperlipidemia 2/9/2011    Subclinical hyperthyroidism 8/29/2015  Venous thrombosis 2/17/2010    left arm DVT     Past Surgical History:   Procedure Laterality Date    BREAST SURGERY PROCEDURE UNLISTED      HX HEENT      HX HEMORRHOIDECTOMY       Social History     Socioeconomic History    Marital status:      Spouse name: Not on file    Number of children: Not on file    Years of education: Not on file    Highest education level: Not on file   Tobacco Use    Smoking status: Former Smoker    Smokeless tobacco: Never Used    Tobacco comment: smoked for 10 yrs   Substance and Sexual Activity    Alcohol use: Yes     Alcohol/week: 0.5 oz     Types: 1 Cans of beer per week     Comment: occ    Drug use: No    Sexual activity: No     Family History   Problem Relation Age of Onset    Stroke Mother     Stroke Father     Diabetes Daughter      Current Outpatient Medications   Medication Sig Dispense Refill    traMADol (ULTRAM) 50 mg tablet Take 1 Tab by mouth daily as needed for Pain. 30 Tab 2    naloxone (NARCAN) 4 mg/actuation nasal spray Use 1 spray into 1 nostril for OVERDOSE. Call 911. For subsequent doses, give in alternating nostrils. May repeat every 2 to 3 min. 2 Each 0    methIMAzole (TAPAZOLE) 5 mg tablet Take 1 Tab by mouth every Monday, Wednesday, Friday. 30 Tab 11    simvastatin (ZOCOR) 20 mg tablet Take 1 Tab by mouth nightly. 90 Tab 3    lubiPROStone (AMITIZA) 24 mcg capsule Take 1 Cap by mouth two (2) times daily (with meals). Indications: chronic idiopathic constipation 60 Cap 5    metoprolol succinate (TOPROL-XL) 50 mg XL tablet Take 1 Tab by mouth daily. 90 Tab 1    lisinopril (PRINIVIL, ZESTRIL) 20 mg tablet Take 1 Tab by mouth daily. 90 Tab 3    diclofenac (VOLTAREN) 1 % gel Apply 2 g to affected area every six (6) hours. 100 g 2    docusate sodium (COLACE) 100 mg capsule Take 1 Cap by mouth two (2) times a day. 60 Cap 11    amLODIPine (NORVASC) 5 mg tablet Take 1 Tab by mouth daily.  30 Tab 11    anastrozole (ARIMIDEX) 1 mg tablet TAKE 1 TABLET BY MOUTH DAILY  3    ergocalciferol (ERGOCALCIFEROL) 50,000 unit capsule Take 1 Cap by mouth every seven (7) days. 12 Cap 4    mometasone (ELOCON) 0.1 % topical cream Apply  to affected area daily. 15 g 1    ranitidine (ZANTAC) 150 mg tablet Take 1 Tab by mouth two (2) times daily as needed for Indigestion. Take for acid reflux 60 Tab 11    gabapentin (NEURONTIN) 100 mg capsule TAKE 1 CAP BY MOUTH NIGHTLY AS NEEDED. 20 Cap 1    hydrocortisone (ANUSOL-HC) 2.5 % rectal cream Insert  into rectum four (4) times daily. 45 g 5    aspirin delayed-release 81 mg tablet Take 1 Tab by mouth daily. 90 Tab 3     No Known Allergies    Objective:  Visit Vitals  /68 (BP 1 Location: Right arm, BP Patient Position: Sitting)   Pulse 71   Temp 98.5 °F (36.9 °C) (Oral)   Resp 16   Ht 5' (1.524 m)   Wt 132 lb (59.9 kg)   SpO2 94%   BMI 25.78 kg/m²     Wt Readings from Last 3 Encounters:   12/04/18 132 lb (59.9 kg)   08/15/18 130 lb (59 kg)   07/31/18 130 lb (59 kg)     Physical Exam:    General appearance - alert, well appearing, and in no distress. Seated comfortably in W/C. Mental status - A/O x 4, normal mood and affect. Neck -Supple ,normal CSP. FROM, non-tender. No significant adenopathy/thyromegaly. No JVD. Chest - CTA. Symmetric chest rise. No wheezing, rales or rhonchi. Heart - Normal rate, regular rhythm. Normal S1, S2. No MGR or clicks. Ext- Radial, DP pulses, 2+ bilaterally. No pedal edema, clubbing, or cyanosis. Skin-Warm and dry. No clubbing or cyanosis. Hyperpigmentation to multiple nevi and skin tags. Neuro - Normal speech, no focal findings or movement disorder. Normal strength, gait, and muscle tone. Knees- LROM to full extension. NT. No crepitus. Assessment/Plan:  Chronic pain-Refilled tramadol.  was reviewed while planning for pain management, no indications of drug diversion suspected. Prescription history is not suspicious for controlled substance overuse.  Amitiza and docusate continued. No more lactulose. See below for other orders   Follow-up Disposition:  Return in about 3 months (around 3/4/2019) for TSH, HTN, CIC, KNEE PAIN. ICD-10-CM ICD-9-CM    1. Chronic idiopathic constipation K59.04 564.00    2. Bilateral chronic knee pain M25.561 719.46 traMADol (ULTRAM) 50 mg tablet    M25.562 338.29 naloxone (NARCAN) 4 mg/actuation nasal spray    G89.29     3. Chronic, continuous use of opioids F11.90 305.51 traMADol (ULTRAM) 50 mg tablet      naloxone (NARCAN) 4 mg/actuation nasal spray   4. HTN (hypertension), benign I10 401.1      Orders Placed This Encounter    traMADol (ULTRAM) 50 mg tablet     Sig: Take 1 Tab by mouth daily as needed for Pain. Dispense:  30 Tab     Refill:  2    naloxone (NARCAN) 4 mg/actuation nasal spray     Sig: Use 1 spray into 1 nostril for OVERDOSE. Call 911. For subsequent doses, give in alternating nostrils. May repeat every 2 to 3 min. Dispense:  2 Each     Refill:  0       Alfred Doll expressed understanding of plan. An After Visit Summary was offered/printed and given to the patient. The patient's abnormal BMI was reviewed and deemed target BMI for the patient. An After Visit Summary was provided to the patient and/or caregiver.

## 2018-12-04 NOTE — PATIENT INSTRUCTIONS
Learning About Naloxone for Opioid Overdose  What is naloxone? Opioids are strong pain medicines. Examples include hydrocodone, oxycodone, fentanyl, and morphine. Heroin is an example of an illegal opioid. Taking too much of an opioid can cause death. An overdose is an emergency. Naloxone is a medicine that reverses the effects of an overdose. If you take it soon enough after an overdose, it can save your life. Naloxone comes in a rescue kit you can carry with you. You may hear it called a Narcan kit for short. The rescue kit may contain:  · The medicine. · Syringes and needles. · A nasal adapter for the syringes. · A separate nasal spray device. Your doctor can give you a prescription for a rescue kit and show you how to use it. In some places you can buy Narcan kits without a prescription. When is naloxone used? Naloxone is used when a person shows signs of an opioid overdose. A person may have overdosed if he or she is:  · Sleepy or hard to wake up. · Confused. · Not breathing normally. Make sure your family and friends know about these signs of an overdose. If someone appears to have overdosed, call 911. A drug overdose is an emergency. How do you use it? If you overdose, you may not be able to give yourself the medicine. So it's very important that your friends and family know how and when to give it to you. Rescue kits come with instructions. There are two ways to give the medicine. Many rescue kits can be used for either method. The methods are:  · Injection with needle and syringe. ? Training may be needed in order to use this method correctly. ? Some rescue kits come with automatic injectors that don't require special training. ? The medicine can be injected through clothing. · Nasal spray. ? Many rescue kits come with a nasal adapter. It attaches to the syringe and turns the medicine into a mist.  ? The mist is sprayed into the nose of a person who has overdosed.  The person needs to be lying down when the mist is sprayed. Overdose symptoms may return a few minutes after the first dose from the rescue kit. If that happens, a second dose is needed. Rescue kits come with two doses for that reason. Keep your rescue kit with you always. You never know when you might need it. If you think you or someone else may have overdosed but you're not sure, use the kit anyway. Aside from going through withdrawal, which may be uncomfortable, there is no downside to using this medicine. Always go to the emergency room after using naloxone. Doctors will want to make sure the overdose has been reversed. Follow-up care is a key part of your treatment and safety. Be sure to make and go to all appointments, and call your doctor if you are having problems. It's also a good idea to know your test results and keep a list of the medicines you take. Where can you learn more? Go to http://paola-shane.info/. Enter Y833 in the search box to learn more about \"Learning About Naloxone for Opioid Overdose. \"  Current as of: May 8, 2018  Content Version: 11.8  © 0170-0564 [employer]. Care instructions adapted under license by [employer] (which disclaims liability or warranty for this information). If you have questions about a medical condition or this instruction, always ask your healthcare professional. [address] any warranty or liability for your use of this information.

## 2018-12-04 NOTE — TELEPHONE ENCOUNTER
Patient's daughter Fidel Ramirez called because she wanted to let Marck Salazar know that the patient had fallen about two weeks ago. At the time she was feeling ok. The contact number is [phone].

## 2019-01-18 DIAGNOSIS — I82.90 VENOUS THROMBOSIS: ICD-10-CM

## 2019-01-18 DIAGNOSIS — M25.562 BILATERAL CHRONIC KNEE PAIN: Primary | ICD-10-CM

## 2019-01-18 DIAGNOSIS — M25.561 BILATERAL CHRONIC KNEE PAIN: Primary | ICD-10-CM

## 2019-01-18 DIAGNOSIS — G89.29 BILATERAL CHRONIC KNEE PAIN: Primary | ICD-10-CM

## 2019-01-18 DIAGNOSIS — N32.81 OVERACTIVE BLADDER: ICD-10-CM

## 2019-02-15 DIAGNOSIS — M25.561 BILATERAL CHRONIC KNEE PAIN: ICD-10-CM

## 2019-02-15 DIAGNOSIS — G89.29 BILATERAL CHRONIC KNEE PAIN: ICD-10-CM

## 2019-02-15 DIAGNOSIS — F11.90 CHRONIC, CONTINUOUS USE OF OPIOIDS: ICD-10-CM

## 2019-02-15 DIAGNOSIS — M25.562 BILATERAL CHRONIC KNEE PAIN: ICD-10-CM

## 2019-02-15 RX ORDER — METOPROLOL SUCCINATE 50 MG/1
50 TABLET, EXTENDED RELEASE ORAL DAILY
Qty: 90 TAB | Refills: 3 | Status: SHIPPED | OUTPATIENT
Start: 2019-02-15 | End: 2020-05-09

## 2019-02-15 RX ORDER — TRAMADOL HYDROCHLORIDE 50 MG/1
50 TABLET ORAL
Qty: 30 TAB | Refills: 2 | OUTPATIENT
Start: 2019-02-15

## 2019-04-17 ENCOUNTER — OFFICE VISIT (OUTPATIENT)
Dept: INTERNAL MEDICINE CLINIC | Age: 84
End: 2019-04-17

## 2019-04-17 VITALS
HEART RATE: 67 BPM | SYSTOLIC BLOOD PRESSURE: 181 MMHG | RESPIRATION RATE: 17 BRPM | OXYGEN SATURATION: 99 % | TEMPERATURE: 98.6 F | BODY MASS INDEX: 25.52 KG/M2 | HEIGHT: 60 IN | DIASTOLIC BLOOD PRESSURE: 83 MMHG | WEIGHT: 130 LBS

## 2019-04-17 DIAGNOSIS — I10 HTN (HYPERTENSION), BENIGN: ICD-10-CM

## 2019-04-17 DIAGNOSIS — M25.562 BILATERAL CHRONIC KNEE PAIN: ICD-10-CM

## 2019-04-17 DIAGNOSIS — F11.90 CHRONIC, CONTINUOUS USE OF OPIOIDS: ICD-10-CM

## 2019-04-17 DIAGNOSIS — K59.04 CHRONIC IDIOPATHIC CONSTIPATION: Primary | ICD-10-CM

## 2019-04-17 DIAGNOSIS — M25.561 BILATERAL CHRONIC KNEE PAIN: ICD-10-CM

## 2019-04-17 DIAGNOSIS — G89.29 BILATERAL CHRONIC KNEE PAIN: ICD-10-CM

## 2019-04-17 RX ORDER — TRAMADOL HYDROCHLORIDE 50 MG/1
50 TABLET ORAL
Qty: 30 TAB | Refills: 2 | Status: SHIPPED | OUTPATIENT
Start: 2019-04-17 | End: 2019-05-17

## 2019-04-17 NOTE — PATIENT INSTRUCTIONS
DASH Diet: Care Instructions  Your Care Instructions    The DASH diet is an eating plan that can help lower your blood pressure. DASH stands for Dietary Approaches to Stop Hypertension. Hypertension is high blood pressure. The DASH diet focuses on eating foods that are high in calcium, potassium, and magnesium. These nutrients can lower blood pressure. The foods that are highest in these nutrients are fruits, vegetables, low-fat dairy products, nuts, seeds, and legumes. But taking calcium, potassium, and magnesium supplements instead of eating foods that are high in those nutrients does not have the same effect. The DASH diet also includes whole grains, fish, and poultry. The DASH diet is one of several lifestyle changes your doctor may recommend to lower your high blood pressure. Your doctor may also want you to decrease the amount of sodium in your diet. Lowering sodium while following the DASH diet can lower blood pressure even further than just the DASH diet alone. Follow-up care is a key part of your treatment and safety. Be sure to make and go to all appointments, and call your doctor if you are having problems. It's also a good idea to know your test results and keep a list of the medicines you take. How can you care for yourself at home? Following the DASH diet  · Eat 4 to 5 servings of fruit each day. A serving is 1 medium-sized piece of fruit, ½ cup chopped or canned fruit, 1/4 cup dried fruit, or 4 ounces (½ cup) of fruit juice. Choose fruit more often than fruit juice. · Eat 4 to 5 servings of vegetables each day. A serving is 1 cup of lettuce or raw leafy vegetables, ½ cup of chopped or cooked vegetables, or 4 ounces (½ cup) of vegetable juice. Choose vegetables more often than vegetable juice. · Get 2 to 3 servings of low-fat and fat-free dairy each day. A serving is 8 ounces of milk, 1 cup of yogurt, or 1 ½ ounces of cheese. · Eat 6 to 8 servings of grains each day.  A serving is 1 slice of bread, 1 ounce of dry cereal, or ½ cup of cooked rice, pasta, or cooked cereal. Try to choose whole-grain products as much as possible. · Limit lean meat, poultry, and fish to 2 servings each day. A serving is 3 ounces, about the size of a deck of cards. · Eat 4 to 5 servings of nuts, seeds, and legumes (cooked dried beans, lentils, and split peas) each week. A serving is 1/3 cup of nuts, 2 tablespoons of seeds, or ½ cup of cooked beans or peas. · Limit fats and oils to 2 to 3 servings each day. A serving is 1 teaspoon of vegetable oil or 2 tablespoons of salad dressing. · Limit sweets and added sugars to 5 servings or less a week. A serving is 1 tablespoon jelly or jam, ½ cup sorbet, or 1 cup of lemonade. · Eat less than 2,300 milligrams (mg) of sodium a day. If you limit your sodium to 1,500 mg a day, you can lower your blood pressure even more. Tips for success  · Start small. Do not try to make dramatic changes to your diet all at once. You might feel that you are missing out on your favorite foods and then be more likely to not follow the plan. Make small changes, and stick with them. Once those changes become habit, add a few more changes. · Try some of the following:  ? Make it a goal to eat a fruit or vegetable at every meal and at snacks. This will make it easy to get the recommended amount of fruits and vegetables each day. ? Try yogurt topped with fruit and nuts for a snack or healthy dessert. ? Add lettuce, tomato, cucumber, and onion to sandwiches. ? Combine a ready-made pizza crust with low-fat mozzarella cheese and lots of vegetable toppings. Try using tomatoes, squash, spinach, broccoli, carrots, cauliflower, and onions. ? Have a variety of cut-up vegetables with a low-fat dip as an appetizer instead of chips and dip. ? Sprinkle sunflower seeds or chopped almonds over salads. Or try adding chopped walnuts or almonds to cooked vegetables.   ? Try some vegetarian meals using beans and peas. Add garbanzo or kidney beans to salads. Make burritos and tacos with mashed mensah beans or black beans. Where can you learn more? Go to http://paola-shane.info/. Enter F623 in the search box to learn more about \"DASH Diet: Care Instructions. \"  Current as of: July 22, 2018  Content Version: 11.9  © 2858-1994 [employer]. Care instructions adapted under license by [employer] (which disclaims liability or warranty for this information). If you have questions about a medical condition or this instruction, always ask your healthcare professional. [address] any warranty or liability for your use of this information.

## 2019-04-17 NOTE — PROGRESS NOTES
Pt is here for   Chief Complaint   Patient presents with    Follow-up     TSH, HTN, CIC, Knee pain     Pt states pain level is a 3/10 knee. 1. Have you been to the ER, urgent care clinic since your last visit? Hospitalized since your last visit? No    2. Have you seen or consulted any other health care providers outside of the [address] [place] [address] since your last visit? Include any pap smears or colon screening.  No

## 2019-04-18 NOTE — PROGRESS NOTES
Óscar Plata is a 80 y.o. female and presents with Follow-up (TSH, HTN, CIC, Knee pain)    Subjective:  Pt here to f/u TSH, HTN, constipation, and knee pain. Taking meds, but taking docusate and amitiza. Feels better. No acute complaints otherwise. Denies side effects from medication. Hypertension Review:  The patient has hypertension  Diet and Lifestyle: generally does try to follow a  low sodium diet, exercises sporadically   Home BP Monitoring: is not measured at home. Pertinent ROS: taking medications as instructed, no medication side effects noted. No TIA's, chest pain on exertion, dyspnea on exertion, or swelling of ankles. BP Readings from Last 3 Encounters:   04/17/19 181/83   02/13/19 120/70   12/04/18 143/68     Pt here with chronic right knee pain, mostly stiff. No head injury or LOC reported. Pain currently, 0 - No pain/10. No change in pain presentation since last OV. Review of Systems  Constitutional: negative for fevers, chills, anorexia and weight loss  Respiratory:  negative for cough, hemoptysis, dyspnea, and wheezing  CV:   +HTN. negative for chest pain, palpitations, and lower extremity edema  GI:   negative for nausea, vomiting, diarrhea, abdominal pain, and melena  Endo:               + thyroid dysfunction, followed by Dr. Tatyana Crawford.  negative for polyuria,polydipsia,polyphagia, and heat intolerance  Genitourinary: negative for frequency, urgency, dysuria, retention, and hematuria  Integument:  negative for rash, ulcerations  Hematologic:  negative for easy bruising and bleeding  Musculoskel: negative for muscle weakness  Neurological:  negative for headaches, dizziness, vertigo,and memory problems  Behavl/Psych: negative for feelings of anxiety, depression, suicide, and mood changes    Past Medical History:   Diagnosis Date    Cancer ([facility] [address]     right breast.    Chronic pain     CVA     HTN (hypertension) 2/9/2011    Hypertension     Microscopic hematuria 5/11/2011    Other and unspecified hyperlipidemia 2/9/2011    Subclinical hyperthyroidism 8/29/2015    Venous thrombosis 2/17/2010    left arm DVT     Past Surgical History:   Procedure Laterality Date    BREAST SURGERY PROCEDURE UNLISTED      HX HEENT      HX HEMORRHOIDECTOMY       Social History     Socioeconomic History    Marital status:      Spouse name: Not on file    Number of children: Not on file    Years of education: Not on file    Highest education level: Not on file   Tobacco Use    Smoking status: Former Smoker    Smokeless tobacco: Never Used    Tobacco comment: smoked for 10 yrs   Substance and Sexual Activity    Alcohol use: Yes     Alcohol/week: 0.5 oz     Types: 1 Cans of beer per week     Comment: occ    Drug use: No    Sexual activity: Never     Family History   Problem Relation Age of Onset    Stroke Mother     Stroke Father     Diabetes Daughter      Current Outpatient Medications   Medication Sig Dispense Refill    traMADol (ULTRAM) 50 mg tablet Take 1 Tab by mouth daily as needed for Pain for up to 30 days. Indications: Pain 30 Tab 2    metoprolol succinate (TOPROL-XL) 50 mg XL tablet Take 1 Tab by mouth daily. 90 Tab 3    methIMAzole (TAPAZOLE) 5 mg tablet Take 1 Tab by mouth every Monday, Wednesday, Friday. 30 Tab 11    [facility]. 1 Each 0    simvastatin (ZOCOR) 20 mg tablet Take 1 Tab by mouth nightly. 90 Tab 3    lubiPROStone (AMITIZA) 24 mcg capsule Take 1 Cap by mouth two (2) times daily (with meals). Indications: chronic idiopathic constipation 60 Cap 5    lisinopril (PRINIVIL, ZESTRIL) 20 mg tablet Take 1 Tab by mouth daily. 90 Tab 3    docusate sodium (COLACE) 100 mg capsule Take 1 Cap by mouth two (2) times a day. 60 Cap 11    amLODIPine (NORVASC) 5 mg tablet Take 1 Tab by mouth daily.  30 Tab 11    anastrozole (ARIMIDEX) 1 mg tablet TAKE 1 TABLET BY MOUTH DAILY  3    ergocalciferol (ERGOCALCIFEROL) 50,000 unit capsule Take 1 Cap by mouth every seven (7) days. 12 Cap 4    mometasone (ELOCON) 0.1 % topical cream Apply  to affected area daily. 15 g 1    ranitidine (ZANTAC) 150 mg tablet Take 1 Tab by mouth two (2) times daily as needed for Indigestion. Take for acid reflux 60 Tab 11    gabapentin (NEURONTIN) 100 mg capsule TAKE 1 CAP BY MOUTH NIGHTLY AS NEEDED. 20 Cap 1    hydrocortisone (ANUSOL-HC) 2.5 % rectal cream Insert  into rectum four (4) times daily. 45 g 5    aspirin delayed-release 81 mg tablet Take 1 Tab by mouth daily. 90 Tab 3    trimethoprim-sulfamethoxazole (BACTRIM DS, SEPTRA DS) 160-800 mg per tablet Take 1 Tab by mouth two (2) times a day. 10 Tab 1    naloxone (NARCAN) 4 mg/actuation nasal spray Use 1 spray into 1 nostril for OVERDOSE. Call 911. For subsequent doses, give in alternating nostrils. May repeat every 2 to 3 min. 2 Each 0    diclofenac (VOLTAREN) 1 % gel Apply 2 g to affected area every six (6) hours. 100 g 2     No Known Allergies    Objective:  Visit Vitals  /83 (BP 1 Location: Right arm, BP Patient Position: Sitting)   Pulse 67   Temp 98.6 °F (37 °C) (Oral)   Resp 17   Ht 5' (1.524 m)   Wt 130 lb (59 kg)   SpO2 99%   BMI 25.39 kg/m²     Wt Readings from Last 3 Encounters:   04/17/19 130 lb (59 kg)   02/13/19 132 lb (59.9 kg)   12/04/18 132 lb (59.9 kg)     Physical Exam:    General appearance - alert, well appearing, and in no distress. Seated comfortably in W/C. Mental status - A/O x 4, normal mood and affect. Neck -Supple ,normal CSP. FROM, non-tender. No significant adenopathy/thyromegaly. No JVD. Chest - CTA. Symmetric chest rise. No wheezing, rales or rhonchi. Heart - Normal rate, regular rhythm. Normal S1, S2. No MGR or clicks. Ext- Radial, DP pulses, 2+ bilaterally. No pedal edema, clubbing, or cyanosis. Skin-Warm and dry. No clubbing or cyanosis. Hyperpigmentation to multiple nevi and skin tags. Neuro - Normal speech, no focal findings or movement disorder.  Normal strength, gait, and muscle tone. Knees- LROM to full extension. NT. No crepitus. Assessment/Plan:  Chronic pain-UDS. Refilled tramadol.  was reviewed while planning for pain management, no indications of drug diversion suspected. Prescription history is not suspicious for controlled substance overuse. The current medical regimen is effective;  continue present plan and medications. Medication Side Effects and Warnings were discussed with patient: yes   Patient Labs were reviewed: yes  Patient Past Records were reviewed: yes    See below for other orders   Follow-up and Dispositions    · Return in about 3 months (around 7/17/2019) for HTN, CIC, pain med refill. ICD-10-CM ICD-9-CM    1. Chronic idiopathic constipation K59.04 564.00    2. Bilateral chronic knee pain M25.561 719.46 traMADol (ULTRAM) 50 mg tablet    M25.562 338.29 TOXASSURE SELECT 13 (MW)    G89.29     3. Chronic, continuous use of opioids F11.90 305.51 traMADol (ULTRAM) 50 mg tablet      TOXASSURE SELECT 13 (MW)   4. HTN (hypertension), benign I10 401.1      Orders Placed This Encounter    TOXASSURE SELECT 13 (MW)    traMADol (ULTRAM) 50 mg tablet     Sig: Take 1 Tab by mouth daily as needed for Pain for up to 30 days. Indications: Pain     Dispense:  30 Tab     Refill:  2       Alfred Doll expressed understanding of plan. An After Visit Summary was offered/printed and given to the patient. The patient's abnormal BMI was reviewed and deemed target BMI for the patient. An After Visit Summary was provided to the patient and/or caregiver.

## 2019-05-07 ENCOUNTER — TELEPHONE (OUTPATIENT)
Dept: INTERNAL MEDICINE CLINIC | Age: 84
End: 2019-05-07

## 2019-05-07 NOTE — TELEPHONE ENCOUNTER
Jonah Bernstein @ [facility] called to let you know they do not have any hospital beds and you might want to get one from [facility] [address] @ [phone]

## 2019-06-05 ENCOUNTER — TELEPHONE (OUTPATIENT)
Dept: INTERNAL MEDICINE CLINIC | Age: 84
End: 2019-06-05

## 2019-06-05 NOTE — TELEPHONE ENCOUNTER
Pt's daughter Phi Bailey called stating her mother has a really dry cough  And she wants rx sent to the pharmacist. She also states the pharmacist recommended [facility] but she wanted to ask .  Pt # [phone]

## 2019-06-14 ENCOUNTER — APPOINTMENT (OUTPATIENT)
Dept: GENERAL RADIOLOGY | Age: 84
End: 2019-06-14
Attending: PHYSICIAN ASSISTANT
Payer: MEDICARE

## 2019-06-14 ENCOUNTER — HOSPITAL ENCOUNTER (EMERGENCY)
Age: 84
Discharge: HOME | End: 2019-06-14
Attending: EMERGENCY MEDICINE
Payer: MEDICARE

## 2019-06-14 VITALS
SYSTOLIC BLOOD PRESSURE: 156 MMHG | HEIGHT: 61 IN | TEMPERATURE: 98 F | WEIGHT: 130 LBS | DIASTOLIC BLOOD PRESSURE: 72 MMHG | RESPIRATION RATE: 20 BRPM | BODY MASS INDEX: 24.55 KG/M2 | HEART RATE: 65 BPM | OXYGEN SATURATION: 96 %

## 2019-06-14 DIAGNOSIS — J20.9 ACUTE BRONCHITIS, UNSPECIFIED ORGANISM: Primary | ICD-10-CM

## 2019-06-14 PROCEDURE — 99282 EMERGENCY DEPT VISIT SF MDM: CPT

## 2019-06-14 PROCEDURE — 71046 X-RAY EXAM CHEST 2 VIEWS: CPT

## 2019-06-14 RX ORDER — AZITHROMYCIN 250 MG/1
TABLET, FILM COATED ORAL
Qty: 6 TAB | Refills: 0 | Status: SHIPPED | OUTPATIENT
Start: 2019-06-14 | End: 2019-07-18 | Stop reason: ALTCHOICE

## 2019-06-14 RX ORDER — BENZONATATE 100 MG/1
100 CAPSULE ORAL
Qty: 15 CAP | Refills: 0 | Status: SHIPPED | OUTPATIENT
Start: 2019-06-14 | End: 2019-12-18 | Stop reason: SDUPTHER

## 2019-06-14 NOTE — DISCHARGE INSTRUCTIONS
Patient Education        Bronchitis: Care Instructions  Your Care Instructions    Bronchitis is inflammation of the bronchial tubes, which carry air to the lungs. The tubes swell and produce mucus, or phlegm. The mucus and inflamed bronchial tubes make you cough. You may have trouble breathing. Most cases of bronchitis are caused by viruses like those that cause colds. Antibiotics usually do not help and they may be harmful. Bronchitis usually develops rapidly and lasts about 2 to 3 weeks in otherwise healthy people. Follow-up care is a key part of your treatment and safety. Be sure to make and go to all appointments, and call your doctor if you are having problems. It's also a good idea to know your test results and keep a list of the medicines you take. How can you care for yourself at home? · Take all medicines exactly as prescribed. Call your doctor if you think you are having a problem with your medicine. · Get some extra rest.  · Take an over-the-counter pain medicine, such as acetaminophen (Tylenol), ibuprofen (Advil, Motrin), or naproxen (Aleve) to reduce fever and relieve body aches. Read and follow all instructions on the label. · Do not take two or more pain medicines at the same time unless the doctor told you to. Many pain medicines have acetaminophen, which is Tylenol. Too much acetaminophen (Tylenol) can be harmful. · Take an over-the-counter cough medicine that contains dextromethorphan to help quiet a dry, hacking cough so that you can sleep. Avoid cough medicines that have more than one active ingredient. Read and follow all instructions on the label. · Breathe moist air from a humidifier, hot shower, or sink filled with hot water. The heat and moisture will thin mucus so you can cough it out. · Do not smoke. Smoking can make bronchitis worse. If you need help quitting, talk to your doctor about stop-smoking programs and medicines.  These can increase your chances of quitting for good.  When should you call for help? Call 911 anytime you think you may need emergency care. For example, call if:    · You have severe trouble breathing.    Call your doctor now or seek immediate medical care if:    · You have new or worse trouble breathing.     · You cough up dark brown or bloody mucus (sputum).     · You have a new or higher fever.     · You have a new rash.    Watch closely for changes in your health, and be sure to contact your doctor if:    · You cough more deeply or more often, especially if you notice more mucus or a change in the color of your mucus.     · You are not getting better as expected. Where can you learn more? Go to http://paola-shane.info/. Enter H333 in the search box to learn more about \"Bronchitis: Care Instructions. \"  Current as of: September 5, 2018  Content Version: 11.9  © 4807-0673 [employer]. Care instructions adapted under license by [employer] (which disclaims liability or warranty for this information). If you have questions about a medical condition or this instruction, always ask your healthcare professional. [address] any warranty or liability for your use of this information.

## 2019-06-14 NOTE — ED NOTES
Patient presents to the ED with c/o coughing x1 week with tan mucus. Pt report taking cough syrup. Pt denies any runny nose, sore throat and fever or chills. Pt is alert and oriented. Pt skin is warm and dry. Pt is wheelchair bound. Emergency Department Nursing Plan of Care       The Nursing Plan of Care is developed from the Nursing assessment and Emergency Department Attending provider initial evaluation. The plan of care may be reviewed in the ED Provider note.     The Plan of Care was developed with the following considerations:   Patient / Family readiness to learn indicated by:verbalized understanding  Persons(s) to be included in education: patient  Barriers to Learning/Limitations:No    Signed     Ramsey Romano    6/14/2019   11:42 AM

## 2019-06-14 NOTE — ED PROVIDER NOTES
EMERGENCY DEPARTMENT HISTORY AND PHYSICAL EXAM    Date: 6/14/2019  Patient Name: Kris Jaeger    History of Presenting Illness     Chief Complaint   Patient presents with    Cold Symptoms         History Provided By: Patient    HPI: Kris Jaeger is a 80 y.o. female with a PMH of HTN, CVA, breast CA, DVT, hyperthyroidism who presents with cough x 1wk. Pt reports non productive cough, normal appetite, no runny nose, no fevers, no sore throat and no SOB. Per pt's daughter she had appt with PCP but elevators are broken so she was unable to see her and appt rescheduled for Monday. There are no alleviating or exacerbating factors. Pt has tried OTC cough meds with no relief. PCP: Parish Hopkins NP    Current Outpatient Medications   Medication Sig Dispense Refill    azithromycin (ZITHROMAX) 250 mg tablet Take two tablets today then one tablet daily 6 Tab 0    benzonatate (TESSALON PERLES) 100 mg capsule Take 1 Cap by mouth three (3) times daily as needed for Cough for up to 7 days. 15 Cap 0    metoprolol succinate (TOPROL-XL) 50 mg XL tablet Take 1 Tab by mouth daily. 90 Tab 3    methIMAzole (TAPAZOLE) 5 mg tablet Take 1 Tab by mouth every Monday, Wednesday, Friday. 30 Tab 11    miscellaneous medical supply [facility]. 1 Each 0    naloxone (NARCAN) 4 mg/actuation nasal spray Use 1 spray into 1 nostril for OVERDOSE. Call 911. For subsequent doses, give in alternating nostrils. May repeat every 2 to 3 min. 2 Each 0    simvastatin (ZOCOR) 20 mg tablet Take 1 Tab by mouth nightly. 90 Tab 3    lubiPROStone (AMITIZA) 24 mcg capsule Take 1 Cap by mouth two (2) times daily (with meals). Indications: chronic idiopathic constipation 60 Cap 5    lisinopril (PRINIVIL, ZESTRIL) 20 mg tablet Take 1 Tab by mouth daily. 90 Tab 3    diclofenac (VOLTAREN) 1 % gel Apply 2 g to affected area every six (6) hours. 100 g 2    docusate sodium (COLACE) 100 mg capsule Take 1 Cap by mouth two (2) times a day.  60 Cap 11    amLODIPine (NORVASC) 5 mg tablet Take 1 Tab by mouth daily. 30 Tab 11    anastrozole (ARIMIDEX) 1 mg tablet TAKE 1 TABLET BY MOUTH DAILY  3    ergocalciferol (ERGOCALCIFEROL) 50,000 unit capsule Take 1 Cap by mouth every seven (7) days. 12 Cap 4    mometasone (ELOCON) 0.1 % topical cream Apply  to affected area daily. 15 g 1    ranitidine (ZANTAC) 150 mg tablet Take 1 Tab by mouth two (2) times daily as needed for Indigestion. Take for acid reflux 60 Tab 11    gabapentin (NEURONTIN) 100 mg capsule TAKE 1 CAP BY MOUTH NIGHTLY AS NEEDED. 20 Cap 1    hydrocortisone (ANUSOL-HC) 2.5 % rectal cream Insert  into rectum four (4) times daily. 45 g 5    aspirin delayed-release 81 mg tablet Take 1 Tab by mouth daily. 80 Tab 3       Past History     Past Medical History:  Past Medical History:   Diagnosis Date    Cancer ([facility] [address]     right breast.    Chronic pain     CVA     HTN (hypertension) 2/9/2011    Hypertension     Microscopic hematuria 5/11/2011    Other and unspecified hyperlipidemia 2/9/2011    Subclinical hyperthyroidism 8/29/2015    Venous thrombosis 2/17/2010    left arm DVT       Past Surgical History:  Past Surgical History:   Procedure Laterality Date    BREAST SURGERY PROCEDURE UNLISTED      HX HEENT      HX HEMORRHOIDECTOMY         Family History:  Family History   Problem Relation Age of Onset    Stroke Mother    Osvaldo Miners Stroke Father     Diabetes Daughter        Social History:  Social History     Tobacco Use    Smoking status: Former Smoker    Smokeless tobacco: Never Used    Tobacco comment: smoked for 10 yrs   Substance Use Topics    Alcohol use: Yes     Alcohol/week: 0.5 oz     Types: 1 Cans of beer per week     Comment: occ    Drug use: No       Allergies:  No Known Allergies      Review of Systems   Review of Systems   Constitutional: Negative for appetite change, chills and fever. HENT: Negative for congestion, rhinorrhea and sore throat. Respiratory: Positive for cough. Negative for shortness of breath and stridor. Cardiovascular: Negative for chest pain. Gastrointestinal: Negative for nausea and vomiting. Neurological: Negative for speech difficulty and weakness. All other systems reviewed and are negative. Physical Exam     Vitals:    06/14/19 1123   BP: 156/72   Pulse: 65   Resp: 20   Temp: 98 °F (36.7 °C)   SpO2: 96%   Weight: 59 kg (130 lb)   Height: 5' 1\" (1.549 m)     Physical Exam   Constitutional: She is oriented to person, place, and time. She appears well-developed and well-nourished. No distress. HENT:   Head: Normocephalic and atraumatic. Right Ear: Tympanic membrane normal.   Left Ear: Tympanic membrane normal.   Mouth/Throat: Oropharynx is clear and moist. No oropharyngeal exudate. Eyes: Conjunctivae are normal.   Cardiovascular: Normal rate, regular rhythm and normal heart sounds. Pulmonary/Chest: Effort normal and breath sounds normal. No respiratory distress. She has no wheezes. She has no rales. Neurological: She is alert and oriented to person, place, and time. Skin: Skin is warm and dry. Psychiatric: She has a normal mood and affect. Her behavior is normal. Judgment and thought content normal.   Nursing note and vitals reviewed. at 2:46 PM      Diagnostic Study Results     Labs -   No results found for this or any previous visit (from the past 12 hour(s)). Radiologic Studies -   XR CHEST PA LAT   Final Result   IMPRESSION: No acute intrathoracic disease. CT Results  (Last 48 hours)    None        CXR Results  (Last 48 hours)               06/14/19 1203  XR CHEST PA LAT Final result    Impression:  IMPRESSION: No acute intrathoracic disease. Narrative:  CLINICAL HISTORY: Cough for one week    INDICATION: Cough       COMPARISON: 1/5/2012       FINDINGS:    PA and lateral views of the chest are obtained. The cardiopericardial silhouette is within normal limits.  There is no pleural   effusion, pneumothorax or focal consolidation present. Medical Decision Making   I am the first provider for this patient. I reviewed the vital signs, available nursing notes, past medical history, past surgical history, family history and social history. Vital Signs-Reviewed the patient's vital signs. Disposition:  Discharged    DISCHARGE NOTE:   8803 pm      Care plan outlined and precautions discussed. Patient has no new complaints, changes, or physical findings. Results of CXR were reviewed with the patient and daughter. All medications were reviewed with the patient; will d/c home. All of pt's questions and concerns were addressed. Patient was instructed and agrees to follow up with PCP, as well as to return to the ED upon further deterioration. Patient is ready to go home. Follow-up Information     Follow up With Specialties Details Why Contact Info    Laura Madera NP Nurse Practitioner Go on 6/17/2019 as scheduled [place]  [address]  [phone]            Discharge Medication List as of 6/14/2019 12:22 PM      START taking these medications    Details   azithromycin (ZITHROMAX) 250 mg tablet Take two tablets today then one tablet daily, Normal, Disp-6 Tab, R-0      benzonatate (TESSALON PERLES) 100 mg capsule Take 1 Cap by mouth three (3) times daily as needed for Cough for up to 7 days. , Normal, Disp-15 Cap, R-0         CONTINUE these medications which have NOT CHANGED    Details   metoprolol succinate (TOPROL-XL) 50 mg XL tablet Take 1 Tab by mouth daily. , Normal, Disp-90 Tab, R-3      methIMAzole (TAPAZOLE) 5 mg tablet Take 1 Tab by mouth every Monday, Wednesday, Friday., Normal, Disp-30 Tab, R-11      miscellaneous medical supply [facility] bed., Print, Disp-1 Each, R-0      naloxone (NARCAN) 4 mg/actuation nasal spray Use 1 spray into 1 nostril for OVERDOSE. Call 911. For subsequent doses, give in alternating nostrils.  May repeat every 2 to 3 min., Normal, Disp-2 Each, R-0      simvastatin (ZOCOR) 20 mg tablet Take 1 Tab by mouth nightly., Normal, Disp-90 Tab, R-3      lubiPROStone (AMITIZA) 24 mcg capsule Take 1 Cap by mouth two (2) times daily (with meals). Indications: chronic idiopathic constipation, Normal, Disp-60 Cap, R-5      lisinopril (PRINIVIL, ZESTRIL) 20 mg tablet Take 1 Tab by mouth daily. , NormalTo replace NO refill orderDisp-90 Tab, R-3      diclofenac (VOLTAREN) 1 % gel Apply 2 g to affected area every six (6) hours. , Normal, Disp-100 g, R-2      docusate sodium (COLACE) 100 mg capsule Take 1 Cap by mouth two (2) times a day., Normal, Disp-60 Cap, R-11      amLODIPine (NORVASC) 5 mg tablet Take 1 Tab by mouth daily. , Normal, Disp-30 Tab, R-11      anastrozole (ARIMIDEX) 1 mg tablet TAKE 1 TABLET BY MOUTH DAILY, Historical Med, R-3      ergocalciferol (ERGOCALCIFEROL) 50,000 unit capsule Take 1 Cap by mouth every seven (7) days. , Normal, Disp-12 Cap, R-4      mometasone (ELOCON) 0.1 % topical cream Apply  to affected area daily. , Normal, Disp-15 g, R-1      ranitidine (ZANTAC) 150 mg tablet Take 1 Tab by mouth two (2) times daily as needed for Indigestion. Take for acid reflux, Normal, Disp-60 Tab, R-11      gabapentin (NEURONTIN) 100 mg capsule TAKE 1 CAP BY MOUTH NIGHTLY AS NEEDED., Normal, Disp-20 Cap, R-1      hydrocortisone (ANUSOL-HC) 2.5 % rectal cream Insert  into rectum four (4) times daily. , Normal, Disp-45 g, R-5      aspirin delayed-release 81 mg tablet Take 1 Tab by mouth daily. , Normal, Disp-90 Tab, R-3         STOP taking these medications       trimethoprim-sulfamethoxazole (BACTRIM DS, SEPTRA DS) 160-800 mg per tablet Comments:   Reason for Stopping:               Provider Notes (Medical Decision Making):   DDX: URI, bronchitis, PNA, viral illness        Procedures        Diagnosis     Clinical Impression:   1.  Acute bronchitis, unspecified organism

## 2019-06-17 ENCOUNTER — HOME CARE VISIT (OUTPATIENT)
Dept: HOME HEALTH SERVICES | Facility: HOME HEALTH | Age: 84
End: 2019-06-17

## 2019-06-17 ENCOUNTER — HOME HEALTH ADMISSION (OUTPATIENT)
Dept: HOME HEALTH SERVICES | Facility: HOME HEALTH | Age: 84
End: 2019-06-17

## 2019-06-17 ENCOUNTER — OFFICE VISIT (OUTPATIENT)
Dept: INTERNAL MEDICINE CLINIC | Age: 84
End: 2019-06-17

## 2019-06-17 VITALS
OXYGEN SATURATION: 98 % | SYSTOLIC BLOOD PRESSURE: 140 MMHG | HEIGHT: 61 IN | DIASTOLIC BLOOD PRESSURE: 72 MMHG | WEIGHT: 128 LBS | BODY MASS INDEX: 24.17 KG/M2 | RESPIRATION RATE: 17 BRPM | TEMPERATURE: 98.4 F | HEART RATE: 68 BPM

## 2019-06-17 DIAGNOSIS — M47.812 FACET ARTHROPATHY, CERVICAL: ICD-10-CM

## 2019-06-17 DIAGNOSIS — Z91.81 HISTORY OF RECENT FALL: ICD-10-CM

## 2019-06-17 DIAGNOSIS — M25.561 BILATERAL CHRONIC KNEE PAIN: ICD-10-CM

## 2019-06-17 DIAGNOSIS — I10 HTN (HYPERTENSION), BENIGN: ICD-10-CM

## 2019-06-17 DIAGNOSIS — G89.29 BILATERAL CHRONIC KNEE PAIN: ICD-10-CM

## 2019-06-17 DIAGNOSIS — I69.30 HISTORY OF STROKE WITH CURRENT RESIDUAL EFFECTS: ICD-10-CM

## 2019-06-17 DIAGNOSIS — M79.601 RIGHT ARM PAIN: ICD-10-CM

## 2019-06-17 DIAGNOSIS — R32 URINARY INCONTINENCE, UNSPECIFIED TYPE: ICD-10-CM

## 2019-06-17 DIAGNOSIS — E05.90 SUBCLINICAL HYPERTHYROIDISM: ICD-10-CM

## 2019-06-17 DIAGNOSIS — M25.562 BILATERAL CHRONIC KNEE PAIN: ICD-10-CM

## 2019-06-17 DIAGNOSIS — R63.0 DECREASED APPETITE: Primary | ICD-10-CM

## 2019-06-17 RX ORDER — DICLOFENAC SODIUM 10 MG/G
2 GEL TOPICAL EVERY 6 HOURS
Qty: 100 G | Refills: 2 | Status: SHIPPED | OUTPATIENT
Start: 2019-06-17 | End: 2020-07-28 | Stop reason: SDUPTHER

## 2019-06-17 RX ORDER — DEXTROMETHORPHAN HYDROBROMIDE, GUAIFENESIN 5; 100 MG/5ML; MG/5ML
650 LIQUID ORAL
Qty: 100 TAB | Refills: 3 | Status: SHIPPED | OUTPATIENT
Start: 2019-06-17 | End: 2020-07-28 | Stop reason: SDUPTHER

## 2019-06-17 RX ORDER — BISMUTH SUBSALICYLATE 262 MG
1 TABLET,CHEWABLE ORAL DAILY
Qty: 90 TAB | Refills: 3 | Status: SHIPPED | OUTPATIENT
Start: 2019-06-17

## 2019-06-17 NOTE — PATIENT INSTRUCTIONS

## 2019-06-17 NOTE — PROGRESS NOTES
Pt is here for   Chief Complaint   Patient presents with    Anorexia     pt daughter states that the pt barely has an appetite    Fall     pt daughter states pt had a fall on 6/14/19   [facility] ED Follow-up     for cold symptoms [facility] on 6/14/19. .. Pt denies pain at this time    1. Have you been to the ER, urgent care clinic since your last visit? Hospitalized since your last visit? Yes When: 6/14/19 [facility] for cold symptoms    2. Have you seen or consulted any other health care providers outside of the [address] [place] [address] since your last visit? Include any pap smears or colon screening.  No

## 2019-06-18 LAB
BUN SERPL-MCNC: 9 MG/DL (ref 8–27)
BUN/CREAT SERPL: 12 (ref 12–28)
CALCIUM SERPL-MCNC: 9.3 MG/DL (ref 8.7–10.3)
CHLORIDE SERPL-SCNC: 101 MMOL/L (ref 96–106)
CO2 SERPL-SCNC: 28 MMOL/L (ref 20–29)
CREAT SERPL-MCNC: 0.75 MG/DL (ref 0.57–1)
FOLATE SERPL-MCNC: 12.4 NG/ML
GLUCOSE SERPL-MCNC: 134 MG/DL (ref 65–99)
MAGNESIUM SERPL-MCNC: 2 MG/DL (ref 1.6–2.3)
POTASSIUM SERPL-SCNC: 3.5 MMOL/L (ref 3.5–5.2)
SODIUM SERPL-SCNC: 145 MMOL/L (ref 134–144)
TSH SERPL DL<=0.005 MIU/L-ACNC: 0.03 UIU/ML (ref 0.45–4.5)
VIT B12 SERPL-MCNC: 612 PG/ML (ref 232–1245)

## 2019-06-19 ENCOUNTER — DOCUMENTATION ONLY (OUTPATIENT)
Dept: INTERNAL MEDICINE CLINIC | Age: 84
End: 2019-06-19

## 2019-06-19 NOTE — PROGRESS NOTES
Amanda Gerene from [id number] called stating that they tried to schedule a visit with pt for Today but the pt denied today's visit and wants then to come next week, Amanda Greene needed a verbal order for pt to be seen next week Wednesday.  VORB given for PT next Wednesday

## 2019-06-26 ENCOUNTER — HOME CARE VISIT (OUTPATIENT)
Dept: HOME HEALTH SERVICES | Facility: HOME HEALTH | Age: 84
End: 2019-06-26

## 2019-06-26 NOTE — PROGRESS NOTES
Her TSH is low. I am forwarding this to Dr. Aarti Bernard also, but this may explain her appetite change they were concerned about. See DR. Aarti Bernard for further treatment and follow-up.

## 2019-07-18 ENCOUNTER — OFFICE VISIT (OUTPATIENT)
Dept: INTERNAL MEDICINE CLINIC | Age: 84
End: 2019-07-18

## 2019-07-18 VITALS
BODY MASS INDEX: 24.19 KG/M2 | OXYGEN SATURATION: 95 % | RESPIRATION RATE: 17 BRPM | SYSTOLIC BLOOD PRESSURE: 136 MMHG | HEART RATE: 63 BPM | DIASTOLIC BLOOD PRESSURE: 75 MMHG | TEMPERATURE: 98 F | HEIGHT: 61 IN

## 2019-07-18 DIAGNOSIS — K59.04 CHRONIC IDIOPATHIC CONSTIPATION: Primary | ICD-10-CM

## 2019-07-18 DIAGNOSIS — M25.562 BILATERAL CHRONIC KNEE PAIN: ICD-10-CM

## 2019-07-18 DIAGNOSIS — M25.561 BILATERAL CHRONIC KNEE PAIN: ICD-10-CM

## 2019-07-18 DIAGNOSIS — Z91.81 HISTORY OF RECENT FALL: ICD-10-CM

## 2019-07-18 DIAGNOSIS — G89.29 BILATERAL CHRONIC KNEE PAIN: ICD-10-CM

## 2019-07-18 DIAGNOSIS — I10 HTN (HYPERTENSION), BENIGN: ICD-10-CM

## 2019-07-18 RX ORDER — LUBIPROSTONE 24 UG/1
24 CAPSULE, GELATIN COATED ORAL 2 TIMES DAILY WITH MEALS
Qty: 60 CAP | Refills: 5 | Status: SHIPPED | OUTPATIENT
Start: 2019-07-18 | End: 2020-07-28 | Stop reason: SDUPTHER

## 2019-07-18 NOTE — PROGRESS NOTES
Pt Is here for   Chief Complaint   Patient presents with    Follow-up     htn, cic    Medication Refill     orders pending     Pt states pain level is a 4/10 knee    1. Have you been to the ER, urgent care clinic since your last visit? Hospitalized since your last visit? No    2. Have you seen or consulted any other health care providers outside of the [address] [place] [address] since your last visit? Include any pap smears or colon screening.  No

## 2019-07-18 NOTE — PATIENT INSTRUCTIONS
Constipation: Care Instructions  Your Care Instructions    Constipation means that you have a hard time passing stools (bowel movements). People pass stools from 3 times a day to once every 3 days. What is normal for you may be different. Constipation may occur with pain in the rectum and cramping. The pain may get worse when you try to pass stools. Sometimes there are small amounts of bright red blood on toilet paper or the surface of stools. This is because of enlarged veins near the rectum (hemorrhoids). A few changes in your diet and lifestyle may help you avoid ongoing constipation. Your doctor may also prescribe medicine to help loosen your stool. Some medicines can cause constipation. These include pain medicines and antidepressants. Tell your doctor about all the medicines you take. Your doctor may want to make a medicine change to ease your symptoms. Follow-up care is a key part of your treatment and safety. Be sure to make and go to all appointments, and call your doctor if you are having problems. It's also a good idea to know your test results and keep a list of the medicines you take. How can you care for yourself at home? · Drink plenty of fluids, enough so that your urine is light yellow or clear like water. If you have kidney, heart, or liver disease and have to limit fluids, talk with your doctor before you increase the amount of fluids you drink. · Include high-fiber foods in your diet each day. These include fruits, vegetables, beans, and whole grains. · Get at least 30 minutes of exercise on most days of the week. Walking is a good choice. You also may want to do other activities, such as running, swimming, cycling, or playing tennis or team sports. · Take a fiber supplement, such as Citrucel or Metamucil, every day. Read and follow all instructions on the label. · Schedule time each day for a bowel movement. A daily routine may help.  Take your time having your bowel movement. · Support your feet with a small step stool when you sit on the toilet. This helps flex your hips and places your pelvis in a squatting position. · Your doctor may recommend an over-the-counter laxative to relieve your constipation. Examples are Milk of Magnesia and MiraLax. Read and follow all instructions on the label. Do not use laxatives on a long-term basis. When should you call for help? Call your doctor now or seek immediate medical care if:    · You have new or worse belly pain.     · You have new or worse nausea or vomiting.     · You have blood in your stools.    Watch closely for changes in your health, and be sure to contact your doctor if:    · Your constipation is getting worse.     · You do not get better as expected. Where can you learn more? Go to http://paola-shane.info/. Enter [id number] in the search box to learn more about \"Constipation: Care Instructions. \"  Current as of: September 23, 2018  Content Version: 11.9  © 6851-7966 [employer]. Care instructions adapted under license by [employer] (which disclaims liability or warranty for this information). If you have questions about a medical condition or this instruction, always ask your healthcare professional. [address] any warranty or liability for your use of this information. Preventing Falls: Care Instructions  Your Care Instructions    Getting around your home safely can be a challenge if you have injuries or health problems that make it easy for you to fall. Loose rugs and furniture in walkways are among the dangers for many older people who have problems walking or who have poor eyesight. People who have conditions such as arthritis, osteoporosis, or dementia also have to be careful not to fall. You can make your home safer with a few simple measures. Follow-up care is a key part of your treatment and safety.  Be sure to make and go to all appointments, and call your doctor if you are having problems. It's also a good idea to know your test results and keep a list of the medicines you take. How can you care for yourself at home? Taking care of yourself  · You may get dizzy if you do not drink enough water. To prevent dehydration, drink plenty of fluids, enough so that your urine is light yellow or clear like water. Choose water and other caffeine-free clear liquids. If you have kidney, heart, or liver disease and have to limit fluids, talk with your doctor before you increase the amount of fluids you drink. · Exercise regularly to improve your strength, muscle tone, and balance. Walk if you can. Swimming may be a good choice if you cannot walk easily. · Have your vision and hearing checked each year or any time you notice a change. If you have trouble seeing and hearing, you might not be able to avoid objects and could lose your balance. · Know the side effects of the medicines you take. Ask your doctor or pharmacist whether the medicines you take can affect your balance. Sleeping pills or sedatives can affect your balance. · Limit the amount of alcohol you drink. Alcohol can impair your balance and other senses. · Ask your doctor whether calluses or corns on your feet need to be removed. If you wear loose-fitting shoes because of calluses or corns, you can lose your balance and fall. · Talk to your doctor if you have numbness in your feet. Preventing falls at home  · Remove raised doorway thresholds, throw rugs, and clutter. Repair loose carpet or raised areas in the floor. · Move furniture and electrical cords to keep them out of walking paths. · Use nonskid floor wax, and wipe up spills right away, especially on ceramic tile floors. · If you use a walker or cane, put rubber tips on it. If you use crutches, clean the bottoms of them regularly with an abrasive pad, such as steel wool.   · Keep your house well lit, especially stairways, porches, and outside walkways. Use night-lights in areas such as hallways and bathrooms. Add extra light switches or use remote switches (such as switches that go on or off when you clap your hands) to make it easier to turn lights on if you have to get up during the night. · Install sturdy handrails on stairways. · Move items in your cabinets so that the things you use a lot are on the lower shelves (about waist level). · Keep a cordless phone and a flashlight with new batteries by your bed. If possible, put a phone in each of the main rooms of your house, or carry a cell phone in case you fall and cannot reach a phone. Or, you can wear a device around your neck or wrist. You push a button that sends a signal for help. · Wear low-heeled shoes that fit well and give your feet good support. Use footwear with nonskid soles. Check the heels and soles of your shoes for wear. Repair or replace worn heels or soles. · Do not wear socks without shoes on wood floors. · Walk on the grass when the sidewalks are slippery. If you live in an area that gets snow and ice in the winter, sprinkle salt on slippery steps and sidewalks. Preventing falls in the bath  · Install grab bars and nonskid mats inside and outside your shower or tub and near the toilet and sinks. · Use shower chairs and bath benches. · Use a hand-held shower head that will allow you to sit while showering. · Get into a tub or shower by putting the weaker leg in first. Get out of a tub or shower with your strong side first.  · Repair loose toilet seats and consider installing a raised toilet seat to make getting on and off the toilet easier. · Keep your bathroom door unlocked while you are in the shower. Where can you learn more? Go to http://paola-shane.info/. Enter [phone] [id number] [phone] in the search box to learn more about \"Preventing Falls: Care Instructions. \"  Current as of: March 15, 2018  Content Version: 11.9  © 9771-4027 [employer]. Care instructions adapted under license by [employer] (which disclaims liability or warranty for this information). If you have questions about a medical condition or this instruction, always ask your healthcare professional. [address] any warranty or liability for your use of this information.

## 2019-07-18 NOTE — PROGRESS NOTES
Malachi Fu is a 80 y.o. female and presents with Follow-up (htn, cic)    Subjective:  Pt here to f/u HTN, chronic pain and CIC. Taking stool softener, tylenol, and gel. Feels good. No acute complaints otherwise. Denies side effects from medication. Chronic constipation/IBS-C Review:  Here today to report chronic constipation for the last several years. Reports averaging 3-4 BMs every 7 days. Associated with bloating, abdominal pain, flatulence, and nausea. Denies rectal bleeding, laxative overuse, and decreased appetite. LBM: last night, Ellerbe# 5. Tried OTC stool softeners with some effectiveness. Currently taking docusate?, without side effects. Reports NO use of opiates or iron supplements. Takes more than 10 meds. Pt also with another fall recently while standing. Pt unsure of situation around fall. No head injury or LOC reported. No pain from fall. Review of Systems  Constitutional: negative for fevers, chills, anorexia and weight loss  Respiratory:  negative for cough, hemoptysis, dyspnea, and wheezing  CV:   +HTN. negative for chest pain, palpitations, and lower extremity edema  GI:   negative for nausea, vomiting, diarrhea, abdominal pain, and melena  Endo:               + thyroid dysfunction, followed by Dr. Frankie Rebollar.  negative for polyuria,polydipsia,polyphagia, and heat intolerance  Genitourinary: negative for frequency, urgency, dysuria, retention, and hematuria  Integument:  negative for rash, ulcerations  Hematologic:  negative for easy bruising and bleeding  Musculoskel: negative for muscle weakness  Neurological:  negative for headaches, dizziness, vertigo,and memory problems  Behavl/Psych: negative for feelings of anxiety, depression, suicide, and mood changes    Past Medical History:   Diagnosis Date    Cancer ([facility] [address]     right breast.    Chronic pain     CVA     HTN (hypertension) 2/9/2011    Hypertension     Microscopic hematuria 5/11/2011    Other and unspecified hyperlipidemia 2/9/2011    Subclinical hyperthyroidism 8/29/2015    Venous thrombosis 2/17/2010    left arm DVT     Past Surgical History:   Procedure Laterality Date    BREAST SURGERY PROCEDURE UNLISTED      HX HEENT      HX HEMORRHOIDECTOMY       Social History     Socioeconomic History    Marital status:      Spouse name: Not on file    Number of children: Not on file    Years of education: Not on file    Highest education level: Not on file   Tobacco Use    Smoking status: Former Smoker    Smokeless tobacco: Never Used    Tobacco comment: smoked for 10 yrs   Substance and Sexual Activity    Alcohol use: Yes     Alcohol/week: 0.8 standard drinks     Types: 1 Cans of beer per week     Comment: occ    Drug use: No    Sexual activity: Never     Family History   Problem Relation Age of Onset    Stroke Mother     Stroke Father     Diabetes Daughter      Current Outpatient Medications   Medication Sig Dispense Refill    diclofenac (VOLTAREN) 1 % gel Apply 2 g to affected area every six (6) hours. 100 g 2    acetaminophen (TYLENOL ARTHRITIS PAIN) 650 mg TbER Take 1 Tab by mouth three (3) times daily as needed for Pain. Indications: Pain associated with Arthritis 100 Tab 3    multivitamin (ONE A DAY) tablet Take 1 Tab by mouth daily. Indications: Treatment To Prevent Vitamin Deficiency 90 Tab 3    azithromycin (ZITHROMAX) 250 mg tablet Take two tablets today then one tablet daily 6 Tab 0    metoprolol succinate (TOPROL-XL) 50 mg XL tablet Take 1 Tab by mouth daily. 90 Tab 3    methIMAzole (TAPAZOLE) 5 mg tablet Take 1 Tab by mouth every Monday, Wednesday, Friday. 30 Tab 11    naloxone (NARCAN) 4 mg/actuation nasal spray Use 1 spray into 1 nostril for OVERDOSE. Call 911. For subsequent doses, give in alternating nostrils. May repeat every 2 to 3 min. 2 Each 0    simvastatin (ZOCOR) 20 mg tablet Take 1 Tab by mouth nightly.  90 Tab 3    lubiPROStone (AMITIZA) 24 mcg capsule Take 1 Cap by mouth two (2) times daily (with meals). Indications: chronic idiopathic constipation 60 Cap 5    lisinopril (PRINIVIL, ZESTRIL) 20 mg tablet Take 1 Tab by mouth daily. 90 Tab 3    docusate sodium (COLACE) 100 mg capsule Take 1 Cap by mouth two (2) times a day. 60 Cap 11    amLODIPine (NORVASC) 5 mg tablet Take 1 Tab by mouth daily. 30 Tab 11    anastrozole (ARIMIDEX) 1 mg tablet TAKE 1 TABLET BY MOUTH DAILY  3    ergocalciferol (ERGOCALCIFEROL) 50,000 unit capsule Take 1 Cap by mouth every seven (7) days. 12 Cap 4    mometasone (ELOCON) 0.1 % topical cream Apply  to affected area daily. 15 g 1    ranitidine (ZANTAC) 150 mg tablet Take 1 Tab by mouth two (2) times daily as needed for Indigestion. Take for acid reflux 60 Tab 11    gabapentin (NEURONTIN) 100 mg capsule TAKE 1 CAP BY MOUTH NIGHTLY AS NEEDED. 20 Cap 1    hydrocortisone (ANUSOL-HC) 2.5 % rectal cream Insert  into rectum four (4) times daily. 45 g 5    aspirin delayed-release 81 mg tablet Take 1 Tab by mouth daily. 90 Tab 3    miscellaneous medical supply [facility]. 1 Each 0     No Known Allergies    Objective:  Visit Vitals  /75 (BP 1 Location: Left arm, BP Patient Position: Sitting)   Pulse 63   Temp 98 °F (36.7 °C) (Oral)   Resp 17   Ht 5' 1\" (1.549 m)   SpO2 95%   BMI 24.19 kg/m²     Wt Readings from Last 3 Encounters:   06/17/19 128 lb (58.1 kg)   06/14/19 130 lb (59 kg)   04/17/19 130 lb (59 kg)     Physical Exam:    General appearance - alert, well appearing, and in no distress. Seated comfortably in W/C. Mental status - A/O x 4, normal mood and affect. Neck - No JVD. Chest - CTA. Symmetric chest rise. No wheezing, rales or rhonchi. Heart - Normal rate, regular rhythm. Normal S1, S2. No MGR or clicks. Ext- Radial, DP pulses, 2+ bilaterally. No pedal edema, clubbing, or cyanosis. Skin-Warm and dry. No clubbing or cyanosis. Hyperpigmentation to multiple nevi and skin tags.   Neuro - Normal speech, no focal findings or movement disorder. Normal strength, gait, and muscle tone. Assessment/Plan:  Pt no longer needs opiate, advised continued use of non-opiate therapy. Pt advised home PT would have been helfpul since she reportedly refused services citing being unaware of referral today during visit. [place] resent to increase stools to daily and unsure if pt taking docusate or not   Medication Side Effects and Warnings were discussed with patient: yes   Patient Labs were reviewed: yes  Patient Past Records were reviewed: yes    See below for other orders       No diagnosis found. No orders of the defined types were placed in this encounter. Jignesh Livingston expressed understanding of plan. An After Visit Summary was offered/printed and given to the patient. The patient's abnormal BMI was reviewed and deemed target BMI for the patient. An After Visit Summary was provided to the patient and/or caregiver.

## 2019-07-19 ENCOUNTER — TELEPHONE (OUTPATIENT)
Dept: INTERNAL MEDICINE CLINIC | Age: 84
End: 2019-07-19

## 2019-07-19 NOTE — TELEPHONE ENCOUNTER
Pharmacy sent request over for  Alternative medication.    The medication prescribed is  Amitiza 24mcg

## 2019-07-24 DIAGNOSIS — K59.04 CHRONIC IDIOPATHIC CONSTIPATION: ICD-10-CM

## 2019-07-24 RX ORDER — LUBIPROSTONE 24 UG/1
24 CAPSULE, GELATIN COATED ORAL 2 TIMES DAILY WITH MEALS
Qty: 60 CAP | Refills: 5 | OUTPATIENT
Start: 2019-07-24

## 2019-08-01 DIAGNOSIS — I10 HTN (HYPERTENSION), BENIGN: ICD-10-CM

## 2019-08-01 RX ORDER — LISINOPRIL 20 MG/1
TABLET ORAL
Qty: 90 TAB | Refills: 3 | Status: SHIPPED | OUTPATIENT
Start: 2019-08-01 | End: 2020-07-28 | Stop reason: SDUPTHER

## 2019-09-24 ENCOUNTER — TELEPHONE (OUTPATIENT)
Dept: INTERNAL MEDICINE CLINIC | Age: 84
End: 2019-09-24

## 2019-09-24 NOTE — TELEPHONE ENCOUNTER
FYI Pt's  daughter Everett Benoit called today stating the nurse came in  today and took Ms. Doll's blood pressure. It was 180/80 and she waited 10 minutes and took it again it was 190/98. Her heart rate was 49. Pt had not taken her blood pressure medication.   I spoke with you and per your instructions the call was transferred to [facility]

## 2019-09-24 NOTE — TELEPHONE ENCOUNTER
Since this morning. Family called [facility] after noon and they report normal HR in 60's and normal 's/80's. Called her to speak with this writer to see if I wanted them to administer the BB or not. Advised to continue holding and repeat BP/HR in the morning. If above 60, may resume med regimen as prescribed.

## 2019-10-18 ENCOUNTER — OFFICE VISIT (OUTPATIENT)
Dept: INTERNAL MEDICINE CLINIC | Age: 84
End: 2019-10-18

## 2019-10-18 VITALS
HEART RATE: 70 BPM | OXYGEN SATURATION: 97 % | HEIGHT: 61 IN | WEIGHT: 130 LBS | SYSTOLIC BLOOD PRESSURE: 148 MMHG | DIASTOLIC BLOOD PRESSURE: 82 MMHG | BODY MASS INDEX: 24.55 KG/M2 | RESPIRATION RATE: 18 BRPM | TEMPERATURE: 97.3 F

## 2019-10-18 DIAGNOSIS — R41.3 IMPAIRED MEMORY: ICD-10-CM

## 2019-10-18 DIAGNOSIS — K59.04 CHRONIC IDIOPATHIC CONSTIPATION: ICD-10-CM

## 2019-10-18 DIAGNOSIS — Z91.199 PATIENT NON ADHERENCE: ICD-10-CM

## 2019-10-18 DIAGNOSIS — G31.84 MCI (MILD COGNITIVE IMPAIRMENT): Primary | ICD-10-CM

## 2019-10-18 DIAGNOSIS — I10 HTN (HYPERTENSION), BENIGN: ICD-10-CM

## 2019-10-18 RX ORDER — DONEPEZIL HYDROCHLORIDE 5 MG/1
5 TABLET, FILM COATED ORAL
Qty: 30 TAB | Refills: 1 | Status: SHIPPED | OUTPATIENT
Start: 2019-10-18 | End: 2020-01-13

## 2019-10-18 RX ORDER — CHOLECALCIFEROL (VITAMIN D3) 25 MCG
TABLET ORAL
Refills: 1 | COMMUNITY
Start: 2019-08-01 | End: 2019-12-18 | Stop reason: SDUPTHER

## 2019-10-18 NOTE — PATIENT INSTRUCTIONS
Helping A Person With Dementia: Care Instructions  Your Care Instructions    Dementia is a loss of mental skills that affects daily life. It is different from mild memory loss that occurs with aging. Dementia can cause problems with memory, thinking clearly, and planning. It is different for everyone. But it usually gets worse slowly. Some people who have dementia can function well for a long time. But at some point it may become hard for the person to care for himself or herself. It can be upsetting to learn that a loved one has this condition. You may be afraid and worried about what will happen. You may wonder how you will care for the person. There is no cure for dementia. But medicine may be able to slow memory loss and improve thinking for a while. Other medicines may help with sleep, depression, and behavior changes. Dementia is different for everyone. In some cases, people can function well for a long time. You can help your loved one by making his or her home life easier and safer. You also need to take care of yourself. Caregiving can be stressful. But support is available to help you and give you a break when you need it. The Alzheimer's [employer] offers good information and support. If you are caring for someone with dementia, you can help make life safer and more comfortable. You can also help your loved one make decisions about future care. You may also want to bring up legal and financial issues. These are hard but important conversations to have. Follow-up care is a key part of your loved one's treatment and safety. Be sure to make and go to all appointments, and call your doctor if your loved one is having problems. It's also a good idea to know your loved one's test results and keep a list of the medicines he or she takes. How can you care for your loved one at home? Taking care of the person  · If the person takes medicine for dementia, help him or her take it exactly as prescribed. Call the doctor if you notice any problems with the medicine. · Make a list of the person's medicines. Review it with all of his or her doctors. · Help the person eat a balanced diet. Serve plenty of whole grains, fruits, and vegetables every day. If the person is not hungry at mealtimes, give snacks at midmorning and in the afternoon. Offer drinks such as Boost, Ensure, or Sustacal if the person is losing weight. · Encourage exercise. Walking and other activities may slow the decline of mental ability. Help the person stay active mentally with reading, crossword puzzles, or other hobbies. · Talk openly with the doctor about any behavior changes. Many people who have dementia become easily upset or agitated or feel worried. There are many things that can cause this, such as medicine side effects, confusion, and pain. It may be helpful to:  ? Keep distractions to a minimum. It may also help to keep noise levels low and voices quiet. ? Develop simple daily routines for bathing, dressing, and other activities. And remind your loved one often about upcoming changes to the daily routine, such as trips or appointments. ? Ask what is upsetting him or her. Keep in mind that people who have dementia don't always know why they are upset. · Take steps to help if the person is sundowning. This is the restless behavior and trouble with sleeping that may occur in late afternoon and at night. Try not to let the person nap during the day. Offer a glass of warm milk or caffeine-free tea before bedtime. · Be patient. A task may take the person longer than it used to. · For as long as he or she is able, allow your loved one to make decisions about activities, food, clothing, and other choices. Let him or her be independent, even if tasks take more time or are not done perfectly. Tailor tasks to the person's abilities. For example, if cooking is no longer safe, ask for other help.  Your loved one can help set the table, or make simple dishes such as a salad. When the person needs help, offer it gently. Staying safe  · Make your home (or your loved one's home) safe. Tack down rugs, and put no-slip tape in the tub. Install handrails, and put safety switches on stoves and appliances. Keep rooms free of clutter. Make sure walkways around furniture are clear. Do not move furniture around, because the person may become confused. · Use locks on doors and cupboards. Lock up knives, scissors, medicines, cleaning supplies, and other dangerous things. · Do not let the person drive or cook if he or she can't do it safely. A person with dementia should not drive unless he or she is able to pass an on-road driving test. Your state 's license bureau can do a driving test if there is any question. · Get medical alert jewelry for the person so that you can be contacted if he or she wanders away. If possible, provide a safe place for wandering, such as an enclosed yard or garden. Taking care of yourself  · Ask your doctor about support groups and other resources in your area. · Take care of your health. Be sure to eat healthy foods and get enough rest and exercise. · Take time for yourself. Respite services provide someone to stay with the person for a short time while you get out of the house for a few hours. · Make time for an activity that you enjoy. Read, listen to music, paint, do crafts, or play an instrument, even if it's only for a few minutes a day. · Spend time with family, friends, and others in your support system. When should you call for help? Call 911 anytime you think the person may need emergency care.  For example, call if:    · The person who has dementia wanders away and you can't find him or her.     · The person who has dementia is seriously injured.    Call the doctor now or seek immediate medical care if:    · The person suddenly sees things that are not there (hallucinates).     · The person has a sudden change in his or her behavior.    Watch closely for changes in the person's health, and be sure to contact the doctor if:    · The person has symptoms that could cause injury.     · The person has problems with his or her medicine.     · You need more information to care for a person with dementia.     · You need respite care so you can take a break. Where can you learn more? Go to http://paola-shane.info/. Enter I576 in the search box to learn more about \"Helping A Person With Dementia: Care Instructions. \"  Current as of: May 28, 2019  Content Version: 12.2  © 1128-8373 [employer]. Care instructions adapted under license by [employer] (which disclaims liability or warranty for this information). If you have questions about a medical condition or this instruction, always ask your healthcare professional. [address] any warranty or liability for your use of this information. Learning About Dementia  What is dementia? We all forget things as we get older. Many older people have a slight loss of memory that does not affect their daily lives. But memory loss that gets worse may mean that you have dementia. Dementia is a loss of mental skills that affects your daily life. It can cause problems with memory, problem-solving, and learning. It also can cause problems with thinking and planning. Dementia usually gets worse over time. But how quickly it gets worse is different for each person. Some people stay the same for years. Others lose skills quickly. Your chances of having dementia rise as you get older. But this doesn't mean that everyone will get it. How is dementia diagnosed? To diagnose dementia, your doctor will:  · Do a physical exam.  · Ask questions about recent and past illnesses and life events. The doctor will want to talk to a close family member to check details.   · Ask you to do some simple things that test your memory and other mental skills. Your doctor may ask you to tell what day and year it is, repeat a series of words, or draw a clock face. The doctor may do tests to look for a cause that can be treated. For example, you might have blood tests to check your thyroid or to look for an infection. You might also have a test that shows a picture of your brain, like an MRI or a CT scan. These tests can help your doctor find a tumor or brain injury. Knowing the type of dementia a person has can help the doctor prescribe medicines or other treatments. What are the symptoms? Usually the first symptom of dementia is memory loss. Often the person who has the memory problem doesn't notice it, but family and friends do. People who have dementia may have increasing trouble with:  · Recalling recent events. They may forget appointments or lose objects. · Recognizing people and places. · Keeping up with conversations and activity. · Finding their way around familiar places, or driving to and from places they know well. · Keeping up personal care such as grooming or bathing. · Planning and carrying out routine tasks. They may have trouble following a recipe or writing a letter or email. How is dementia treated? Medicines for dementia can slow it down for a while and make it easier to live with. Medicines can't cure it. But they may help improve mental function, mood, or behavior. If a stroke caused the dementia, doing things to reduce the chance of another stroke may help. They include eating healthy foods, being active, staying at a healthy weight, and not smoking. As dementia gets worse, a person may get depressed or angry and upset. An active social life, counseling, and sometimes medicine may help with changing emotions. The goals of ongoing treatment are to keep the person safely at home as long as possible and to provide support and guidance to the caregivers. The person will need routine follow-up visits.  The doctor will monitor medicines and the person's level of functioning. Follow-up care is a key part of your treatment and safety. Be sure to make and go to all appointments, and call your doctor if you are having problems. It's also a good idea to know your test results and keep a list of the medicines you take. Where can you learn more? Go to http://paola-shane.info/. Enter [id number] in the search box to learn more about \"Learning About Dementia. \"  Current as of: May 28, 2019  Content Version: 12.2  © 9289-2762 [employer]. Care instructions adapted under license by [employer] (which disclaims liability or warranty for this information). If you have questions about a medical condition or this instruction, always ask your healthcare professional. [address] any warranty or liability for your use of this information.

## 2019-10-18 NOTE — PROGRESS NOTES
Emy Jones is a 80 y.o. female and presents with Follow-up ([facility], pt states that the constipation is the same, but also states that she onlt takes once a day , HTN)    Subjective:  Daughters are concerned for pt memory, TALKING to herself more, feels pt more forgetful lately. Missing meds, awakening daughter early in the morning, and refusing to do certain things. Concerned for dementia. Chronic constipation/IBS-C Review:  Here today to report chronic constipation for the last several years. Reports averaging 5 BMs every 7 days. No bloating, abdominal pain, flatulence, and nausea. Denies rectal bleeding, laxative overuse, and decreased appetite. LBM: last night, [place]# 5. Tried OTC stool softeners with some effectiveness. Currently taking Amitiza ONCE DAILY without side effects, since liked to sit on porch and didn't want to have diarrhea with extra dose taken. Reports NO use of opiates or iron supplements. Takes more than 10 meds. No more falls since last OV. Hypertension Review:  The patient has hypertension  Diet and Lifestyle: generally does try to follow a  low sodium diet, exercises rarely. Home BP Monitoring: is not measured at home. Pertinent ROS: taking medications as instructed, no medication side effects noted. No TIA's, chest pain on exertion, dyspnea on exertion, or swelling of ankles. BP Readings from Last 3 Encounters:   10/18/19 148/82   10/16/19 130/80   07/18/19 136/75     Review of Systems  Constitutional: negative for fevers, chills, anorexia and weight loss  Respiratory:  negative for cough, hemoptysis, dyspnea, and wheezing  CV:   negative for chest pain, palpitations, and lower extremity edema  GI:   negative for nausea, vomiting, diarrhea, abdominal pain, and melena  Endo:               + thyroid dysfunction, followed by Dr. Martine Oreilly.  negative for polyuria,polydipsia,polyphagia, and heat intolerance  Genitourinary: negative for frequency, urgency, dysuria, retention, and hematuria  Integument:  negative for rash, ulcerations  Hematologic:  negative for easy bruising and bleeding  Musculoskel: negative for muscle weakness  Neurological:  negative for headaches, dizziness, vertigo,and memory problems  Behavl/Psych: negative for feelings of anxiety, depression, suicide, and mood changes    Past Medical History:   Diagnosis Date    Cancer ([facility] [address]     right breast.    Chronic pain     CVA     HTN (hypertension) 2/9/2011    Hypertension     Microscopic hematuria 5/11/2011    Other and unspecified hyperlipidemia 2/9/2011    Subclinical hyperthyroidism 8/29/2015    Venous thrombosis 2/17/2010    left arm DVT     Past Surgical History:   Procedure Laterality Date    BREAST SURGERY PROCEDURE UNLISTED      HX HEENT      HX HEMORRHOIDECTOMY       Social History     Socioeconomic History    Marital status: UNKNOWN     Spouse name: Not on file    Number of children: Not on file    Years of education: Not on file    Highest education level: Not on file   Tobacco Use    Smoking status: Former Smoker    Smokeless tobacco: Never Used    Tobacco comment: smoked for 10 yrs   Substance and Sexual Activity    Alcohol use: Yes     Alcohol/week: 0.8 standard drinks     Types: 1 Cans of beer per week     Comment: occ    Drug use: No    Sexual activity: Never     Family History   Problem Relation Age of Onset    Stroke Mother     Stroke Father     Diabetes Daughter      Current Outpatient Medications   Medication Sig Dispense Refill    VITAMIN D3 1,000 unit (25 mcg) tablet TAKE 1 TABLET BY MOUTH EVERY DAY  1    donepezil (ARICEPT) 5 mg tablet Take 1 Tab by mouth nightly. 30 Tab 1    lisinopril (PRINIVIL, ZESTRIL) 20 mg tablet TAKE 1 TABLET BY MOUTH EVERY DAY 90 Tab 3    lubiPROStone (AMITIZA) 24 mcg capsule Take 1 Cap by mouth two (2) times daily (with meals).  Indications: chronic idiopathic constipation 60 Cap 5    diclofenac (VOLTAREN) 1 % gel Apply 2 g to affected area every six (6) hours. 100 g 2    acetaminophen (TYLENOL ARTHRITIS PAIN) 650 mg TbER Take 1 Tab by mouth three (3) times daily as needed for Pain. Indications: Pain associated with Arthritis 100 Tab 3    multivitamin (ONE A DAY) tablet Take 1 Tab by mouth daily. Indications: Treatment To Prevent Vitamin Deficiency 90 Tab 3    metoprolol succinate (TOPROL-XL) 50 mg XL tablet Take 1 Tab by mouth daily. 90 Tab 3    methIMAzole (TAPAZOLE) 5 mg tablet Take 1 Tab by mouth every Monday, Wednesday, Friday. 30 Tab 11    simvastatin (ZOCOR) 20 mg tablet Take 1 Tab by mouth nightly. 90 Tab 3    amLODIPine (NORVASC) 5 mg tablet Take 1 Tab by mouth daily. 30 Tab 11    anastrozole (ARIMIDEX) 1 mg tablet TAKE 1 TABLET BY MOUTH DAILY  3    ergocalciferol (ERGOCALCIFEROL) 50,000 unit capsule Take 1 Cap by mouth every seven (7) days. 12 Cap 4    mometasone (ELOCON) 0.1 % topical cream Apply  to affected area daily. 15 g 1    ranitidine (ZANTAC) 150 mg tablet Take 1 Tab by mouth two (2) times daily as needed for Indigestion. Take for acid reflux 60 Tab 11    gabapentin (NEURONTIN) 100 mg capsule TAKE 1 CAP BY MOUTH NIGHTLY AS NEEDED. 20 Cap 1    hydrocortisone (ANUSOL-HC) 2.5 % rectal cream Insert  into rectum four (4) times daily. 45 g 5    aspirin delayed-release 81 mg tablet Take 1 Tab by mouth daily. 90 Tab 3    [facility]. 1 Each 0    naloxone (NARCAN) 4 mg/actuation nasal spray Use 1 spray into 1 nostril for OVERDOSE. Call 911. For subsequent doses, give in alternating nostrils. May repeat every 2 to 3 min.  2 Each 0     No Known Allergies    Objective:  Visit Vitals  /82 (BP 1 Location: Left arm, BP Patient Position: Sitting)   Pulse 70   Temp 97.3 °F (36.3 °C) (Oral)   Resp 18   Ht 5' 1\" (1.549 m)   Wt 130 lb (59 kg)   SpO2 97%   BMI 24.56 kg/m²     Wt Readings from Last 3 Encounters:   10/18/19 130 lb (59 kg)   06/17/19 128 lb (58.1 kg)   06/14/19 130 lb (59 kg)     Physical Exam:    General appearance - alert, well appearing, and in no distress. Seated comfortably in W/C. Mental status - A/O x 2 (person, place). normal mood and affect. Abnormal CDT and unable to recall 0/3 words. Neck - No JVD. Chest - CTA. Symmetric chest rise. No wheezing, rales or rhonchi. Heart - Normal rate, regular rhythm. Normal S1, S2. No MGR or clicks. Ext- Radial, DP pulses, 2+ bilaterally. No pedal edema, clubbing, or cyanosis. Skin-Warm and dry. No clubbing or cyanosis. Hyperpigmentation to multiple nevi and skin tags. Neuro - Normal speech, no focal findings or movement disorder. Normal strength, gait, and muscle tone. Assessment/Plan:  Referred to Dr. Siddhartha Vicente. Trial of Aricept 5 mg started. Asked to take Amitiza BID as prescribed and increase med adherence. May refer for home health med mgt, but family reportedly uses pill box and pt reportedly takes, unsure of reliability at this time of pt adherence. Medication Side Effects and Warnings were discussed with patient: yes   Patient Labs were reviewed: yes  Patient Past Records were reviewed: yes    See below for other orders   Follow-up and Dispositions    · Return in about 2 months (around 12/18/2019) for dementia f/u, HTN.           ICD-10-CM ICD-9-CM    1. MCI (mild cognitive impairment) G31.84 331.83 REFERRAL TO NEUROLOGY      donepezil (ARICEPT) 5 mg tablet   2. Patient non adherence Z91.19 V15.81 REFERRAL TO NEUROLOGY   3. Impaired memory R41.3 780.93 REFERRAL TO NEUROLOGY   4. HTN (hypertension), benign I10 401.1    5.  Chronic idiopathic constipation K59.04 564.00      Orders Placed This Encounter   Randal Walker[facility] Neurology ref [id number] [address] [facility]     Referral Priority:   Routine     Referral Type:   Consultation     Referral Reason:   Specialty Services Required     Referred to Provider:   Bryon Sanchez, PHD     Number of Visits Requested:   1    VITAMIN D3 1,000 unit (25 mcg) tablet     Sig: TAKE 1 TABLET BY MOUTH EVERY DAY     Refill:  1    donepezil (ARICEPT) 5 mg tablet     Sig: Take 1 Tab by mouth nightly. Dispense:  30 Tab     Refill:  1       Alfred Doll expressed understanding of plan. An After Visit Summary was offered/printed and given to the patient. The patient's abnormal BMI was reviewed and deemed target BMI for the patient. An After Visit Summary was provided to the patient and/or caregiver.

## 2019-10-18 NOTE — PROGRESS NOTES
Pt is here for   Chief Complaint   Patient presents with    Follow-up     [facility], pt states that the constipation is the same, but also states that she onlt takes once a day , HTN       Pt denies pain at this time    1. Have you been to the ER, urgent care clinic since your last visit? Hospitalized since your last visit? No    2. Have you seen or consulted any other health care providers outside of the [address] [place] [address] since your last visit? Include any pap smears or colon screening.  No

## 2019-11-05 ENCOUNTER — TELEPHONE (OUTPATIENT)
Dept: NEUROLOGY | Age: 84
End: 2019-11-05

## 2019-11-05 NOTE — TELEPHONE ENCOUNTER
----- Message from Shane Ahmadi sent at 11/5/2019  3:47 PM EST -----  Regarding: Dr. Rosenberg Levels  General Message/Vendor Calls    Caller's first and last name: Yenni Doll/daughter      Reason for call: requesting a call back in regards to scheduling a np appointment       Callback required yes/no and why: yes      Best contact number(s): [phone]      Details to clarify the request:      Shane Ahmadi

## 2019-11-13 ENCOUNTER — TELEPHONE (OUTPATIENT)
Dept: INTERNAL MEDICINE CLINIC | Age: 84
End: 2019-11-13

## 2019-11-13 NOTE — TELEPHONE ENCOUNTER
I agree, calibration may be needed or a new cuff altogether. If her BP continues to stay elevated, I WOULD prefer an office visit to confirm elevated BP and determine cause. Some common culprits include pain, high salt intake, or improperly sized cuff. They need to be sure, none of these factors are occurring when concerned for BP readings and adjust if possible by treating pain, eating less salt, and ensuring proper cuff size is used.

## 2019-11-13 NOTE — TELEPHONE ENCOUNTER
Pt's daughter called stating her mother's blood pressure is high. The other day she saw the cancer doctor @ [facility] and it was in the 200's. Today Ryan Gonzalez checked it and it is 208/128. She states pt is not having any symptoms at this time.  She thinks the bp monitor may need to be calibrated because earlier she took it and it was 170/108 and it goes up and down  Pt # [phone]

## 2019-12-02 ENCOUNTER — TELEPHONE (OUTPATIENT)
Dept: INTERNAL MEDICINE CLINIC | Age: 84
End: 2019-12-02

## 2019-12-02 NOTE — TELEPHONE ENCOUNTER
Pt's daughter, Melinda Goon called stating they could not keep appt with neurologist you referred her to because of transportation issue. They called ins co and was given a phone number for neurology at [facility]. When she tried to make appt she was told the office had to do it. They did not give her a physician's name.   Neurolgy # [phone]

## 2019-12-06 ENCOUNTER — TELEPHONE (OUTPATIENT)
Dept: INTERNAL MEDICINE CLINIC | Age: 84
End: 2019-12-06

## 2019-12-06 NOTE — TELEPHONE ENCOUNTER
Actually, I don't usually prescribe Delsyum, but if coughing is her main symptoms I can prescribe a cough suppressant. As long as it does NOT have DM or ephedrine, she will be fine.

## 2019-12-06 NOTE — TELEPHONE ENCOUNTER
Pt's daughter, Manju Back called stating pt has a bad cold. She tried to get dispatch health but they do not have any availability. She has a cough and sounds like she is talking woozy in the head. You previously prescribed delsym but she wants to be sure since her blood pressure fluctuates.   Gertrudis's # [phone]

## 2019-12-18 ENCOUNTER — OFFICE VISIT (OUTPATIENT)
Dept: INTERNAL MEDICINE CLINIC | Age: 84
End: 2019-12-18

## 2019-12-18 VITALS
SYSTOLIC BLOOD PRESSURE: 152 MMHG | HEIGHT: 61 IN | DIASTOLIC BLOOD PRESSURE: 66 MMHG | TEMPERATURE: 98.3 F | HEART RATE: 77 BPM | OXYGEN SATURATION: 99 % | RESPIRATION RATE: 17 BRPM | BODY MASS INDEX: 24.56 KG/M2

## 2019-12-18 DIAGNOSIS — I10 ESSENTIAL HYPERTENSION: ICD-10-CM

## 2019-12-18 DIAGNOSIS — J06.9 ACUTE URI: Primary | ICD-10-CM

## 2019-12-18 DIAGNOSIS — E78.2 MIXED HYPERLIPIDEMIA: ICD-10-CM

## 2019-12-18 RX ORDER — SIMVASTATIN 20 MG/1
20 TABLET, FILM COATED ORAL
Qty: 90 TAB | Refills: 3 | Status: SHIPPED | OUTPATIENT
Start: 2019-12-18 | End: 2021-01-27 | Stop reason: SDUPTHER

## 2019-12-18 RX ORDER — MELATONIN
2000 DAILY
Qty: 180 TAB | Refills: 3 | Status: SHIPPED | OUTPATIENT
Start: 2019-12-18 | End: 2021-01-27 | Stop reason: SDUPTHER

## 2019-12-18 RX ORDER — BENZONATATE 100 MG/1
100 CAPSULE ORAL
Qty: 15 CAP | Refills: 0 | Status: SHIPPED | OUTPATIENT
Start: 2019-12-18 | End: 2019-12-25

## 2019-12-18 RX ORDER — AMLODIPINE BESYLATE 5 MG/1
5 TABLET ORAL DAILY
Qty: 90 TAB | Refills: 3 | Status: SHIPPED | OUTPATIENT
Start: 2019-12-18 | End: 2020-07-28

## 2019-12-18 NOTE — PATIENT INSTRUCTIONS
If she continues to have cough, develops shortness of breath, fever, or chest pain. Report to urgent care or call for a chest xray. None needed today as she just has a cough, that is resolving. You have been referred to dementia screening  Liliana Blevins   [address]  [place]  Phone: [phone]  Fax: [phone]         Upper Respiratory Infection (Cold): Care Instructions  Your Care Instructions    An upper respiratory infection, or URI, is an infection of the nose, sinuses, or throat. URIs are spread by coughs, sneezes, and direct contact. The common cold is the most frequent kind of URI. The flu and sinus infections are other kinds of URIs. Almost all URIs are caused by viruses. Antibiotics won't cure them. But you can treat most infections with home care. This may include drinking lots of fluids and taking over-the-counter pain medicine. You will probably feel better in 4 to 10 days. The doctor has checked you carefully, but problems can develop later. If you notice any problems or new symptoms, get medical treatment right away. Follow-up care is a key part of your treatment and safety. Be sure to make and go to all appointments, and call your doctor if you are having problems. It's also a good idea to know your test results and keep a list of the medicines you take. How can you care for yourself at home? · To prevent dehydration, drink plenty of fluids, enough so that your urine is light yellow or clear like water. Choose water and other caffeine-free clear liquids until you feel better. If you have kidney, heart, or liver disease and have to limit fluids, talk with your doctor before you increase the amount of fluids you drink. · Take an over-the-counter pain medicine, such as acetaminophen (Tylenol), ibuprofen (Advil, Motrin), or naproxen (Aleve). Read and follow all instructions on the label. · Before you use cough and cold medicines, check the label.  These medicines may not be safe for young children or for people with certain health problems. · Be careful when taking over-the-counter cold or flu medicines and Tylenol at the same time. Many of these medicines have acetaminophen, which is Tylenol. Read the labels to make sure that you are not taking more than the recommended dose. Too much acetaminophen (Tylenol) can be harmful. · Get plenty of rest.  · Do not smoke or allow others to smoke around you. If you need help quitting, talk to your doctor about stop-smoking programs and medicines. These can increase your chances of quitting for good. When should you call for help? Call 911 anytime you think you may need emergency care. For example, call if:    · You have severe trouble breathing.    Call your doctor now or seek immediate medical care if:    · You seem to be getting much sicker.     · You have new or worse trouble breathing.     · You have a new or higher fever.     · You have a new rash.    Watch closely for changes in your health, and be sure to contact your doctor if:    · You have a new symptom, such as a sore throat, an earache, or sinus pain.     · You cough more deeply or more often, especially if you notice more mucus or a change in the color of your mucus.     · You do not get better as expected. Where can you learn more? Go to http://paola-shane.info/. Enter Z202 in the search box to learn more about \"Upper Respiratory Infection (Cold): Care Instructions. \"  Current as of: June 9, 2019  Content Version: 12.2  © 3542-1057 [employer]. Care instructions adapted under license by [employer] (which disclaims liability or warranty for this information). If you have questions about a medical condition or this instruction, always ask your healthcare professional. [place] [address] any warranty or liability for your use of this information.          Low Sodium Diet (2,000 Milligram): Care Instructions  Your Care Instructions    Too much sodium causes your body to hold on to extra water. This can raise your blood pressure and force your heart and kidneys to work harder. In very serious cases, this could cause you to be put in the hospital. It might even be life-threatening. By limiting sodium, you will feel better and lower your risk of serious problems. The most common source of sodium is salt. People get most of the salt in their diet from canned, prepared, and packaged foods. Fast food and restaurant meals also are very high in sodium. Your doctor will probably limit your sodium to less than 2,000 milligrams (mg) a day. This limit counts all the sodium in prepared and packaged foods and any salt you add to your food. Follow-up care is a key part of your treatment and safety. Be sure to make and go to all appointments, and call your doctor if you are having problems. It's also a good idea to know your test results and keep a list of the medicines you take. How can you care for yourself at home? Read food labels  · Read labels on cans and food packages. The labels tell you how much sodium is in each serving. Make sure that you look at the serving size. If you eat more than the serving size, you have eaten more sodium. · Food labels also tell you the Percent Daily Value for sodium. Choose products with low Percent Daily Values for sodium. · Be aware that sodium can come in forms other than salt, including monosodium glutamate (MSG), sodium citrate, and sodium bicarbonate (baking soda). MSG is often added to Asian food. When you eat out, you can sometimes ask for food without MSG or added salt. Buy low-sodium foods  · Buy foods that are labeled \"unsalted\" (no salt added), \"sodium-free\" (less than 5 mg of sodium per serving), or \"low-sodium\" (less than 140 mg of sodium per serving). Foods labeled \"reduced-sodium\" and \"light sodium\" may still have too much sodium.  Be sure to read the label to see how much sodium you are getting. · Buy fresh vegetables, or frozen vegetables without added sauces. Buy low-sodium versions of canned vegetables, soups, and other canned goods. Prepare low-sodium meals  · Cut back on the amount of salt you use in cooking. This will help you adjust to the taste. Do not add salt after cooking. One teaspoon of salt has about 2,300 mg of sodium. · Take the salt shaker off the table. · Flavor your food with garlic, lemon juice, onion, vinegar, herbs, and spices. Do not use soy sauce, lite soy sauce, steak sauce, onion salt, garlic salt, celery salt, mustard, or ketchup on your food. · Use low-sodium salad dressings, sauces, and ketchup. Or make your own salad dressings and sauces without adding salt. · Use less salt (or none) when recipes call for it. You can often use half the salt a recipe calls for without losing flavor. Other foods such as rice, pasta, and grains do not need added salt. · Rinse canned vegetables, and cook them in fresh water. This removes some--but not all--of the salt. · Avoid water that is naturally high in sodium or that has been treated with water softeners, which add sodium. Call your local water company to find out the sodium content of your water supply. If you buy bottled water, read the label and choose a sodium-free brand. Avoid high-sodium foods  · Avoid eating:  ? Smoked, cured, salted, and canned meat, fish, and poultry. ? Ham, corona, hot dogs, and luncheon meats. ? Regular, hard, and processed cheese and regular peanut butter. ? Crackers with salted tops, and other salted snack foods such as pretzels, chips, and salted popcorn. ? Frozen prepared meals, unless labeled low-sodium. ? Canned and dried soups, broths, and bouillon, unless labeled sodium-free or low-sodium. ? Canned vegetables, unless labeled sodium-free or low-sodium. ? Western Tamar fries, pizza, tacos, and other fast foods.   ? Pickles, olives, ketchup, and other condiments, especially soy sauce, unless labeled sodium-free or low-sodium. Where can you learn more? Go to http://paola-shane.info/. Enter L158 in the search box to learn more about \"Low Sodium Diet (2,000 Milligram): Care Instructions. \"  Current as of: November 7, 2018  Content Version: 12.2  © 8659-6776 [employer]. Care instructions adapted under license by [employer] (which disclaims liability or warranty for this information). If you have questions about a medical condition or this instruction, always ask your healthcare professional. [place] [address] any warranty or liability for your use of this information.

## 2019-12-18 NOTE — PROGRESS NOTES
Lanis Crigler is a 80 y.o. female and presents with Follow-up (Dementia and HTN) and Cough (pt daughter states that she would like her mom to have a chest xray, due to a constant coughand coughing up mucus)    Subjective:  Daughters are still concerned for pt memory. Unable to report to appt with Dr. Gopi Wilson as [facility] will not transport out of the city. Tried to attain a new referral, but having trouble, no appt yet. Continues TALKING to herself more, feels pt more forgetful lately. Taking Aricept with some improvement noted. Upper respiratory infection Review:  Lanis Crigler is a 80 y.o. female who complains of nasal congestion,sore throat, & productive cough for the past few days, improving since that time, but daughters concerned since she reported has sputum production now. She denies a history of shortness of breath and chest pain. Evaluation to date: none. Treatment to date: decongestants, antihistamines, cough suppressants, OTC products. Patient does not smoke cigarettes. Relevant PMH: No pertinent additional PMH. Hypertension Review:  The patient has hypertension. Had elevated BP at [facility] clinic ~1 month ago, 227/107 with repeat 214/80  Diet and Lifestyle: generally does try to follow a  low sodium diet, exercises rarely. Home BP Monitoring: is not measured at home. Pertinent ROS: taking medications as instructed, no medication side effects noted. No TIA's, chest pain on exertion, dyspnea on exertion, or swelling of ankles.    BP Readings from Last 3 Encounters:   12/18/19 152/66   10/18/19 148/82   10/16/19 130/80     Review of Systems  Constitutional: +breast cancer, followed by [facility] clinic. negative for fevers, chills, anorexia and weight loss  Respiratory:  negative for cough, hemoptysis, dyspnea, and wheezing  CV:   negative for chest pain, palpitations, and lower extremity edema  GI:   negative for nausea, vomiting, diarrhea, abdominal pain, and melena  Endo:               + thyroid dysfunction, followed by Dr. Dillon Doll. negative for polyuria,polydipsia,polyphagia, and heat intolerance  Genitourinary: negative for frequency, urgency, dysuria, retention, and hematuria  Integument:  negative for rash, ulcerations  Hematologic:  negative for easy bruising and bleeding  Musculoskel: negative for muscle weakness  Neurological:  negative for headaches, dizziness, vertigo,and memory problems  Behavl/Psych: negative for feelings of anxiety, depression, suicide, and mood changes    Past Medical History:   Diagnosis Date    Cancer ([facility] [address]     right breast.    Chronic pain     CVA     HTN (hypertension) 2/9/2011    Hypertension     Microscopic hematuria 5/11/2011    Other and unspecified hyperlipidemia 2/9/2011    Subclinical hyperthyroidism 8/29/2015    Venous thrombosis 2/17/2010    left arm DVT     Past Surgical History:   Procedure Laterality Date    BREAST SURGERY PROCEDURE UNLISTED      HX HEENT      HX HEMORRHOIDECTOMY       Social History     Socioeconomic History    Marital status: UNKNOWN     Spouse name: Not on file    Number of children: Not on file    Years of education: Not on file    Highest education level: Not on file   Tobacco Use    Smoking status: Former Smoker    Smokeless tobacco: Never Used    Tobacco comment: smoked for 10 yrs   Substance and Sexual Activity    Alcohol use: Yes     Alcohol/week: 0.8 standard drinks     Types: 1 Cans of beer per week     Comment: occ    Drug use: No    Sexual activity: Never     Family History   Problem Relation Age of Onset    Stroke Mother     Stroke Father     Diabetes Daughter      Current Outpatient Medications   Medication Sig Dispense Refill    cholecalciferol (VITAMIN D3) (1000 Units /25 mcg) tablet Take 2 Tabs by mouth daily. 180 Tab 3    amLODIPine (NORVASC) 5 mg tablet Take 1 Tab by mouth daily. 90 Tab 3    simvastatin (ZOCOR) 20 mg tablet Take 1 Tab by mouth nightly.  90 Tab 3    benzonatate (TESSALON PERLES) 100 mg capsule Take 1 Cap by mouth three (3) times daily as needed for Cough for up to 7 days. 15 Cap 0    donepezil (ARICEPT) 5 mg tablet Take 1 Tab by mouth nightly. 30 Tab 1    lisinopril (PRINIVIL, ZESTRIL) 20 mg tablet TAKE 1 TABLET BY MOUTH EVERY DAY 90 Tab 3    diclofenac (VOLTAREN) 1 % gel Apply 2 g to affected area every six (6) hours. 100 g 2    acetaminophen (TYLENOL ARTHRITIS PAIN) 650 mg TbER Take 1 Tab by mouth three (3) times daily as needed for Pain. Indications: Pain associated with Arthritis 100 Tab 3    multivitamin (ONE A DAY) tablet Take 1 Tab by mouth daily. Indications: Treatment To Prevent Vitamin Deficiency 90 Tab 3    metoprolol succinate (TOPROL-XL) 50 mg XL tablet Take 1 Tab by mouth daily. 90 Tab 3    methIMAzole (TAPAZOLE) 5 mg tablet Take 1 Tab by mouth every Monday, Wednesday, Friday. 30 Tab 11    [facility] 1 Each 0    gabapentin (NEURONTIN) 100 mg capsule TAKE 1 CAP BY MOUTH NIGHTLY AS NEEDED. 20 Cap 1    hydrocortisone (ANUSOL-HC) 2.5 % rectal cream Insert  into rectum four (4) times daily. 45 g 5    aspirin delayed-release 81 mg tablet Take 1 Tab by mouth daily. 90 Tab 3    lubiPROStone (AMITIZA) 24 mcg capsule Take 1 Cap by mouth two (2) times daily (with meals). Indications: chronic idiopathic constipation 60 Cap 5    naloxone (NARCAN) 4 mg/actuation nasal spray Use 1 spray into 1 nostril for OVERDOSE. Call 911. For subsequent doses, give in alternating nostrils. May repeat every 2 to 3 min. 2 Each 0    anastrozole (ARIMIDEX) 1 mg tablet TAKE 1 TABLET BY MOUTH DAILY  3    mometasone (ELOCON) 0.1 % topical cream Apply  to affected area daily. 15 g 1    ranitidine (ZANTAC) 150 mg tablet Take 1 Tab by mouth two (2) times daily as needed for Indigestion.  Take for acid reflux 60 Tab 11     No Known Allergies    Objective:  Visit Vitals  /66 (BP 1 Location: Right arm, BP Patient Position: Sitting)   Pulse 77   Temp 98.3 °F (36.8 °C) (Oral)   Resp 17   Ht 5' 1\" (1.549 m)   SpO2 99%   BMI 24.56 kg/m²     Wt Readings from Last 3 Encounters:   10/18/19 130 lb (59 kg)   06/17/19 128 lb (58.1 kg)   06/14/19 130 lb (59 kg)     Physical Exam:    General appearance - alert, well appearing, and in no distress. Seated comfortably in W/C. Mild urine odor noted, urinary incontinence presumed. Mental status - A/O x 2 (person, place). normal mood and affect. Neck - No JVD. Chest - CTA. Symmetric chest rise. No wheezing, rales or rhonchi. Heart - Normal rate, regular rhythm. Normal S1, S2. No MGR or clicks. Ext- Radial, DP pulses, 2+ bilaterally. No pedal edema, clubbing, or cyanosis. Skin-Warm and dry. No clubbing or cyanosis. Hyperpigmentation to multiple nevi and skin tags. Neuro - Normal speech, no focal findings or movement disorder. Normal strength, gait, and muscle tone. Assessment/Plan:  Asked to reschedule appt with Dr. Martha Pham and seek alternate transportation arrangements. Tessalon perles added, but deferred CXR. Clear BS, no CP, fever, or SOB reported for symptoms x 1 week. Advised to return for any forementioned symptoms or continued productive cough after 1-2 more weeks for CXR. Medication Side Effects and Warnings were discussed with patient: yes   Patient Labs were reviewed: yes  Patient Past Records were reviewed: yes    See below for other orders   Follow-up and Dispositions    · Return in about 3 months (around 3/18/2020) for dementia f/u, HTN med resume. ICD-10-CM ICD-9-CM    1. Acute URI J06.9 465.9 benzonatate (TESSALON PERLES) 100 mg capsule   2. Essential hypertension I10 401.9 amLODIPine (NORVASC) 5 mg tablet   3. Mixed hyperlipidemia E78.2 272.2 simvastatin (ZOCOR) 20 mg tablet     Orders Placed This Encounter    cholecalciferol (VITAMIN D3) (1000 Units /25 mcg) tablet     Sig: Take 2 Tabs by mouth daily.      Dispense:  180 Tab     Refill:  3    amLODIPine (NORVASC) 5 mg tablet     Sig: Take 1 Tab by mouth daily. Dispense:  90 Tab     Refill:  3    simvastatin (ZOCOR) 20 mg tablet     Sig: Take 1 Tab by mouth nightly. Dispense:  90 Tab     Refill:  3    benzonatate (TESSALON PERLES) 100 mg capsule     Sig: Take 1 Cap by mouth three (3) times daily as needed for Cough for up to 7 days. Dispense:  15 Cap     Refill:  0       Alfred Doll expressed understanding of plan. An After Visit Summary was offered/printed and given to the patient. The patient's abnormal BMI was reviewed and deemed target BMI for the patient. An After Visit Summary was provided to the patient and/or caregiver.

## 2019-12-18 NOTE — PROGRESS NOTES
Pt is here for   Chief Complaint   Patient presents with    Follow-up     Dementia and HTN    Cough     pt daughter states that she would like her mom to have a chest xray, due to a constant coughand coughing up mucus     Pt denies pain at this time    1. Have you been to the ER, urgent care clinic since your last visit? Hospitalized since your last visit? No    2. Have you seen or consulted any other health care providers outside of the [address] [place] [address] since your last visit? Include any pap smears or colon screening.  No

## 2019-12-20 ENCOUNTER — DOCUMENTATION ONLY (OUTPATIENT)
Dept: INTERNAL MEDICINE CLINIC | Age: 84
End: 2019-12-20

## 2020-02-11 ENCOUNTER — OFFICE VISIT (OUTPATIENT)
Dept: NEUROLOGY | Age: 85
End: 2020-02-11

## 2020-02-11 VITALS
SYSTOLIC BLOOD PRESSURE: 160 MMHG | RESPIRATION RATE: 16 BRPM | HEART RATE: 69 BPM | OXYGEN SATURATION: 98 % | HEIGHT: 61 IN | DIASTOLIC BLOOD PRESSURE: 80 MMHG | WEIGHT: 130 LBS | BODY MASS INDEX: 24.55 KG/M2

## 2020-02-11 DIAGNOSIS — R41.3 MEMORY LOSS: Primary | ICD-10-CM

## 2020-02-11 NOTE — PATIENT INSTRUCTIONS
MRI of the Head: About This Test  What is it? MRI (magnetic resonance imaging) is a test that uses a magnetic field and pulses of radio wave energy to make pictures of the organs and structures inside the body. An MRI of the head can give your doctor information about your brain, eyes, ears, and nerves. When you have an MRI, you lie on a table and the table moves into the MRI machine. Why is this test done? An MRI of the head can help find problems such as tumors and infection. It also can check symptoms of a head injury or of diseases such as multiple sclerosis (MS) and stroke. How can you prepare for the test?  Talk to your doctor about all your health conditions before the test. For example, tell your doctor if:  · You are allergic to any medicines. · You are or might be pregnant. · You have a pacemaker, an artificial limb, any metal pins or metal parts in your body, metal heart valves, metal clips in your brain, metal implants in your ears, or any other implanted or prosthetic medical device. · You have an intrauterine device (IUD) in place. · You get nervous in confined spaces. You may need medicine to help you relax. · You wear a patch that contains medicine. · You have kidney disease. What happens before the test?  · You will remove all metal objects, such as hearing aids, dentures, jewelry, watches, and hairpins. · You may need to take off some of your clothes. You will be given a gown to wear during the test. If you do leave some clothes on, make sure you take everything out of your pockets. · You may have contrast material (dye) put into your arm through a tube called an IV. Contrast material helps doctors see specific organs, blood vessels, and most tumors. What happens during the test?  · You will lie on your back on a table that is part of the MRI scanner. Your head, chest, and arms may be held with straps to help you lie still.   · The table will slide into the space that contains the magnet. A device called a coil may be placed over or wrapped around your head. · Inside the scanner you will hear a fan and feel air moving. You may hear tapping, thumping, or snapping noises. You may be given earplugs or headphones to reduce the noise. · You will be asked to hold still during the scan. You may be asked to hold your breath for short periods. · You may be alone in the scanning room, but a technologist will be watching you through a window and talking with you during the test.  What else should you know about the test?  · An MRI does not hurt. · You may feel warmth in the area being examined. This is normal.  · A dye (contrast material) that contains gadolinium may be used in this test. Be sure to tell your doctor if:  ? You are pregnant or think you may be pregnant. ? You have kidney problems. ? You've had more than one test that used gadolinium. · The U.S. Food and Drug Administration (FDA) has safety warnings about gadolinium. But for most people, the benefit of its use in this test outweighs the risk. · If a dye is used, you may feel a quick sting or pinch and some coolness when the IV is started. The dye may give you a metallic taste in your mouth. Some people feel sick to their stomach or get a headache. · If you breastfeed and are concerned about whether the dye used in this test is safe, talk to your doctor. Most experts believe that very little dye passes into breast milk and even less is passed on to the baby. But if you prefer, you can store some of your breast milk ahead of time and use it for a day or two after the test.  How long does the test take? · The test usually takes 30 to 60 minutes but can take as long as 2 hours. What happens after the test?  · You will probably be able to go home right away, depending on the reason for the test.  · You can go back to your usual activities right away. Follow-up care is a key part of your treatment and safety.  Be sure to make and go to all appointments, and call your doctor if you are having problems. It's also a good idea to keep a list of the medicines you take. Ask your doctor when you can expect to have your test results. Where can you learn more? Go to http://paola-shane.info/. Enter Z391 in the search box to learn more about \"MRI of the Head: About This Test.\"  Current as of: March 28, 2019  Content Version: 12.2  © 1762-2169 [employer]. Care instructions adapted under license by [employer] (which disclaims liability or warranty for this information). If you have questions about a medical condition or this instruction, always ask your healthcare professional. [address] any warranty or liability for your use of this information.

## 2020-02-11 NOTE — PROGRESS NOTES
Chief Complaint   Patient presents with    Dementia       Referred by: DOROTA Rubin      [facility] [place]    Ms. Leigh Ann Mendez is an 77-year-old woman with a history of hypertension, stroke here for memory changes. 1 of her daughters is present. Ms. Leigh Ann Mendez is a poor historian. She does not know why she is here. Family is concerned that over the past year and several months her memory is getting worse such that she cannot remember conversations. She gets lost easily after being given directions. She gets angry easily. She needs medication supervision because she forgets her medications. She lives with family. She does not drive. Ms. Leigh Ann Mendez denies any hallucinations. She has 1/9 grade education. She can do her own ADLs she tells me such as bathing, dressing, feeding. She spends most of the day sedentary but does walk with her walker sometimes. Recent blood work showing normal B12 and basic labs. Head CT unrevealing. Review of Systems   Unable to perform ROS: Mental acuity   Psychiatric/Behavioral: Positive for memory loss.        Past Medical History:   Diagnosis Date    Cancer [facility])     right breast.    Chronic pain     CVA     HTN (hypertension) 2/9/2011    Hypertension     Microscopic hematuria 5/11/2011    Other and unspecified hyperlipidemia 2/9/2011    Subclinical hyperthyroidism 8/29/2015    Venous thrombosis 2/17/2010    left arm DVT     Family History   Problem Relation Age of Onset    Stroke Mother     Stroke Father     Diabetes Daughter      Social History     Socioeconomic History    Marital status:      Spouse name: Not on file    Number of children: Not on file    Years of education: Not on file    Highest education level: Not on file   Occupational History    Not on file   Social Needs    Financial resource strain: Not on file    Food insecurity:     Worry: Not on file     Inability: Not on file    Transportation needs:     Medical: Not on file     Non-medical: Not on file Tobacco Use    Smoking status: Former Smoker    Smokeless tobacco: Never Used    Tobacco comment: smoked for 10 yrs   Substance and Sexual Activity    Alcohol use: Yes     Alcohol/week: 0.8 standard drinks     Types: 1 Cans of beer per week     Comment: occ    Drug use: No    Sexual activity: Never   Lifestyle    Physical activity:     Days per week: Not on file     Minutes per session: Not on file    Stress: Not on file   Relationships    Social connections:     Talks on phone: Not on file     Gets together: Not on file     Attends Voodoo service: Not on file     Active member of club or organization: Not on file     Attends meetings of clubs or organizations: Not on file     Relationship status: Not on file    Intimate partner violence:     Fear of current or ex partner: Not on file     Emotionally abused: Not on file     Physically abused: Not on file     Forced sexual activity: Not on file   Other Topics Concern    Not on file   Social History Narrative    Not on file     Current Outpatient Medications   Medication Sig    donepezil (ARICEPT) 5 mg tablet TAKE 1 TABLET BY MOUTH EVERY DAY AT NIGHT    cholecalciferol (VITAMIN D3) (1000 Units /25 mcg) tablet Take 2 Tabs by mouth daily.  amLODIPine (NORVASC) 5 mg tablet Take 1 Tab by mouth daily.  simvastatin (ZOCOR) 20 mg tablet Take 1 Tab by mouth nightly.  lisinopril (PRINIVIL, ZESTRIL) 20 mg tablet TAKE 1 TABLET BY MOUTH EVERY DAY    lubiPROStone (AMITIZA) 24 mcg capsule Take 1 Cap by mouth two (2) times daily (with meals). Indications: chronic idiopathic constipation    diclofenac (VOLTAREN) 1 % gel Apply 2 g to affected area every six (6) hours.  acetaminophen (TYLENOL ARTHRITIS PAIN) 650 mg TbER Take 1 Tab by mouth three (3) times daily as needed for Pain. Indications: Pain associated with Arthritis    multivitamin (ONE A DAY) tablet Take 1 Tab by mouth daily.  Indications: Treatment To Prevent Vitamin Deficiency    metoprolol succinate (TOPROL-XL) 50 mg XL tablet Take 1 Tab by mouth daily.  methIMAzole (TAPAZOLE) 5 mg tablet Take 1 Tab by mouth every Monday, Wednesday, Friday.  miscell[facility]NFOCUS medical supply [facility] [place] bed.  naloxone (NARCAN) 4 mg/actuation nasal spray Use 1 spray into 1 nostril for OVERDOSE. Call 911. For subsequent doses, give in alternating nostrils. May repeat every 2 to 3 min.  anastrozole (ARIMIDEX) 1 mg tablet TAKE 1 TABLET BY MOUTH DAILY    mometasone (ELOCON) 0.1 % topical cream Apply  to affected area daily.  hydrocortisone (ANUSOL-HC) 2.5 % rectal cream Insert  into rectum four (4) times daily.  aspirin delayed-release 81 mg tablet Take 1 Tab by mouth daily.  ranitidine (ZANTAC) 150 mg tablet Take 1 Tab by mouth two (2) times daily as needed for Indigestion. Take for acid reflux    gabapentin (NEURONTIN) 100 mg capsule TAKE 1 CAP BY MOUTH NIGHTLY AS NEEDED. No current facility-administered medications for this visit. No Known Allergies      Neurologic Exam     Mental Status   Level of consciousness: alert  Elderly woman talkative. Disoriented to the month and year and the date and situation. She can follow commands well. Cranial Nerves   Cranial nerves II through XII intact. Edentulous     Motor Exam   Muscle bulk: decreased    Strength   Strength 5/5 throughout. Good strength throughout symmetric     Sensory Exam   Light touch normal.     Gait, Coordination, and Reflexes     Gait  Gait: (Deferred-no walker and she does not want to walk without)    Tremor   Resting tremor: absent    Physical Exam   Constitutional: She appears well-developed and well-nourished. Cardiovascular: Normal rate. Pulmonary/Chest: Effort normal.   Neurological: She has normal strength. Skin: Skin is warm and dry. Psychiatric: Her behavior is normal.   Vitals reviewed.     Visit Vitals  /80 (BP 1 Location: Left arm, BP Patient Position: Sitting)   Pulse 69   Resp 16   Ht 5' 1\" (1.549 m)   Wt 59 kg (130 lb)   SpO2 98%   BMI 24.56 kg/m²       Lab Results   Component Value Date/Time    WBC 5.7 08/09/2018 11:14 AM    WBC (POC) 6.2 02/13/2019 12:53 PM    HGB 11.1 08/09/2018 11:14 AM    Hemoglobin (POC) 14.6 04/03/2011 02:12 PM    HGB (POC) 11.3 02/13/2019 12:53 PM    HCT 34.3 08/09/2018 11:14 AM    Hematocrit (POC) 43 04/03/2011 02:12 PM    HCT (POC) 32.9 (A) 02/13/2019 12:53 PM    PLATELET 085 36/46/7597 11:14 AM    PLATELET (POC) 902 89/54/2900 12:53 PM     (H) 08/09/2018 11:14 AM    MCV (POC) 100.5 (A) 02/13/2019 12:53 PM     Lab Results   Component Value Date/Time    Glucose 134 (H) 06/17/2019 01:08 PM    Glucose (POC) 98 04/03/2011 02:12 PM    Glucose  05/11/2011 10:01 AM    LDL, calculated 74 08/09/2018 11:14 AM    Creatinine (POC) 0.8 04/03/2011 02:12 PM    Creatinine 0.75 06/17/2019 01:08 PM      Lab Results   Component Value Date/Time    Cholesterol, total 139 08/09/2018 11:14 AM    Cholesterol (POC) 163 02/13/2019 12:53 PM    HDL Cholesterol 55 08/09/2018 11:14 AM    LDL, calculated 74 08/09/2018 11:14 AM    LDL Cholesterol (POC) 72 02/13/2019 12:53 PM    Triglyceride 48 08/09/2018 11:14 AM    Triglycerides (POC) 61 02/13/2019 12:53 PM     Lab Results   Component Value Date/Time    ALT (SGPT) 9 02/13/2019 12:32 PM    AST (SGOT) 15 02/13/2019 12:32 PM    Alk. phosphatase 87 02/13/2019 12:32 PM    Bilirubin, total 0.4 02/13/2019 12:32 PM    Albumin 4.3 02/13/2019 12:32 PM    Protein, total 7.8 02/13/2019 12:32 PM    INR 1.0 09/16/2015 09:23 AM    Prothrombin time 10.0 09/16/2015 09:23 AM    PLATELET 431 26/23/2407 11:14 AM    PLATELET (POC) 920 06/20/4119 12:53 PM        CT Results (maximum last 3): Results from [place] encounter on 09/16/15   CT HEAD WO CONT    Narrative **Final Report**       ICD Codes / Adm. Diagnosis: 548822  724.1 / Extremity Weakness  possible   strock  Examination:  CT HEAD WO CON  - [id number] - Sep 16 2015  9:44AM  Accession No: [id number]  Reason:  stroke      REPORT:  INDICATION: Stroke. Left extremity weakness. COMPARISON: January 5, 2012    FINDINGS: 5 mm axial images were obtained from the skull base through the   vertex. The ventricles and cortical sulci are stable in size and   configuration. There is no evidence of intracranial hemorrhage, mass, mass   effect, or acute infarct. No extra-axial fluid collections are seen. Tiny   lipoma along the anterior falx is unchanged. The visualized paranasal   sinuses and mastoid air cells are clear. The orbital structures are   unremarkable. No osseous abnormalities are seen. IMPRESSION: No acute intracranial process. No change from the prior study. Signing/Reading Doctor: Sylvie Tyler ([id number])    Approved: Sylvie Tyler ([id number])  Sep 16 2015 10:02AM                                 Results from Hospital Encounter encounter on 01/05/12   CT HEAD WITHOUT CONTRAST    Narrative **Final Report**       ICD Codes / Adm. Diagnosis: 535899  401.9 / Hypertension    Examination:  CT HEAD  CON  - [id number] - Jan 5 2012  2:46PM  Accession No:  [id number]  Reason:  stroke      REPORT:  INDICATION:  CVA. COMPARISON:  None. TECHNIQUE:  Unenhanced CT of the head was performed using 5 mm images. Brain   and bone windows were generated. FINDINGS:  The ventricles are within normal limits of size. There is no intracranial   hemorrhage, extra-axial collection, mass, mass effect or midline shift. The   basilar cisterns are open. No acute infarct is identified. Mild mucosal thickening of the right sphenoid sinus and right posterior   ethmoid air cell is noted. The mastoid air cells are clear. IMPRESSION:      No intracranial bleed or shift of the midline.                 Signing/Reading Doctor: Reynaldo Kam ([id number])    Approved: Reynaldo Kam ([id number])  01/05/2012                                        Assessment and Plan   Diagnoses and all orders for this visit: 1. Memory loss  -     REFERRAL TO NEUROPSYCHOLOGY  -     MRI BRAIN WO CONT; Future      80year-old woman with concerns for early dementia or progressive memory loss. ADLs seem intact which family corroborates to me. This could be age-related decline. I am going to check an MRI and formal neuropsychological testing. She does have stroke risk factors and given her history of stroke she should be on a daily baby aspirin which she is not currently. I need her to be more physically active. She has good strength in her extremities and needs to be walking more. Watch for mood changes. I would like to see her after the neuropsych testing is done. I reviewed and decided to continue the current medications. This clinical note was dictated with an electronic dictation software that can make unintentional errors. If there are any questions, please contact me directly for clarification. A notice of this visit/encounter being completed has been sent electronically to the patient's PCP and/or referring provider.      [address] [facility], [facility] [address]  Diplomate EMMETT

## 2020-02-13 NOTE — PROGRESS NOTES
Please tell them to see DR. BATISTA, like I referred. Not sure HOW she ended up see neuro for this, I referred her to Dr. Hammad Krishnamurthy, who performs the testing I want.

## 2020-03-10 ENCOUNTER — HOSPITAL ENCOUNTER (OUTPATIENT)
Dept: MRI IMAGING | Age: 85
Discharge: HOME | End: 2020-03-10
Attending: PSYCHIATRY & NEUROLOGY
Payer: MEDICARE

## 2020-03-10 DIAGNOSIS — R41.3 MEMORY LOSS: ICD-10-CM

## 2020-03-10 PROCEDURE — 70551 MRI BRAIN STEM W/O DYE: CPT

## 2020-03-16 ENCOUNTER — TELEPHONE (OUTPATIENT)
Dept: NEUROLOGY | Age: 85
End: 2020-03-16

## 2020-03-16 DIAGNOSIS — G31.84 MCI (MILD COGNITIVE IMPAIRMENT): ICD-10-CM

## 2020-03-16 RX ORDER — DONEPEZIL HYDROCHLORIDE 10 MG/1
10 TABLET, FILM COATED ORAL
Qty: 90 TAB | Refills: 1 | Status: SHIPPED | OUTPATIENT
Start: 2020-03-16 | End: 2020-08-17 | Stop reason: SDUPTHER

## 2020-03-17 ENCOUNTER — TELEPHONE (OUTPATIENT)
Dept: NEUROLOGY | Age: 85
End: 2020-03-17

## 2020-03-17 ENCOUNTER — TELEPHONE (OUTPATIENT)
Dept: INTERNAL MEDICINE CLINIC | Age: 85
End: 2020-03-17

## 2020-03-17 NOTE — TELEPHONE ENCOUNTER
Pt was called and her daughter Dileep French states that the niece is going to take Ms.  Mal Herman to Dr. Titi Crowell' office for the appt in April

## 2020-03-17 NOTE — TELEPHONE ENCOUNTER
Called back and grand daughter said that she would be able to bring pt to appt so schedule appt for pt.

## 2020-03-17 NOTE — TELEPHONE ENCOUNTER
----- Message from Rosa Domínguez sent at 3/16/2020  3:41 PM EDT -----  Regarding: dr cabrera/ telephone  General Message/Vendor Calls    Caller's first and last name: Lata Rojas ([facility],[address] [place])      Reason for call: to schedule an appt       Callback required yes/no and why: yes      Best contact number(s): [phone]      Details to clarify the request:      Rosa Domínguez

## 2020-03-17 NOTE — TELEPHONE ENCOUNTER
Called sister back and she said that pt transportation does not bring pt to our location. Advised them to reach out to referring dr and see if they can refer elsewhere.

## 2020-03-17 NOTE — TELEPHONE ENCOUNTER
----- Message from Iris Ordoñez sent at 3/17/2020  1:07 PM EDT -----  Regarding: Dr. Shannon Jacques first and last name:Talita lopez, granddaughter      Reason for call: NEW PATIENT APPT       Callback required yes/no and why: yes to be scheduled       Best contact number(s): [phone]      Details to clarify the request: Troy Ray, Granddaughter, would like a call back to schedule Pt for a NEW PATIENT appt referred by Alex Griffiths for possible Alzheimer's disease. Pt has already taken the Mri that will reflect this.     Iris Ordoñez

## 2020-03-17 NOTE — TELEPHONE ENCOUNTER
Sorry to hear that. I don't have an alternate option. They can see if the neuropsych, that neuro referred them too is closer. Or figure out a way to get to this appt.

## 2020-03-17 NOTE — TELEPHONE ENCOUNTER
Franky Gardner referred pt to Dr. Ni Dominguez. The Ebony Ponce does not go to [facility]. Where else can she go?   Pt # [phone]

## 2020-03-23 ENCOUNTER — PATIENT OUTREACH (OUTPATIENT)
Dept: INTERNAL MEDICINE CLINIC | Age: 85
End: 2020-03-23

## 2020-03-24 ENCOUNTER — PATIENT OUTREACH (OUTPATIENT)
Dept: INTERNAL MEDICINE CLINIC | Age: 85
End: 2020-03-24

## 2020-03-24 ENCOUNTER — TELEPHONE (OUTPATIENT)
Dept: INTERNAL MEDICINE CLINIC | Age: 85
End: 2020-03-24

## 2020-03-24 NOTE — TELEPHONE ENCOUNTER
May use Phenylephrine OR Chlor-Trimetron; & Claritin, Allegra,or xyzal for symptoms.  I would try the LATTER first.

## 2020-03-24 NOTE — TELEPHONE ENCOUNTER
Pt's daughter called stating her mother is  Coughing up a lot of phlegm and has nasal congestion. She wants to know what  They can do for her please.     Pt # [phone]

## 2020-03-24 NOTE — PROGRESS NOTES
Social Work Note  3/24/2020      Date of referral:  03/17/20  Referral received from: John Spencer NP  Reason for referral:   Neuropsych testing/assistance in the home    Previous  Referral:  no     Support System: Patient lives with her daughter, Luisa Bradley, who is her primary caregiver. Luisa Bradley is currently receiving rehab following health concerns so daughter, Di Joseph is currently staying with patient in the home 24/7. Patient also has a son. Community Providers:   None at this time    Transportation:  Family provides. Patient is registered with [employer] which transport only in the [facility] [place] and [facility]    Medication:  Family administers    Financial Concern:   Patient previously applied for [place] and was denied. Patient may be eligible for Medicaid now due to need for personal care services. Advance Care Plan:  Advance directives not discussed. Per Luisa Bradley, patient does not have a POA, but she has been discussing this need with siblings. Received call from patient's primary caregiver, Luisa Bradley and full assessment was completed. Patient lives with Luisa Bradley and now requires supervision and assistance with ADLs. Daughter stated that her mother can bathe with assistance, uses a wheelchair for ambulation, and can transfer in/out of her hospital bed. Per daughter, patient no longer cooks without supervision and family must monitor patient because she will obtain mail and then hide it. Spoke with Luisa Bradley about need for assistance with caregiver when she returns home from rehab. Daughter stated that she worries that patient will fall because she is unable to get her off the floor. Discussed option of applying for Medicaid and having patient screened for personal care services. Daughter reported that patient previously applied for Medicaid but was denied.   Explained that when a person needs personal care, then the Medicaid application income limits are different. Discussed Medicaid application process and need for [employer] screening (tool to determine care needs). Ed Garcia stated that she will discuss option with her siblings. SW to follow up re: family decision. Reviewed appointment date for patient with Dr. Ron Barr. Daughter advised that papers sent from Dr. Ron Barr have been misplaced. Daughter voiced her mother may have hidden them. Provided phone number to Dr. Ron Barr for follow up call to obtain new copy. Patient's niece will take her to appointment in April. Identified Needs:     Personal care Services   Medicaid    Social Work Plan:     Will follow up with Ed Garcia re: family decision about care   Will assist with Medicaid application and personal care screening referral as needed      Goals Addressed                 This Visit's Progress       Chronic Disease     Additional assistance with care in the home        Goal:  Patient and family will demonstrate understanding of options available for additional care in the home and will apply for such services    03/24/20  Assessment:  Patient currently requires 24 hour care due to dementia. Primary caregiver (daughter, Jagdish Lara) is currently out of the house receiving rehab following an illness. Other children/family are assisting with care. Discussed option of applying for Medicaid for personal care services. Ed Koenigwillem to discuss with family. SW Plan:  · Follow up with Ed Garcia after she discussed options with family.   JA Monahan, MSW, ACSW, [facility]    [facility] Management Team  [phone]

## 2020-04-01 ENCOUNTER — TELEPHONE (OUTPATIENT)
Dept: NEUROLOGY | Age: 85
End: 2020-04-01

## 2020-04-01 NOTE — TELEPHONE ENCOUNTER
----- Message from Ryan Ortiz sent at 4/1/2020 11:29 AM EDT -----  Regarding: Dr. Josh Duncan request for a call back from the practice in regards to rescheduling the pt's appt with Dr. Michael Brandon. Best contact number is [phone].

## 2020-04-28 ENCOUNTER — VIRTUAL VISIT (OUTPATIENT)
Dept: INTERNAL MEDICINE CLINIC | Age: 85
End: 2020-04-28

## 2020-04-28 DIAGNOSIS — G31.84 MCI (MILD COGNITIVE IMPAIRMENT): Primary | ICD-10-CM

## 2020-04-28 DIAGNOSIS — J30.89 ENVIRONMENTAL AND SEASONAL ALLERGIES: ICD-10-CM

## 2020-04-28 DIAGNOSIS — I10 ESSENTIAL HYPERTENSION: ICD-10-CM

## 2020-04-28 RX ORDER — TRAMADOL HYDROCHLORIDE 50 MG/1
TABLET ORAL
COMMUNITY
Start: 2019-04-29 | End: 2020-07-28 | Stop reason: ALTCHOICE

## 2020-04-28 NOTE — PROGRESS NOTES
Pt is here for   Chief Complaint   Patient presents with    Follow-up     dementia, med start, HTN     Pt denies pain at this time    1. Have you been to the ER, urgent care clinic since your last visit? Hospitalized since your last visit? No    2. Have you seen or consulted any other health care providers outside of the [address] [place] [address] since your last visit? Include any pap smears or colon screening.  No

## 2020-04-28 NOTE — PROGRESS NOTES
Anatoliy Puckett is a 80 y.o. female evaluated via telephone on 4/28/2020. Consent:  She and/or health care decision maker is aware that she may receive a bill for this telephone service, depending on her insurance coverage, and has provided verbal consent to proceed: Yes      Documentation:  I communicated with the patient and/or health care decision maker about dementia follow up and HTN. Daughter reports she misses meds at time. Reportedly takes blood pressure, but not lately, since kit misplaced. Lastly daughter reports pt with cough and concerned since she is not wearing a mask. Use of claritin reportedly causes drowsiness. However, pt reports coughing seldomly. Details of this discussion including any medical advice provided: proper coughing hygeine and social distancing, reminder of taking meds in the evening if missed earlier in the day. Home blood pressure monitoring. Keeping f/u appt with Dr. Tammi Dunham as planned. I affirm this is a Patient Initiated Episode with an Established Patient who has not had a related appointment within my department in the past 7 days or scheduled within the next 24 hours.     Total Time: minutes: 11-20 minutes    Note: not billable if this call serves to triage the patient into an appointment for the relevant concern      Edilia Mandujano NP

## 2020-04-28 NOTE — PATIENT INSTRUCTIONS
May use Phenylephrine, Chlor-Trimetron, Claritin, Allegra, xyzal, or Zyrtec also for symptoms. Allergies: Care Instructions Your Care Instructions Allergies occur when your body's defense system (immune system) overreacts to certain substances. The immune system treats a harmless substance as if it were a harmful germ or virus. Many things can cause this overreaction, including pollens, medicine, food, dust, animal dander, and mold. Allergies can be mild or severe. Mild allergies can be managed with home treatment. But medicine may be needed to prevent problems. Managing your allergies is an important part of staying healthy. Your doctor may suggest that you have allergy testing to help find out what is causing your allergies. When you know what things trigger your symptoms, you can avoid them. This can prevent allergy symptoms and other health problems. For severe allergies that cause reactions that affect your whole body (anaphylactic reactions), your doctor may prescribe a shot of epinephrine to carry with you in case you have a severe reaction. Learn how to give yourself the shot and keep it with you at all times. Make sure it is not . Follow-up care is a key part of your treatment and safety. Be sure to make and go to all appointments, and call your doctor if you are having problems. It's also a good idea to know your test results and keep a list of the medicines you take. How can you care for yourself at home? · If you have been told by your doctor that dust or dust mites are causing your allergy, decrease the dust around your bed: 
? Wash sheets, pillowcases, and other bedding in hot water every week. ? Use dust-proof covers for pillows, duvets, and mattresses. Avoid plastic covers because they tear easily and do not \"breathe. \" Wash as instructed on the label. ? Do not use any blankets and pillows that you do not need. ? Use blankets that you can wash in your washing machine. ? Consider removing drapes and carpets, which attract and hold dust, from your bedroom. · If you are allergic to house dust and mites, do not use home humidifiers. Your doctor can suggest ways you can control dust and mites. · Look for signs of cockroaches. Cockroaches cause allergic reactions. Use cockroach baits to get rid of them. Then, clean your home well. Cockroaches like areas where grocery bags, newspapers, empty bottles, or cardboard boxes are stored. Do not keep these inside your home, and keep trash and food containers sealed. Seal off any spots where cockroaches might enter your home. · If you are allergic to mold, get rid of furniture, rugs, and drapes that smell musty. Check for mold in the bathroom. · If you are allergic to outdoor pollen or mold spores, use air-conditioning. Change or clean all filters every month. Keep windows closed. · If you are allergic to pollen, stay inside when pollen counts are high. Use a vacuum  with a HEPA filter or a double-thickness filter at least two times each week. · Stay inside when air pollution is bad. Avoid paint fumes, perfumes, and other strong odors. · Avoid conditions that make your allergies worse. Stay away from smoke. Do not smoke or let anyone else smoke in your house. Do not use fireplaces or wood-burning stoves. · If you are allergic to your pets, change the air filter in your furnace every month. Use high-efficiency filters. · If you are allergic to pet dander, keep pets outside or out of your bedroom. Old carpet and cloth furniture can hold a lot of animal dander. You may need to replace them. When should you call for help? Give an epinephrine shot if: 
  · You think you are having a severe allergic reaction.  
  · You have symptoms in more than one body area, such as mild nausea and an itchy mouth.  
 After giving an epinephrine shot call  911, even if you feel better. 
 Call 911 if:   · You have symptoms of a severe allergic reaction. These may include: 
? Sudden raised, red areas (hives) all over your body. ? Swelling of the throat, mouth, lips, or tongue. ? Trouble breathing. ? Passing out (losing consciousness). Or you may feel very lightheaded or suddenly feel weak, confused, or restless.  
  · You have been given an epinephrine shot, even if you feel better.  
 Call your doctor now or seek immediate medical care if: 
  · You have symptoms of an allergic reaction, such as: ? A rash or hives (raised, red areas on the skin). ? Itching. ? Swelling. ? Belly pain, nausea, or vomiting.  
 Watch closely for changes in your health, and be sure to contact your doctor if: 
  · You do not get better as expected. Where can you learn more? Go to http://paola-shane.info/ Enter C930 in the search box to learn more about \"Allergies: Care Instructions. \" Current as of: October 6, 2019Content Version: 12.4 © 7856-0545 [employer]. Care instructions adapted under license by [employer] (which disclaims liability or warranty for this information). If you have questions about a medical condition or this instruction, always ask your healthcare professional. [address] any warranty or liability for your use of this information.

## 2020-04-30 ENCOUNTER — TELEPHONE (OUTPATIENT)
Dept: INTERNAL MEDICINE CLINIC | Age: 85
End: 2020-04-30

## 2020-04-30 NOTE — TELEPHONE ENCOUNTER
Herb Coley from [facility] sent called to give readings on pt BP, Her BP was 230/92. Ms Sallie Bonds had not taken her medications, and was refusing tto do so. Herb Coley was able to get her to take meds. Asked daughter to call back later today with BP reading.  Pt also had danita's last night for dinner and coffee this morning with breakfast.

## 2020-05-09 RX ORDER — METOPROLOL SUCCINATE 50 MG/1
TABLET, EXTENDED RELEASE ORAL
Qty: 90 TAB | Refills: 3 | Status: SHIPPED | OUTPATIENT
Start: 2020-05-09 | End: 2021-05-21 | Stop reason: SDUPTHER

## 2020-06-03 ENCOUNTER — TELEPHONE (OUTPATIENT)
Dept: NEUROLOGY | Age: 85
End: 2020-06-03

## 2020-06-03 NOTE — TELEPHONE ENCOUNTER
----- Message from Nievesbairon Danay sent at 6/3/2020  1:22 PM EDT -----  Regarding: /telephone  General Message/Vendor Calls    Caller's first and last name:      Reason for call: The pt's daughter would like to know is the pt supposed to come in the office or will it be a virtual visit. She also has a question about a referral.      Callback required yes/no and why:yes      Best contact number(s):[phone], [phone].       Details to clarify the request:

## 2020-06-04 ENCOUNTER — VIRTUAL VISIT (OUTPATIENT)
Dept: NEUROLOGY | Age: 85
End: 2020-06-04

## 2020-06-18 ENCOUNTER — PATIENT OUTREACH (OUTPATIENT)
Dept: INTERNAL MEDICINE CLINIC | Age: 85
End: 2020-06-18

## 2020-06-18 NOTE — PROGRESS NOTES
Social Work Note  6/18/2020      Follow up call to patient's daughter, Alie Ludwig. She stated that her mother has \"good days and bad days\" and daughter is still recuperating from her own illness. Family is providing assistance with care and supervision. Patient's daughter stated that her mother can wash and dress herself and her sister assists at different times with other ADLs. Patient's daughter stated that she manages finances and provides supervision while her brother assists with laundry. Family and friends brings meals. Discussed option of personal care services and applying for Medicaid. Per daughter, patient is ineligible for Medicaid and assets are over $2000. Asked Alie Ludwig about respite options for herself. She stated that she has difficulty going out, but that her mother also needs supervision. Advised of [employer] respite care arnold and that SW can help apply if interested. Also provided number to [employer] 24/7 helpline for support and resources (413-235-8958). Advised that they provide support for patients with any type of memory concerns. No immediate needs voiced by Alie Ludwig. Advised that SW is available as things change and if additional care is needed. Contact information provided to daughter for future questions or concerns. Patient has graduated from the Complex Case Management  program on 06/18/20. Patient/family has the ability to self-manage. At this time, social work goals have been completed and no further social work follow-up is scheduled. Goals Addressed                 This Visit's Progress       Chronic Disease     Additional assistance with care in the home        Goal:  Patient and family will demonstrate understanding of options available for additional care in the home and will apply for such services    03/24/20  Assessment:  Patient currently requires 24 hour care due to dementia.   Primary caregiver (daughter, Puneet Prather) is currently out of the house receiving rehab following an illness. Other children/family are assisting with care. Discussed option of applying for Medicaid for personal care services. Bebeto Joel to discuss with family.  Plan:  · Follow up with Bebeto Joel after she discussed options with family. [facility]    06/18/20  Assessment:  Spoke with patient's daughter, Puneet Prather. She stated that she and her family are providing care for patient and that her mother would not like assistance from aide. She also stated that patient does not want to apply for Medicaid at this time. Patient has been previously denied. Patient's daughter voiced understanding of available options including respite care arnold program through the [employer]. Goal complete.   AML          Patient's upcoming visits:    Future Appointments   Date Time Provider Alondra Serrano   7/28/2020  1:15 PM Clemente Britton  [address]   8/17/2020  9:40 AM Jerilyn Meng  [address], MSW, ACSW, CCM    [facility] Management Team  [phone]

## 2020-07-16 ENCOUNTER — TELEPHONE (OUTPATIENT)
Dept: INTERNAL MEDICINE CLINIC | Age: 85
End: 2020-07-16

## 2020-07-16 NOTE — TELEPHONE ENCOUNTER
DOROTA Chaudhari/ telephone   Received: Yesterday   Message Contents   [place], [address] Office Pool               Patient's first and last name: Maria Guadalupe Vyas   : 1930   ID numbers: #[id number]     Caller's first and last name: Rubio [facility] service   Reason for call: Therapy   Callback required yes/no and why: yes   Best contact number(s): [phone]   Details to clarify the request: Mia Suh is calling to request orders for occupational and physical therapy for pt. Caller states pt is not able to move around without assistance. Caller is requesting a call to discuss further details.

## 2020-07-20 DIAGNOSIS — G89.29 BILATERAL CHRONIC KNEE PAIN: ICD-10-CM

## 2020-07-20 DIAGNOSIS — G31.84 MCI (MILD COGNITIVE IMPAIRMENT): Primary | ICD-10-CM

## 2020-07-20 DIAGNOSIS — M25.561 BILATERAL CHRONIC KNEE PAIN: ICD-10-CM

## 2020-07-20 DIAGNOSIS — M25.562 BILATERAL CHRONIC KNEE PAIN: ICD-10-CM

## 2020-07-20 DIAGNOSIS — I69.30 HISTORY OF STROKE WITH CURRENT RESIDUAL EFFECTS: ICD-10-CM

## 2020-07-20 DIAGNOSIS — Z91.81 HISTORY OF RECENT FALL: ICD-10-CM

## 2020-07-20 NOTE — TELEPHONE ENCOUNTER
Can not give verbal for [facility] because the pt does not have care established with them. Laurel Collier and Lucho told them that she's getting weaker because she just sitting in the chair. States that Ms. Nisha Brasher usually gets out the chair to go sit on the porch but now she doesn't want to do anything. Ralph Montes states that she is aware that ms lopez has dementia and sometimes doesn't want to do anything hey tell her.  Will need orders placed so that I can fax them to [facility] at [phone]

## 2020-07-20 NOTE — TELEPHONE ENCOUNTER
She may have verbal orders for PT/OT. , chooses to move when she wants. She has dementia and lives with her daughter who tries to care for her but could use an assist. Davina Grimes is welcome to get a  to help with this, she can have a verbal order for this also. You may discuss any other details she wishes to share.

## 2020-07-28 ENCOUNTER — OFFICE VISIT (OUTPATIENT)
Dept: INTERNAL MEDICINE CLINIC | Age: 85
End: 2020-07-28

## 2020-07-28 VITALS
TEMPERATURE: 98 F | HEART RATE: 67 BPM | SYSTOLIC BLOOD PRESSURE: 134 MMHG | RESPIRATION RATE: 17 BRPM | HEIGHT: 61 IN | BODY MASS INDEX: 24.56 KG/M2 | DIASTOLIC BLOOD PRESSURE: 68 MMHG | OXYGEN SATURATION: 98 %

## 2020-07-28 DIAGNOSIS — E05.90 SUBCLINICAL HYPERTHYROIDISM: ICD-10-CM

## 2020-07-28 DIAGNOSIS — M25.561 BILATERAL CHRONIC KNEE PAIN: ICD-10-CM

## 2020-07-28 DIAGNOSIS — I10 ESSENTIAL HYPERTENSION: Primary | ICD-10-CM

## 2020-07-28 DIAGNOSIS — R41.3 IMPAIRED MEMORY: ICD-10-CM

## 2020-07-28 DIAGNOSIS — Z00.00 MEDICARE ANNUAL WELLNESS VISIT, SUBSEQUENT: ICD-10-CM

## 2020-07-28 DIAGNOSIS — Z71.89 ADVANCE CARE PLANNING: ICD-10-CM

## 2020-07-28 DIAGNOSIS — E78.2 MIXED HYPERLIPIDEMIA: ICD-10-CM

## 2020-07-28 DIAGNOSIS — I10 HTN (HYPERTENSION), BENIGN: ICD-10-CM

## 2020-07-28 DIAGNOSIS — K59.04 CHRONIC IDIOPATHIC CONSTIPATION: ICD-10-CM

## 2020-07-28 DIAGNOSIS — M47.812 FACET ARTHROPATHY, CERVICAL: ICD-10-CM

## 2020-07-28 DIAGNOSIS — Z13.0 SCREENING FOR ENDOCRINE, NUTRITIONAL, METABOLIC AND IMMUNITY DISORDER: ICD-10-CM

## 2020-07-28 DIAGNOSIS — Z13.21 SCREENING FOR ENDOCRINE, NUTRITIONAL, METABOLIC AND IMMUNITY DISORDER: ICD-10-CM

## 2020-07-28 DIAGNOSIS — Z13.228 SCREENING FOR ENDOCRINE, NUTRITIONAL, METABOLIC AND IMMUNITY DISORDER: ICD-10-CM

## 2020-07-28 DIAGNOSIS — M25.562 BILATERAL CHRONIC KNEE PAIN: ICD-10-CM

## 2020-07-28 DIAGNOSIS — G31.84 MCI (MILD COGNITIVE IMPAIRMENT): ICD-10-CM

## 2020-07-28 DIAGNOSIS — G89.29 BILATERAL CHRONIC KNEE PAIN: ICD-10-CM

## 2020-07-28 DIAGNOSIS — Z13.29 SCREENING FOR ENDOCRINE, NUTRITIONAL, METABOLIC AND IMMUNITY DISORDER: ICD-10-CM

## 2020-07-28 RX ORDER — LUBIPROSTONE 24 UG/1
24 CAPSULE, GELATIN COATED ORAL 2 TIMES DAILY WITH MEALS
Qty: 180 CAP | Refills: 3 | Status: SHIPPED | OUTPATIENT
Start: 2020-07-28 | End: 2020-09-03 | Stop reason: RX

## 2020-07-28 RX ORDER — LISINOPRIL 20 MG/1
TABLET ORAL
Qty: 90 TAB | Refills: 3 | Status: SHIPPED | OUTPATIENT
Start: 2020-07-28 | End: 2021-07-14

## 2020-07-28 RX ORDER — DEXTROMETHORPHAN HYDROBROMIDE, GUAIFENESIN 5; 100 MG/5ML; MG/5ML
650 LIQUID ORAL
Qty: 100 TAB | Refills: 3 | Status: SHIPPED | OUTPATIENT
Start: 2020-07-28 | End: 2021-07-14

## 2020-07-28 RX ORDER — AMLODIPINE BESYLATE 5 MG/1
5 TABLET ORAL DAILY
Qty: 90 TAB | Refills: 3
Start: 2020-07-28 | End: 2020-12-15 | Stop reason: SDUPTHER

## 2020-07-28 RX ORDER — DICLOFENAC SODIUM 10 MG/G
2 GEL TOPICAL EVERY 6 HOURS
Qty: 100 G | Refills: 2 | Status: SHIPPED | OUTPATIENT
Start: 2020-07-28 | End: 2021-04-21 | Stop reason: SDUPTHER

## 2020-07-28 NOTE — PROGRESS NOTES
Pt is here for   Chief Complaint   Patient presents with    Follow-up     for dementia and HTN    Annual Wellness Visit     medicare     Pt denies pain at this time    1. Have you been to the ER, urgent care clinic since your last visit? Hospitalized since your last visit? No    2. Have you seen or consulted any other health care providers outside of the [address] [place] [address] since your last visit? Include any pap smears or colon screening.  No

## 2020-07-28 NOTE — PROGRESS NOTES
Gaby Calhoun is a 80 y.o. female and presents with Follow-up (for dementia and HTN) and Annual Wellness Visit (medicare)    Subjective:  Pt here for subsequent MWV and to f/u HTN, CHOL, and dementia. Taking meds as prescribed. Feels good. No acute complaints otherwise. Denies side effects from medication. Hypertension Review:  The patient has hypertension  Diet and Lifestyle: generally does try to follow a  low sodium diet, exercises sporadically   Home BP Monitoring: is not measured at home. Pertinent ROS: taking medications as instructed, no medication side effects noted. No TIA's, chest pain on exertion, dyspnea on exertion, or swelling of ankles. BP Readings from Last 3 Encounters:   07/28/20 134/68   02/11/20 160/80   12/18/19 152/66     Dyslipidemia Review:  Patient presents for evaluation of lipids. Compliance with treatment thus far has been good. Denies myalgias, slurring speech, unilateral weakness, and chest pain. A repeat fasting lipid profile was done/ordered. The patient does use medications that may worsen dyslipidemias (corticosteroids, progestins, anabolic steroids, diuretics, beta-blockers, amiodarone, cyclosporine, olanzapine). The patient does NOT exercise regularly, pt reports walking around the house for ~ 10 minutes, but daughter shaking head no during pt report. The patient is known to have coexisting coronary artery disease: TIA. Taking Aspirin daily as prescribed/advised.       Review of Systems  Constitutional: negative for fevers, chills, anorexia and weight loss  Respiratory:  negative for cough, hemoptysis, dyspnea, and wheezing  CV:   negative for chest pain, palpitations, and lower extremity edema  GI:   negative for nausea, vomiting, diarrhea, abdominal pain, and melena  Endo:               negative for polyuria,polydipsia,polyphagia, and heat intolerance  Genitourinary: negative for frequency, urgency, dysuria, retention, and hematuria  Integument:  negative for rash, ulcerations  Hematologic:  negative for easy bruising and bleeding  Musculoskel: negative for muscle weakness  Neurological:  negative for headaches, dizziness, vertigo,and memory problems  Behavl/Psych: negative for feelings of anxiety, depression, suicide, and mood changes    Past Medical History:   Diagnosis Date    Cancer ([address]     right breast.    Chronic pain     CVA     HTN (hypertension) 2/9/2011    Hypertension     Microscopic hematuria 5/11/2011    Other and unspecified hyperlipidemia 2/9/2011    Subclinical hyperthyroidism 8/29/2015    Venous thrombosis 2/17/2010    left arm DVT     Past Surgical History:   Procedure Laterality Date    BREAST SURGERY PROCEDURE UNLISTED      HX HEENT      HX HEMORRHOIDECTOMY       Social History     Socioeconomic History    Marital status:      Spouse name: Not on file    Number of children: Not on file    Years of education: Not on file    Highest education level: Not on file   Tobacco Use    Smoking status: Former Smoker    Smokeless tobacco: Never Used    Tobacco comment: smoked for 10 yrs   Substance and Sexual Activity    Alcohol use: Yes     Alcohol/week: 0.8 standard drinks     Types: 1 Cans of beer per week     Comment: occ    Drug use: No    Sexual activity: Not Currently     Family History   Problem Relation Age of Onset    Stroke Mother     Stroke Father     Diabetes Daughter      Current Outpatient Medications   Medication Sig Dispense Refill    lubiPROStone (Amitiza) 24 mcg capsule Take 1 Cap by mouth two (2) times daily (with meals). Indications: chronic idiopathic constipation 180 Cap 3    lisinopriL (PRINIVIL, ZESTRIL) 20 mg tablet TAKE 1 TABLET BY MOUTH EVERY DAY 90 Tab 3    diclofenac (VOLTAREN) 1 % gel Apply 2 g to affected area every six (6) hours. 100 g 2    acetaminophen (Tylenol Arthritis Pain) 650 mg TbER Take 1 Tab by mouth three (3) times daily as needed for Pain.  Indications: pain associated with arthritis 100 Tab 3    amLODIPine (NORVASC) 5 mg tablet Take 1 Tab by mouth daily. 90 Tab 3    metoprolol succinate (TOPROL-XL) 50 mg XL tablet TAKE 1 TABLET BY MOUTH EVERY DAY 90 Tab 3    donepeziL (ARICEPT) 10 mg tablet Take 1 Tab by mouth nightly. 90 Tab 1    methIMAzole (TAPAZOLE) 5 mg tablet Take 1 Tab by mouth every Monday, Wednesday, Friday. 30 Tab 11    cholecalciferol (VITAMIN D3) (1000 Units /25 mcg) tablet Take 2 Tabs by mouth daily. 180 Tab 3    simvastatin (ZOCOR) 20 mg tablet Take 1 Tab by mouth nightly. 90 Tab 3    multivitamin (ONE A DAY) tablet Take 1 Tab by mouth daily. Indications: Treatment To Prevent Vitamin Deficiency 90 Tab 3    anastrozole (ARIMIDEX) 1 mg tablet Taken sporadically per daughter  3    mometasone (ELOCON) 0.1 % topical cream Apply  to affected area daily. 15 g 1    aspirin delayed-release 81 mg tablet Take 1 Tab by mouth daily. 90 Tab 3    miscellaneous medical supply [facility] bed. 1 Each 0     No Known Allergies    Objective:  Visit Vitals  /68 (BP 1 Location: Left arm, BP Patient Position: Sitting)   Pulse 67   Temp 98 °F (36.7 °C)   Resp 17   Ht 5' 1\" (1.549 m)   SpO2 98%   BMI 24.56 kg/m²     Wt Readings from Last 3 Encounters:   02/11/20 130 lb (59 kg)   10/18/19 130 lb (59 kg)   06/17/19 128 lb (58.1 kg)     Physical Exam:    General appearance - alert, well appearing, and in no distress. Seated comfortably in W/C. Mental status - normal mood and affect, smiling and pleasant. Neck - No JVD. Chest - CTA. Symmetric chest rise. No wheezing, rales or rhonchi. Heart - Normal rate, regular rhythm. Normal S1, S2. +murmur, soft? Ext- Radial, DP pulses, 2+ bilaterally. No pedal edema, clubbing, or cyanosis. Skin-Warm and dry. No clubbing or cyanosis. Hyperpigmentation to multiple nevi and skin tags. Neuro - Normal speech, no focal findings or movement disorder. Normal strength, gait, and muscle tone.      Assessment of cognitive impairment: Alert and oriented x 2, person and place. Mentioned June for month and weather as reason for mask wearing, not COVID. Unable to recall any of 3 words given and mentions \"sex\" as meaning of \"two birds of a feather flock together\". Depression Screen:   3 most recent PHQ Screens 7/28/2020   Little interest or pleasure in doing things Not at all   Feeling down, depressed, irritable, or hopeless Several days   Total Score PHQ 2 1       Fall Risk Assessment:    Fall Risk Assessment, last 12 mths 7/28/2020   Able to walk? Yes   Fall in past 12 months? No   Fall with injury? -   Number of falls in past 12 months -   Fall Risk Score -       Abuse Assessment: Patient not domesticly abuse (verbal, physical, and financial)    Current Alcohol use: pt reports drinking one beer every 2-3 days, but daughter shakes her head no to this. reports current alcohol use of about 0.8 standard drinks of alcohol per week. Functional Ability:   Does the patient exhibit a steady gait? Yes   How long did it take the patient to get up and walk from a sitting position? 4 seconds   Is the patient self reliant?  (ie can do own laundry, meals, household chores) pt reports helping with dishes, daughters mostly manage. Does the patient handle his/her own medications? Pt reports taking her own meds, but daughter states sister Deloris Carlos manages. Does the patient handle his/her own money? Pt reports paying her bills in her house, but daughter states sister Deloris Carlos manages. Is the patients home safe (ie good lighting, handrails on stairs and bath, etc.)? Yes   Did you notice or did patient express any hearing difficulties? no   Did you notice of did patient express any vision difficulties?  no   Were distance and reading eye charts used? n/a     Advance Care Planning:   Patient was offered the opportunity to discuss advance care planning:  Yes   Does patient have an Advance Directive: No, has dementia   If no, did you provide information and forms?   Yes, on POST     Health Maintenance   Topic Date Due    Shingrix Vaccine Age 49> (1 of 2) 07/01/1980    Lipid Screen  02/13/2020    Pneumococcal 65+ years (1 of 1 - PPSV23) 12/18/2020 (Originally 7/1/1995)    Influenza Age 5 to Adult  08/01/2020    GLAUCOMA SCREENING Q2Y  10/28/2020    Medicare Yearly Exam  07/29/2021    DTaP/Tdap/Td series (2 - Td) 09/16/2026    Bone Densitometry (Dexa) Screening  Completed       Assessment/Plan:  Labs ordered. The current medical regimen is effective;  continue present plan and medications. Medication Side Effects and Warnings were discussed with patient: yes   Patient Labs were reviewed: yes  Patient Past Records were reviewed: yes    See below for other orders         ICD-10-CM ICD-9-CM    1. Essential hypertension  T65 712.8 METABOLIC PANEL, COMPREHENSIVE      CBC WITH AUTOMATED DIFF      lisinopriL (PRINIVIL, ZESTRIL) 20 mg tablet      amLODIPine (NORVASC) 5 mg tablet   2. MCI (mild cognitive impairment)  G31.84 331.83 REFERRAL TO [facility] CLINICAL SPECIALIST   3. Mixed hyperlipidemia  E78.2 272.2 LIPID PANEL   4. Subclinical hyperthyroidism  E05.90 242.90 TSH 3RD GENERATION   5. Medicare annual wellness visit, subsequent  Z00.00 V70.0 REFERRAL TO [facility] CLINICAL SPECIALIST   6. Screening for endocrine, nutritional, metabolic and immunity disorder  X41.40 F50.04 METABOLIC PANEL, COMPREHENSIVE    Z13.21  CBC WITH AUTOMATED DIFF    Z13.228  HEMOGLOBIN A1C WITH EAG    Z13.0  TSH 3RD GENERATION   7. Impaired memory  R41.3 780.93    8. Chronic idiopathic constipation  K59.04 564.00 lubiPROStone (Amitiza) 24 mcg capsule   9. HTN (hypertension), benign  I10 401.1    10. Bilateral chronic knee pain  M25.561 719.46 diclofenac (VOLTAREN) 1 % gel    M25.562 338.29 acetaminophen (Tylenol Arthritis Pain) 650 mg TbER    G89.29     11. Facet arthropathy, cervical  M47.812 721.0 diclofenac (VOLTAREN) 1 % gel      acetaminophen (Tylenol Arthritis Pain) 650 mg TbER   12.  Advance care planning Z71.89 V65.49 REFERRAL TO [facility] CLINICAL SPECIALIST     Orders Placed This Encounter    METABOLIC PANEL, COMPREHENSIVE    CBC WITH AUTOMATED DIFF    HEMOGLOBIN A1C WITH EAG    LIPID PANEL    TSH 3RD GENERATION    REFERRAL TO [facility] CLINICAL SPECIALIST     Referral Priority:   Routine     Referral Type:   Consultation     Referral Reason:   Specialty Services Required     Number of Visits Requested:   1    lubiPROStone (Amitiza) 24 mcg capsule     Sig: Take 1 Cap by mouth two (2) times daily (with meals). Indications: chronic idiopathic constipation     Dispense:  180 Cap     Refill:  3    lisinopriL (PRINIVIL, ZESTRIL) 20 mg tablet     Sig: TAKE 1 TABLET BY MOUTH EVERY DAY     Dispense:  90 Tab     Refill:  3    diclofenac (VOLTAREN) 1 % gel     Sig: Apply 2 g to affected area every six (6) hours. Dispense:  100 g     Refill:  2    acetaminophen (Tylenol Arthritis Pain) 650 mg TbER     Sig: Take 1 Tab by mouth three (3) times daily as needed for Pain. Indications: pain associated with arthritis     Dispense:  100 Tab     Refill:  3    amLODIPine (NORVASC) 5 mg tablet     Sig: Take 1 Tab by mouth daily. Dispense:  90 Tab     Refill:  3       Alfred Doll expressed understanding of plan. An After Visit Summary was offered/printed and given to the patient. The patient's abnormal BMI was reviewed and deemed target BMI for the patient. An After Visit Summary was provided to the patient and/or caregiver.

## 2020-07-28 NOTE — PATIENT INSTRUCTIONS
Advance Care Planning: Care Instructions  Your Care Instructions     It can be hard to live with an illness that cannot be cured. But if your health is getting worse, you may want to make decisions about end-of-life care. Planning for the end of your life does not mean that you are giving up. It is a way to make sure that your wishes are met. Clearly stating your wishes can make it easier for your loved ones. Making plans while you are still able may also ease your mind and make your final days less stressful and more meaningful. Follow-up care is a key part of your treatment and safety. Be sure to make and go to all appointments, and call your doctor if you are having problems. It's also a good idea to know your test results and keep a list of the medicines you take. What can you do to plan for the end of life? · You can bring these issues up with your doctor. You do not need to wait until your doctor starts the conversation. You might start with \"I would not be willing to live with . Ria Ask Ria Ask Ria Ask \" When you complete this sentence it helps your doctor understand your wishes. · Talk openly and honestly with your doctor. This is the best way to understand the decisions you will need to make as your health changes. Know that you can always change your mind. · Ask your doctor about commonly used life-support measures. These include tube feedings, breathing machines, and fluids given through a vein (IV). Understanding these treatments will help you decide whether you want them. · You may choose to have these life-supporting treatments for a limited time. This allows a trial period to see whether they will help you. You may also decide that you want your doctor to take only certain measures to keep you alive. It is important to spell out these conditions so that your doctor and family understand them. · Talk to your doctor about how long you are likely to live.  He or she may be able to give you an idea of what usually happens with your specific illness. · Think about preparing papers that state your wishes. This way there will not be any confusion about what you want. You can change your instructions at any time. Which papers should you prepare? Advance directives are legal papers that tell doctors how you want to be cared for at the end of your life. You do not need a  to write these papers. Ask your doctor or your state health department for information on how to write your advance directives. They may have the forms for each of these types of papers. Make sure your doctor has a copy of these on file, and give a copy to a family member or close friend. · Consider a do-not-resuscitate order (DNR). This order asks that no extra treatments be done if your heart stops or you stop breathing. Extra treatments may include cardiopulmonary resuscitation (CPR), electrical shock to restart your heart, or a machine to breathe for you. If you decide to have a DNR order, ask your doctor to explain and write it. Place the order in your home where everyone can easily see it. · Consider a living will. A living will explains your wishes about life support and other treatments at the end of your life if you become unable to speak for yourself. Living king tell doctors to use or not use treatments that would keep you alive. You must have one or two witnesses or a notary present when you sign this form. A living will may be called something else in your state. · Consider a medical power of . This form allows you to name a person to make decisions about your care if you are not able to. Most people ask a close friend or family member. Talk to this person about the kinds of treatments you want and those that you do not want. Make sure this person understands your wishes. A medical power of  may be called something else in your state. These legal papers are simple to change.  Tell your doctor what you want to change, and ask him or her to make a note in your medical file. Give your family updated copies of the papers. Where can you learn more? Go to http://www.itBit.com/  Enter P184 in the search box to learn more about \"Advance Care Planning: Care Instructions. \"  Current as of: December 9, 2019               Content Version: 12.5  © 0744-0769 [employer]. Care instructions adapted under license by [employer] (which disclaims liability or warranty for this information). If you have questions about a medical condition or this instruction, always ask your healthcare professional. [address] any warranty or liability for your use of this information.

## 2020-07-28 NOTE — PROGRESS NOTES
Advanced care planning- discussed Advance directive, Medical POA, and life sustaining options. Advised of free in-person visit for advance care planning paperwork completion with ACP facilitator. Advance Care Planning (ACP) Provider Conversation Snapshot    Date of ACP Conversation: 07/28/20  Persons included in Conversation:  Patient/family about POST form completion  Length of ACP Conversation in minutes:  5-10 minutes    Authorized Decision Maker (if patient is incapable of making informed decisions): This person is:   Healthcare Agent/Medical Power of  under Advance Directive        For Patients with Decision Making Capacity:   Intubation, CPR, use of IVF/Nutrition, Tube Feedings, and organ donation options reviewed briefly    Conversation Outcomes / Follow-Up Plan:   Recommended completion of Advance Directive form after review of ACP materials and conversation with prospective healthcare agent     Referral made for ACP follow-up assistance to:  ACP facilitator    ====Advance Care Planning Invitation====    Patient was invited to begin or continue Advance Care Planning on this date and was provided an ACP information folder for review OR reviewed ACP materials in the office. Recommended appointment with a [employer]  facilitator for ACP conversation regarding advance directives. [x] Yes  [] No  Referral sent to [employer] ACP team member or Coordinator for follow-up    [] Yes  [x] No  Patient scheduled an appointment.        Site of Referral: [facility]

## 2020-07-29 ENCOUNTER — TELEPHONE (OUTPATIENT)
Dept: INTERNAL MEDICINE CLINIC | Age: 85
End: 2020-07-29

## 2020-07-29 DIAGNOSIS — E05.90 SUBCLINICAL HYPERTHYROIDISM: Primary | ICD-10-CM

## 2020-07-29 LAB
ALBUMIN SERPL-MCNC: 4 G/DL (ref 3.5–4.6)
ALBUMIN/GLOB SERPL: 1.3 {RATIO} (ref 1.2–2.2)
ALP SERPL-CCNC: 90 IU/L (ref 39–117)
ALT SERPL-CCNC: 6 IU/L (ref 0–32)
AST SERPL-CCNC: 12 IU/L (ref 0–40)
BASOPHILS # BLD AUTO: 0.1 X10E3/UL (ref 0–0.2)
BASOPHILS NFR BLD AUTO: 1 %
BILIRUB SERPL-MCNC: 0.4 MG/DL (ref 0–1.2)
BUN SERPL-MCNC: 12 MG/DL (ref 10–36)
BUN/CREAT SERPL: 14 (ref 12–28)
CALCIUM SERPL-MCNC: 9.7 MG/DL (ref 8.7–10.3)
CHLORIDE SERPL-SCNC: 103 MMOL/L (ref 96–106)
CHOLEST SERPL-MCNC: 159 MG/DL (ref 100–199)
CO2 SERPL-SCNC: 25 MMOL/L (ref 20–29)
CREAT SERPL-MCNC: 0.87 MG/DL (ref 0.57–1)
EOSINOPHIL # BLD AUTO: 0 X10E3/UL (ref 0–0.4)
EOSINOPHIL NFR BLD AUTO: 1 %
ERYTHROCYTE [DISTWIDTH] IN BLOOD BY AUTOMATED COUNT: 13.7 % (ref 11.7–15.4)
EST. AVERAGE GLUCOSE BLD GHB EST-MCNC: 105 MG/DL
GLOBULIN SER CALC-MCNC: 3.2 G/DL (ref 1.5–4.5)
GLUCOSE SERPL-MCNC: 106 MG/DL (ref 65–99)
HBA1C MFR BLD: 5.3 % (ref 4.8–5.6)
HCT VFR BLD AUTO: 32.8 % (ref 34–46.6)
HDLC SERPL-MCNC: 58 MG/DL
HGB BLD-MCNC: 10.5 G/DL (ref 11.1–15.9)
IMM GRANULOCYTES # BLD AUTO: 0 X10E3/UL (ref 0–0.1)
IMM GRANULOCYTES NFR BLD AUTO: 1 %
INTERPRETATION, 910389: NORMAL
INTERPRETATION: NORMAL
LDLC SERPL CALC-MCNC: 87 MG/DL (ref 0–99)
LYMPHOCYTES # BLD AUTO: 1 X10E3/UL (ref 0.7–3.1)
LYMPHOCYTES NFR BLD AUTO: 18 %
MCH RBC QN AUTO: 34 PG (ref 26.6–33)
MCHC RBC AUTO-ENTMCNC: 32 G/DL (ref 31.5–35.7)
MCV RBC AUTO: 106 FL (ref 79–97)
MONOCYTES # BLD AUTO: 0.5 X10E3/UL (ref 0.1–0.9)
MONOCYTES NFR BLD AUTO: 9 %
NEUTROPHILS # BLD AUTO: 3.9 X10E3/UL (ref 1.4–7)
NEUTROPHILS NFR BLD AUTO: 70 %
PDF IMAGE, 910387: NORMAL
PLATELET # BLD AUTO: 206 X10E3/UL (ref 150–450)
POTASSIUM SERPL-SCNC: 3.7 MMOL/L (ref 3.5–5.2)
PROT SERPL-MCNC: 7.2 G/DL (ref 6–8.5)
RBC # BLD AUTO: 3.09 X10E6/UL (ref 3.77–5.28)
SODIUM SERPL-SCNC: 144 MMOL/L (ref 134–144)
TRIGL SERPL-MCNC: 68 MG/DL (ref 0–149)
TSH SERPL DL<=0.005 MIU/L-ACNC: 0.01 UIU/ML (ref 0.45–4.5)
VLDLC SERPL CALC-MCNC: 14 MG/DL (ref 5–40)
WBC # BLD AUTO: 5.5 X10E3/UL (ref 3.4–10.8)

## 2020-07-29 RX ORDER — METHIMAZOLE 5 MG/1
5 TABLET ORAL DAILY
Qty: 30 TAB | Refills: 11 | Status: SHIPPED | OUTPATIENT
Start: 2020-07-29 | End: 2021-01-01 | Stop reason: SDUPTHER

## 2020-07-29 NOTE — PROGRESS NOTES
Can you call [facility] and ensure mother IS taking her methimazole M,W,F? If so, then she needs to take DAILY now and we can repeat TSH in 4 weeks with VV. If she is NOT, then ask them to ensure she takes it as last prescribed. Let me know if I need to change Rx to daily dosing if she is taking as last prescribed. Thanks Jordan.

## 2020-07-29 NOTE — TELEPHONE ENCOUNTER
Clearance Emeka from [address] [place] [address] want to know if order for PT/OT can be resent, fax [phone], she can be reach at [phone]

## 2020-07-31 ENCOUNTER — TELEPHONE (OUTPATIENT)
Dept: CASE MANAGEMENT | Age: 85
End: 2020-07-31

## 2020-07-31 NOTE — ACP (ADVANCE CARE PLANNING)
ACP AMB Specialist referral received. I will contact daughter on Monday re: possible POST conversation.   Janae Martinez LCSW

## 2020-08-03 ENCOUNTER — TELEPHONE (OUTPATIENT)
Dept: CASE MANAGEMENT | Age: 85
End: 2020-08-03

## 2020-08-04 ENCOUNTER — TELEPHONE (OUTPATIENT)
Dept: NEUROLOGY | Age: 85
End: 2020-08-04

## 2020-08-04 NOTE — TELEPHONE ENCOUNTER
----- Message from Katlyn Fernandez sent at 8/4/2020  3:20 PM EDT -----  Regarding: Dr. Martir Graham  General Message/Vendor Calls    Caller's first and last name:Gertrudis Doll(daughter)      Reason for call:new pt appt      Callback required yes/no and why:yes      Best contact number(s): [phone]      Details to clarify the request:Pt's daughter requested a call to schedule a new pt appt for evaluation of  dementia.       Katlyn Fernandez

## 2020-08-05 ENCOUNTER — TELEPHONE (OUTPATIENT)
Dept: CASE MANAGEMENT | Age: 85
End: 2020-08-05

## 2020-08-05 NOTE — ACP (ADVANCE CARE PLANNING)
ACP POST conversation scheduled for today. Spoke with pt's daughter, Vaishali Womack, who verified pt did not have an ACP document. She stated she would be making the medical decisions for her mother; for, she is the oldest. I explained the hierarchy of medical decision making in the [facility]. Pt has three children (2 daughters/1 son). I requested she check with her siblings for a time we could have this conversation with all present via telephone. I supplied her with possible dates I have open to coordinate an appt. I will check with her again early next week if I have not received a call.   Larry Dale LCSW

## 2020-08-11 ENCOUNTER — OFFICE VISIT (OUTPATIENT)
Dept: NEUROLOGY | Age: 85
End: 2020-08-11
Payer: MEDICARE

## 2020-08-11 DIAGNOSIS — F03.90 MAJOR NEUROCOGNITIVE DISORDER DUE TO ALZHEIMER'S DISEASE, PROBABLE, WITHOUT BEHAVIORAL DISTURBANCE: Primary | ICD-10-CM

## 2020-08-11 PROCEDURE — 96116 NUBHVL XM PHYS/QHP 1ST HR: CPT | Performed by: PSYCHOLOGIST

## 2020-08-11 NOTE — PROGRESS NOTES
This note will not be viewable in [address] [facility] at [address]    Office:  [phone]  Fax: [phone]                 Initial Office Exam    Patient Name: Cristela Da Silva  Age: 80 y.o. Gender: female   Occupation: Retired  Handedness: right handed   Presenting Concern: Mild Cognitive Impairment  Primary Care Physician: Manjeet Suresh NP  Referring Provider: Manjeet Suresh NP      REASON FOR REFERRAL:  This comprehensive and medically necessary neuropsychological assessment was requested to assist with a differential diagnosis of MCI. The use and purpose of this examination, as well as the extent and limitations of confidentiality, were explained prior to obtaining permission to participate. Instructions were provided regarding the necessity to put forth optimal effort and answer questions truthfully in order to obtain reliable and accurate test results. PERTINENT HISTORY:  Ms. Meg Leija presented for a neuropsychological assessment at the recommendation of her treating physician secondary to complaints of being more forgetful, missing medications, and is refusing to do things according to the daughter. She has reported symptoms that include starting and completing activities in a timely manner, difficulties conversing, no longer cares about things, seems depressed, seems anxious, feels slowed down, more irritable, more aggressive towards others, and less tolerant of frustration. Ms. Meg Leija began noticing symptoms in the past 2-3 years ago. A recent MRI indicated generalized volume loss, with disproportionate atrophy of the bilateral hippocampi and mesial temporal lobes. There is is also indications of Mild Chronic WM disease according to records. She reportedly had a stroke in 2003.  From a brief review of her medical and personal history there has not been any other significant neurological injury or illness noted or reported. She did report experiencing anxiety in the past.      Ms. Pepe Chavez does  report any problems at birth or difficulties meeting developmental milestones. She reports that she had an adequate level of family support and was not subject to trauma or abuse as a child. Ms. Pepe Chavez does  report being retain in school or receiving special assistance in any of she classes or subjects. Ms. Pepe Chavez completed 9 years of education. Ms. Pepe Chavez does not  exercise on a regular basis and does not maintain a balanced diet. She does not  report problems with sleep and does not complain of pain. She does not  participate in mentally stimulating activities. Ms. Pepe Chavez does not  have concerns or stressors. Ms. Pepe Chavez indicated that she is not independent in her instrumental activities of daily living, including shopping, meal preparation, housekeeping, doing laundry, driving a car, managing medications, and finances. Current Outpatient Medications   Medication Sig    methIMAzole (TAPAZOLE) 5 mg tablet Take 1 Tab by mouth daily.  lubiPROStone (Amitiza) 24 mcg capsule Take 1 Cap by mouth two (2) times daily (with meals). Indications: chronic idiopathic constipation    lisinopriL (PRINIVIL, ZESTRIL) 20 mg tablet TAKE 1 TABLET BY MOUTH EVERY DAY    diclofenac (VOLTAREN) 1 % gel Apply 2 g to affected area every six (6) hours.  acetaminophen (Tylenol Arthritis Pain) 650 mg TbER Take 1 Tab by mouth three (3) times daily as needed for Pain. Indications: pain associated with arthritis    amLODIPine (NORVASC) 5 mg tablet Take 1 Tab by mouth daily.  metoprolol succinate (TOPROL-XL) 50 mg XL tablet TAKE 1 TABLET BY MOUTH EVERY DAY    donepeziL (ARICEPT) 10 mg tablet Take 1 Tab by mouth nightly.  cholecalciferol (VITAMIN D3) (1000 Units /25 mcg) tablet Take 2 Tabs by mouth daily.     simvastatin (ZOCOR) 20 mg tablet Take 1 Tab by mouth nightly.  multivitamin (ONE A DAY) tablet Take 1 Tab by mouth daily. Indications: Treatment To Prevent Vitamin Deficiency    [facility].  anastrozole (ARIMIDEX) 1 mg tablet Taken sporadically per daughter    mometasone (ELOCON) 0.1 % topical cream Apply  to affected area daily.  aspirin delayed-release 81 mg tablet Take 1 Tab by mouth daily. No current facility-administered medications for this visit. Past Medical History:   Diagnosis Date    Cancer [facility])     right breast.    Chronic pain     CVA     HTN (hypertension) 2/9/2011    Hypertension     Microscopic hematuria 5/11/2011    Other and unspecified hyperlipidemia 2/9/2011    Subclinical hyperthyroidism 8/29/2015    Venous thrombosis 2/17/2010    left arm DVT       No flowsheet data found. No data recorded    Past Surgical History:   Procedure Laterality Date    BREAST SURGERY PROCEDURE UNLISTED      HX HEENT      HX HEMORRHOIDECTOMY         Social History     Socioeconomic History    Marital status:      Spouse name: Not on file    Number of children: Not on file    Years of education: Not on file    Highest education level: Not on file   Tobacco Use    Smoking status: Former Smoker    Smokeless tobacco: Never Used    Tobacco comment: smoked for 10 yrs   Substance and Sexual Activity    Alcohol use: Yes     Alcohol/week: 0.8 standard drinks     Types: 1 Cans of beer per week     Comment: occ    Drug use: No    Sexual activity: Not Currently       Family History   Problem Relation Age of Onset    Stroke Mother     Stroke Father     Diabetes Daughter        CT Results (most recent):  Results from Hospital Encounter encounter on 09/16/15   CT HEAD WO CONT    Narrative **Final Report**       ICD Codes / Adm. Diagnosis: 746575  724.1 / Extremity Weakness  possible   strock  Examination:  CT HEAD WO CON  - [id number] - Sep 16 2015  9:44AM  Accession No:  [id number]  Reason:  stroke      REPORT:  INDICATION: Stroke. Left extremity weakness. COMPARISON: January 5, 2012    FINDINGS: 5 mm axial images were obtained from the skull base through the   vertex. The ventricles and cortical sulci are stable in size and   configuration. There is no evidence of intracranial hemorrhage, mass, mass   effect, or acute infarct. No extra-axial fluid collections are seen. Tiny   lipoma along the anterior falx is unchanged. The visualized paranasal   sinuses and mastoid air cells are clear. The orbital structures are   unremarkable. No osseous abnormalities are seen. IMPRESSION: No acute intracranial process. No change from the prior study. Signing/Reading Doctor: Vermell Goltz ([id number])    Approved: Vermell Goltz ([id number])  Sep 16 2015 10:02AM                                   MRI Results (most recent):  Results from [place] encounter on 03/10/20   MRI BRAIN WO CONT    Narrative EXAM: MRI BRAIN WO CONT    INDICATION: adv memory loss    COMPARISON: CT head 9/16/2015. CONTRAST: None. TECHNIQUE:    Multiplanar multisequence acquisition without contrast of the brain. FINDINGS:  Generalized parenchymal volume loss with commensurate dilation of the sulci and  ventricular system. There is marked disproportionate atrophy of the bilateral  hippocampi and mesial temporal lobes. Periventricular deep white matter T2/FLAIR  hyperintensities, consistent with mild chronic microvascular ischemic disease. There is no acute infarct, hemorrhage, extra-axial fluid collection, or mass  effect. There is no cerebellar tonsillar herniation. Expected arterial  flow-voids are present. The paranasal sinuses, mastoid air cells, and middle ears are clear. The orbital  contents are within normal limits with bilateral lens implants. No significant  osseous or scalp lesions are identified. Impression IMPRESSION:     1.  No acute intracranial abnormality. 2. Generalized parenchymal volume loss with disproportionate atrophy of the  bilateral hippocampi and mesial temporal lobes, suggestive of Alzheimer's  dementia. Correlate clinically. 3. Mild chronic microvascular ischemic disease. MENTAL STATUS:    Orientation:  Oriented to season and month only   Eye Contact:  Poor   Motor Behavior:   Ambulates with assistance in a wheelchair   Speech:   Fluent, intelligible,    Thought Process:  tangential   Thought Content:  Evidence of hallucinations or delusions   Suicidal ideations:  Denies   Mood:   Dysphoric/anxious   Affect:   Normal   Concentration:   Impaired   Abstraction:   Impaired   Insight:   Very limited     On the Modified Mini-Mental Status Exam: 46/100 (<.01)  Severe impairment  Ms. Johnnie Moore is significant impaired and relies heavily on her daughters  For her daily needs. DIAGNOSTIC IMPRESSIONS:    ICD-10-CM ICD-9-CM    1. Major neurocognitive disorder due to Alzheimer's disease, probable, without behavioral disturbance ([facility] [address]  F01.50 331.0      294.10              PLAN:  1. Complete a comprehensive neuropsychological assessment is not necessary at this time. 2. Ms. Johnnie Moore should be closely supervised for medication compliance. 3. Ms. Johnnie Moore is not competent for decision making regarding her legal, financial, or medical concerns. 4. Patient should not be left alone. 5. Consider referral for elder health nurse to provide an in-home functional assessment. 6. Consider placement issues to provide greater structure and supervision to ensure safety, health and well-being. 47840 x 1 Review of records. Face to face interview w/ patient. Determine test protocol: 60 minutes. Total 1 unit        David Mariscal, PhD, ABPP, LCP  Licensed Clinical Psychologist/ Neuropsychologist        This note will not be viewable in [address]

## 2020-08-11 NOTE — LETTER
8/11/20 Patient: Harini Dooley YOB: 1930 Date of Visit: 8/11/2020 Elaine Sánchez NP 
[facility] [place] [address] [place] [address] [phone] VIA In Basket Dear Elaine Sánchez NP, Thank you for referring Ms. Jaim Tinoco to [address] for evaluation. My notes for this consultation are attached. This note will not be viewable in [address] [facility] at [place] [address], Medical Office Building [address] Office:  [phone]  Fax: [phone] Initial Office Exam 
 
Patient Name: Harini Dooley Age: 80 y.o. Gender: female Occupation: Retired Handedness: right handed Presenting Concern: Mild Cognitive Impairment Primary Care Physician: Waldo Chowdary NP Referring Provider: Waldo Chowdary NP 
 
 
REASON FOR REFERRAL: 
This comprehensive and medically necessary neuropsychological assessment was requested to assist with a differential diagnosis of MCI. The use and purpose of this examination, as well as the extent and limitations of confidentiality, were explained prior to obtaining permission to participate. Instructions were provided regarding the necessity to put forth optimal effort and answer questions truthfully in order to obtain reliable and accurate test results. PERTINENT HISTORY: 
Ms. Manjeet Chiu presented for a neuropsychological assessment at the recommendation of her treating physician secondary to complaints of being more forgetful, missing medications, and is refusing to do things according to the daughter.   She has reported symptoms that include starting and completing activities in a timely manner, difficulties conversing, no longer cares about things, seems depressed, seems anxious, feels slowed down, more irritable, more aggressive towards others, and less tolerant of frustration. Ms. Rico Morrison began noticing symptoms in the past 2-3 years ago. A recent MRI indicated generalized volume loss, with disproportionate atrophy of the bilateral hippocampi and mesial temporal lobes. There is is also indications of Mild Chronic WM disease according to records. She reportedly had a stroke in 2003. From a brief review of her medical and personal history there has not been any other significant neurological injury or illness noted or reported. She did report experiencing anxiety in the past.   
 
Ms. Rico Morrison does  report any problems at birth or difficulties meeting developmental milestones. She reports that she had an adequate level of family support and was not subject to trauma or abuse as a child. Ms. Rico Morrison does  report being retain in school or receiving special assistance in any of she classes or subjects. Ms. Rico Morrison completed 9 years of education. Ms. Rico Morrison does not  exercise on a regular basis and does not maintain a balanced diet. She does not  report problems with sleep and does not complain of pain. She does not  participate in mentally stimulating activities. Ms. Rico Morrison does not  have concerns or stressors. Ms. Rico Morrison indicated that she is not independent in her instrumental activities of daily living, including shopping, meal preparation, housekeeping, doing laundry, driving a car, managing medications, and finances. Current Outpatient Medications Medication Sig  
 methIMAzole (TAPAZOLE) 5 mg tablet Take 1 Tab by mouth daily.  lubiPROStone (Amitiza) 24 mcg capsule Take 1 Cap by mouth two (2) times daily (with meals). Indications: chronic idiopathic constipation  lisinopriL (PRINIVIL, ZESTRIL) 20 mg tablet TAKE 1 TABLET BY MOUTH EVERY DAY  diclofenac (VOLTAREN) 1 % gel Apply 2 g to affected area every six (6) hours.   
 acetaminophen (Tylenol Arthritis Pain) 650 mg TbER Take 1 Tab by mouth three (3) times daily as needed for Pain. Indications: pain associated with arthritis  amLODIPine (NORVASC) 5 mg tablet Take 1 Tab by mouth daily.  metoprolol succinate (TOPROL-XL) 50 mg XL tablet TAKE 1 TABLET BY MOUTH EVERY DAY  donepeziL (ARICEPT) 10 mg tablet Take 1 Tab by mouth nightly.  cholecalciferol (VITAMIN D3) (1000 Units /25 mcg) tablet Take 2 Tabs by mouth daily.  simvastatin (ZOCOR) 20 mg tablet Take 1 Tab by mouth nightly.  multivitamin (ONE A DAY) tablet Take 1 Tab by mouth daily. Indications: Treatment To Prevent Vitamin Deficiency  miscellaneous medical supply [facility] SYS JAQUELINE bed.  anastrozole (ARIMIDEX) 1 mg tablet Taken sporadically per daughter  mometasone (ELOCON) 0.1 % topical cream Apply  to affected area daily.  aspirin delayed-release 81 mg tablet Take 1 Tab by mouth daily. No current facility-administered medications for this visit. Past Medical History:  
Diagnosis Date  Cancer ([address] right breast.  
 Chronic pain  CVA  HTN (hypertension) 2/9/2011  Hypertension  Microscopic hematuria 5/11/2011  Other and unspecified hyperlipidemia 2/9/2011  Subclinical hyperthyroidism 8/29/2015  Venous thrombosis 2/17/2010  
 left arm DVT No flowsheet data found. No data recorded Past Surgical History:  
Procedure Laterality Date  BREAST SURGERY PROCEDURE UNLISTED  HX HEENT    
 HX HEMORRHOIDECTOMY Social History Socioeconomic History  Marital status:  Spouse name: Not on file  Number of children: Not on file  Years of education: Not on file  Highest education level: Not on file Tobacco Use  Smoking status: Former Smoker  Smokeless tobacco: Never Used  Tobacco comment: smoked for 10 yrs Substance and Sexual Activity  Alcohol use: Yes Alcohol/week: 0.8 standard drinks Types: 1 Cans of beer per week Comment: occ  Drug use:  No  
  Sexual activity: Not Currently Family History Problem Relation Age of Onset  Stroke Mother  Stroke Father  Diabetes Daughter CT Results (most recent): 
Results from Hospital Encounter encounter on 09/16/15 CT HEAD WO CONT Narrative **Final Report** 
  
 
ICD Codes / Adm. Diagnosis: 637987  724.1 / Extremity Weakness  possible  
strock Examination:  CT HEAD WO CON  - [id number] - Sep 16 2015  9:44AM 
Accession No:  [id number] Reason:  stroke REPORT: 
INDICATION: Stroke. Left extremity weakness. COMPARISON: January 5, 2012 FINDINGS: 5 mm axial images were obtained from the skull base through the  
vertex. The ventricles and cortical sulci are stable in size and  
configuration. There is no evidence of intracranial hemorrhage, mass, mass  
effect, or acute infarct. No extra-axial fluid collections are seen. Tiny  
lipoma along the anterior falx is unchanged. The visualized paranasal  
sinuses and mastoid air cells are clear. The orbital structures are  
unremarkable. No osseous abnormalities are seen. IMPRESSION: No acute intracranial process. No change from the prior study. Signing/Reading Doctor: Zachery Tobar ([id number]) Luisana Humphries ([id number])  Sep 16 2015 10:02AM                        
 
 
   
 
MRI Results (most recent): 
Results from [facility] Encounter encounter on 03/10/20 MRI BRAIN WO CONT Narrative EXAM: MRI BRAIN WO CONT INDICATION: adv memory loss COMPARISON: CT head 9/16/2015. CONTRAST: None. TECHNIQUE:   
Multiplanar multisequence acquisition without contrast of the brain. FINDINGS: 
Generalized parenchymal volume loss with commensurate dilation of the sulci and 
ventricular system. There is marked disproportionate atrophy of the bilateral 
hippocampi and mesial temporal lobes. Periventricular deep white matter T2/FLAIR 
hyperintensities, consistent with mild chronic microvascular ischemic disease. There is no acute infarct, hemorrhage, extra-axial fluid collection, or mass 
effect. There is no cerebellar tonsillar herniation. Expected arterial 
flow-voids are present. The paranasal sinuses, mastoid air cells, and middle ears are clear. The orbital 
contents are within normal limits with bilateral lens implants. No significant 
osseous or scalp lesions are identified. Impression IMPRESSION:  
 
1. No acute intracranial abnormality. 2. Generalized parenchymal volume loss with disproportionate atrophy of the 
bilateral hippocampi and mesial temporal lobes, suggestive of Alzheimer's 
dementia. Correlate clinically. 3. Mild chronic microvascular ischemic disease. MENTAL STATUS: 
 
Orientation:  Oriented to season and month only Eye Contact:  Poor Motor Behavior:   Ambulates with assistance in a wheelchair Speech:   Fluent, intelligible, Thought Process:  tangential  
Thought Content:  Evidence of hallucinations or delusions Suicidal ideations:  Denies Mood:   Dysphoric/anxious Affect:   Normal  
Concentration:   Impaired Abstraction:   Impaired Insight:   Very limited On the Modified Mini-Mental Status Exam: 46/100 (<.01)  Severe impairment Ms. Rico Morrison is significant impaired and relies heavily on her daughters  For her daily needs. DIAGNOSTIC IMPRESSIONS: 
  ICD-10-CM ICD-9-CM 1. Major neurocognitive disorder due to Alzheimer's disease, probable, without behavioral disturbance ([facility] [address]  F01.50 331.0   
  294.10 PLAN: 
1. Complete a comprehensive neuropsychological assessment is not necessary at this time. 2. Ms. Rico Morrison should be closely supervised for medication compliance. 3. Ms. Rico Morrison is not competent for decision making regarding her legal, financial, or medical concerns. 4. Patient should not be left alone. 5. Consider referral for elder health nurse to provide an in-home functional assessment. 6. Consider placement issues to provide greater structure and supervision to ensure safety, health and well-being. 05569 x 1 Review of records. Face to face interview w/ patient. Determine test protocol: 60 minutes. Total 1 unit Jemal Hill, PhD, ABPP, LCP Licensed Clinical Psychologist/ Neuropsychologist 
 
 
 
This note will not be viewable in [address] If you have questions, please do not hesitate to call me. I look forward to following your patient along with you.  
 
 
Sincerely, 
 
Jemal Hill, PHD

## 2020-08-12 PROBLEM — F03.90 MAJOR NEUROCOGNITIVE DISORDER DUE TO ALZHEIMER'S DISEASE, PROBABLE, WITHOUT BEHAVIORAL DISTURBANCE: Status: ACTIVE | Noted: 2020-08-12

## 2020-08-13 ENCOUNTER — TELEPHONE (OUTPATIENT)
Dept: INTERNAL MEDICINE CLINIC | Age: 85
End: 2020-08-13

## 2020-08-13 NOTE — TELEPHONE ENCOUNTER
----- Message from Chucky Zambrano sent at 8/13/2020  3:12 PM EDT -----  Regarding: DOROTA Holman / Hellen Robledo Message/Vendor Calls    [facility]  is requesting  an order for PT order faxed to: [phone]          Callback required   Best contact number(s): [phone]          Chucky Zambrano

## 2020-08-14 ENCOUNTER — DOCUMENTATION ONLY (OUTPATIENT)
Dept: CASE MANAGEMENT | Age: 85
End: 2020-08-14

## 2020-08-14 NOTE — ACP (ADVANCE CARE PLANNING)
[facility] [address] POST (Physician Orders for Scope of Treatment)       Date of conversation: August 14, 2020   Location: Outpt Practice   Length (minutes): 30 minutes    Participants:   [] Patient      [x] Other Surrogate Decision Maker / Next of Kin    Name: Denita Phillips     Relationship to Patient:Daughter    Phone Number: [phone]     Other persons present: N/A   Name:     Relationship to patient:   Name:     Relationship to patient:    I was able to confer with other daughter Latanya Douglass) who stated the three children have discussed pt's future care. They appointed Ingrid Cuevas to be their spoke person. I attempted to call son but voice mail was full. Jesusjose rafael Ekaterina has asked for a copy of the signed document once completed. [facility] ACP Facilitator: Pandora Sicard, LCSW      Conversation Topics   (If Patient does not have decision making capacity, Agent/Surrogate responds based on understanding of how patient would respond if capable)    Understanding of Medical Condition/s AND Potential Complications:    Patient response: N/A  Healthcare Agent/Other Surrogate: The patient has dementia and unable to have a meaningful conversation re: future healthcare instructions. Daughter stated pt just completed radiation on her back to cancer. Lesson Healy from Experiences Related to Serious Illness: This family have experience death of siblings (oldest brother/youngest sister/youngest brother). She focused on family having to discuss their father's end of life decisions even though their father was able to make decision re: comfort care. Family agreed with his decision. Identifies the following as important for living well: Pt has loved to \"gossip\" with neighbors. She still does this but repeats conversations over and over. Neighbors understand and shows her positive attention. Hopes: Daughter could not answer this question.     Worries/Fears about Medical Condition: Pt has said to all her living children she does not want to be on any machines to keep her alive if \"its her time to go. \"  She talks to the [employer] during the day. Sources of support/comfort described as: Pt and daughter live together. Friends/neighbors/siblings are very supportive. Cultural, Presybeterian, spiritual, or personal beliefs described as: Divya Fleming is very important in pt's life. Needs to discuss with spiritual/Presybeterian advisor: [] Yes  [x] No    Needs more information about illness and complications:  [] Yes  [x] No      Cardiopulmonary Resuscitation      \"What do you understand about CPR? \" Response: Children are aware of CPR and do not want their mother to have the compression, chances of having broken bones at this stage of her life. Order Elected for CPR:  []  Attempt Resuscitation [x]  Do Not Attempt Resuscitation      When NOT in Cardiopulmonary Arrest, Order Elected:      [x] Comfort Measures  [] Limited Additional Interventions  [] Full Interventions    Artificially Administered Nutrition, Order Elected: The Daughter  is not sure about this question. She wants to discuss with her two siblings. She is leaning to allow tube feedings if pt's nutritional status deteriorates. [] No Feeding Tube   [] Feeding Tube for a defined trial period  [] Feeding Tube long-term if indicated    The following was provided (check all that apply):      Healthcare Decision Information Cards:Reviewed verbally on the phone. Daughter does not have means to accept electronically. [] Help with Breathing Facts   [] Tube Feeding Facts   [] CPR Facts      [] Review of existing Advance Directive  [] Assistance with Completion of New Advance Directive   [x] Review of [place] POST Form       Meeting Outcomes:   [x] ACP discussion completed   [] [place] form completed  [x] Maribel prepared for Provider review and signature:  To be mailed to daughter for signature and she will arrange for document to sent to PCP office for signature.    [] Original placed on Chart, if in facility (form to be sent with patient at discharge)  [] Copy given to healthcare agent    [] Copy scanned to electronic medical record         Follow-up plan:     [] Schedule follow-up conversation to continue planning   [x] Referred individual to Provider for additional questions/concerns   [x] Advised patient/agent/surrogate to review completed POST form and update if needed with changes in condition, patient preferences or care setting     [x] This note routed to one or more involved healthcare providers

## 2020-08-17 ENCOUNTER — OFFICE VISIT (OUTPATIENT)
Dept: NEUROLOGY | Facility: CLINIC | Age: 85
End: 2020-08-17
Payer: MEDICARE

## 2020-08-17 VITALS
OXYGEN SATURATION: 97 % | HEART RATE: 68 BPM | TEMPERATURE: 97.8 F | DIASTOLIC BLOOD PRESSURE: 72 MMHG | WEIGHT: 117 LBS | BODY MASS INDEX: 22.09 KG/M2 | SYSTOLIC BLOOD PRESSURE: 138 MMHG | HEIGHT: 61 IN

## 2020-08-17 DIAGNOSIS — F02.80 ALZHEIMER'S DISEASE OF OTHER ONSET WITHOUT BEHAVIORAL DISTURBANCE: Primary | ICD-10-CM

## 2020-08-17 DIAGNOSIS — F03.90 MAJOR NEUROCOGNITIVE DISORDER DUE TO ALZHEIMER'S DISEASE, PROBABLE, WITHOUT BEHAVIORAL DISTURBANCE: ICD-10-CM

## 2020-08-17 DIAGNOSIS — G30.8 ALZHEIMER'S DISEASE OF OTHER ONSET WITHOUT BEHAVIORAL DISTURBANCE: Primary | ICD-10-CM

## 2020-08-17 PROCEDURE — 99214 OFFICE O/P EST MOD 30 MIN: CPT | Performed by: PSYCHIATRY & NEUROLOGY

## 2020-08-17 RX ORDER — DONEPEZIL HYDROCHLORIDE 10 MG/1
10 TABLET, FILM COATED ORAL
Qty: 90 TAB | Refills: 2 | Status: SHIPPED | OUTPATIENT
Start: 2020-08-17 | End: 2021-06-14 | Stop reason: SDUPTHER

## 2020-08-17 RX ORDER — MEMANTINE HYDROCHLORIDE 5 MG/1
5 TABLET ORAL 2 TIMES DAILY
Qty: 180 TAB | Refills: 2 | Status: SHIPPED | OUTPATIENT
Start: 2020-08-17 | End: 2021-02-24 | Stop reason: SINTOL

## 2020-08-17 RX ORDER — ANASTROZOLE 1 MG/1
TABLET ORAL
COMMUNITY
Start: 2020-05-04 | End: 2022-01-01

## 2020-08-17 NOTE — PROGRESS NOTES
Chief Complaint   Patient presents with    Dementia     follow up, here with daughter who states \"it's about the same\"       HPI    Ms. Anderson Kee is a 80-year-old woman following up. I saw her earlier this year for worsening memory loss. She had neuropsychological evaluation done confirming severe dementia. I completed MRI imaging showing bilateral hippocampal temporal lobe atrophy consistent with the diagnosis. Her daughter is present. She lives with her family. No new changes otherwise which is good. She can do her own feeding toileting and showering. She does use undergarments though for accidents. She does refuse meds occasionally but can be redirected. Sleep is good. No hallucinations. Background:  Ms. Anderson Kee is an 80-year-old woman with a history of hypertension, stroke here for memory changes. 1 of her daughters is present. Ms. Anderson Kee is a poor historian. She does not know why she is here. Family is concerned that over the past year and several months her memory is getting worse such that she cannot remember conversations. She gets lost easily after being given directions. She gets angry easily. She needs medication supervision because she forgets her medications. She lives with family. She does not drive. Ms. Anderson Kee denies any hallucinations. She has 1/9 grade education. She can do her own ADLs she tells me such as bathing, dressing, feeding. She spends most of the day sedentary but does walk with her walker sometimes. Recent blood work showing normal B12 and basic labs. Head CT unrevealing.           Review of Systems   Unable to perform ROS: Dementia       Past Medical History:   Diagnosis Date    Cancer [facility])     right breast.    Chronic pain     CVA     HTN (hypertension) 2/9/2011    Hypertension     Microscopic hematuria 5/11/2011    Other and unspecified hyperlipidemia 2/9/2011    Subclinical hyperthyroidism 8/29/2015    Venous thrombosis 2/17/2010    left arm DVT Family History   Problem Relation Age of Onset    Stroke Mother     Stroke Father     Diabetes Daughter      Social History     Socioeconomic History    Marital status:      Spouse name: Not on file    Number of children: Not on file    Years of education: Not on file    Highest education level: Not on file   Occupational History    Not on file   Social Needs    Financial resource strain: Not on file    Food insecurity     Worry: Not on file     Inability: Not on file   [employer] needs     Medical: Not on file     Non-medical: Not on file   Tobacco Use    Smoking status: Former Smoker    Smokeless tobacco: Never Used    Tobacco comment: smoked for 10 yrs   Substance and Sexual Activity    Alcohol use: Yes     Alcohol/week: 0.8 standard drinks     Types: 1 Cans of beer per week     Comment: occ    Drug use: No    Sexual activity: Not Currently   Lifestyle    Physical activity     Days per week: Not on file     Minutes per session: Not on file    Stress: Not on file   Relationships    Social connections     Talks on phone: Not on file     Gets together: Not on file     Attends Mandaeism service: Not on file     Active member of club or organization: Not on file     Attends meetings of clubs or organizations: Not on file     Relationship status: Not on file    Intimate partner violence     Fear of current or ex partner: Not on file     Emotionally abused: Not on file     Physically abused: Not on file     Forced sexual activity: Not on file   Other Topics Concern    Not on file   Social History Narrative    Not on file     No Known Allergies      Current Outpatient Medications   Medication Sig    donepeziL (ARICEPT) 10 mg tablet Take 1 Tab by mouth nightly.  memantine (NAMENDA) 5 mg tablet Take 1 Tab by mouth two (2) times a day.  methIMAzole (TAPAZOLE) 5 mg tablet Take 1 Tab by mouth daily.     lubiPROStone (Amitiza) 24 mcg capsule Take 1 Cap by mouth two (2) times daily (with meals). Indications: chronic idiopathic constipation    lisinopriL (PRINIVIL, ZESTRIL) 20 mg tablet TAKE 1 TABLET BY MOUTH EVERY DAY    diclofenac (VOLTAREN) 1 % gel Apply 2 g to affected area every six (6) hours.  amLODIPine (NORVASC) 5 mg tablet Take 1 Tab by mouth daily.  metoprolol succinate (TOPROL-XL) 50 mg XL tablet TAKE 1 TABLET BY MOUTH EVERY DAY    cholecalciferol (VITAMIN D3) (1000 Units /25 mcg) tablet Take 2 Tabs by mouth daily.  simvastatin (ZOCOR) 20 mg tablet Take 1 Tab by mouth nightly.  miscellaneous medical supply [facility] SYS JAQUELINE bed.  anastrozole (ARIMIDEX) 1 mg tablet Taken sporadically per daughter    mometasone (ELOCON) 0.1 % topical cream Apply  to affected area daily.  aspirin delayed-release 81 mg tablet Take 1 Tab by mouth daily.  anastrozole (ARIMIDEX) 1 mg tablet 1 mg = 1 tab each dose, PO, daily, # 90 tab, 2 Refills, Pharmacy: [facility]/pharmacy #[id number]   acetaminophen (Tylenol Arthritis Pain) 650 mg TbER Take 1 Tab by mouth three (3) times daily as needed for Pain. Indications: pain associated with arthritis    multivitamin (ONE A DAY) tablet Take 1 Tab by mouth daily. Indications: Treatment To Prevent Vitamin Deficiency     No current facility-administered medications for this visit. Neurologic Exam     Mental Status   WD/WN adult in NAD, normal grooming  VSS  A&O pleasant, not very talkative but she does answer questions. She knows the month but is off by the date and the year. PERRL, nonicteric  Face is symmetric, tongue midline  Speech is a little dysarthric secondary to no teeth  No limb ataxia. No abnl movements.   Moving all extemities spontaneously and symmetric  Deferred gait    CVS RRR  Lungs nonlabored  Skin is warm and dry         Visit Vitals  /72 (BP 1 Location: Left arm, BP Patient Position: Sitting)   Pulse 68   Temp 97.8 °F (36.6 °C) (Oral)   Ht 5' 1\" (1.549 m)   Wt 53.1 kg (117 lb)   SpO2 97%   BMI 22.11 kg/m² Assessment and Plan   Diagnoses and all orders for this visit:    1. Alzheimer's disease of other onset without behavioral disturbance ([facility] [address]  -     donepeziL (ARICEPT) 10 mg tablet; Take 1 Tab by mouth nightly. -     memantine (NAMENDA) 5 mg tablet; Take 1 Tab by mouth two (2) times a day. 2. Major neurocognitive disorder due to Alzheimer's disease, probable, without behavioral disturbance (HCC)  -     donepeziL (ARICEPT) 10 mg tablet; Take 1 Tab by mouth nightly. -     memantine (NAMENDA) 5 mg tablet; Take 1 Tab by mouth two (2) times a day. 77-year-old woman with dementia likely Alzheimer's type based on clinical history and imaging. She is tolerating Aricept fine. So I am going to optimize therapy by adding Namenda. Side effects discussed. We talked about the natural course of dementia how sometimes her can be sudden decline but we need to maintain routine is much as possible. Ensure sleep is good. She seems well cared for. Her safety is intact. I would like to see her in about 6 months. I reviewed and decided to continue the current medications. This clinical note was dictated with an electronic dictation software that can make unintentional errors. If there are any questions, please contact me directly for clarification.       [address] [facility], [facility] [address]  Diplomate EMMETT

## 2020-08-17 NOTE — PROGRESS NOTES
Chief Complaint   Patient presents with    Dementia     follow up, here with daughter who states \"it's about the same\"     Visit Vitals  /72 (BP 1 Location: Left arm, BP Patient Position: Sitting)   Pulse 68   Temp 97.8 °F (36.6 °C) (Oral)   Ht 5' 1\" (1.549 m)   Wt 53.1 kg (117 lb)   SpO2 97%   BMI 22.11 kg/m²

## 2020-08-26 ENCOUNTER — DOCUMENTATION ONLY (OUTPATIENT)
Dept: INTERNAL MEDICINE CLINIC | Age: 85
End: 2020-08-26

## 2020-09-09 ENCOUNTER — DOCUMENTATION ONLY (OUTPATIENT)
Dept: CASE MANAGEMENT | Age: 85
End: 2020-09-09

## 2020-10-08 ENCOUNTER — TELEPHONE (OUTPATIENT)
Dept: INTERNAL MEDICINE CLINIC | Age: 85
End: 2020-10-08

## 2020-10-08 NOTE — TELEPHONE ENCOUNTER
Patient's daughter called and stated that the patient has a dry cough and she would like to know what should she do about this. She wanted to speak with Paula Recinos. The contact number is [phone].

## 2020-11-23 ENCOUNTER — TELEPHONE (OUTPATIENT)
Dept: NEUROLOGY | Age: 85
End: 2020-11-23

## 2020-11-23 NOTE — TELEPHONE ENCOUNTER
Pt's daughter calling in regards to memantine , states every time she gives it to the pt, she complains of a headache.

## 2020-11-25 NOTE — TELEPHONE ENCOUNTER
Called and LVM for the patirosa's daughtert to have her mom stop the medication to see if the headache subsides and to call the office back to advise.

## 2020-12-15 DIAGNOSIS — I10 ESSENTIAL HYPERTENSION: ICD-10-CM

## 2020-12-15 RX ORDER — AMLODIPINE BESYLATE 5 MG/1
5 TABLET ORAL DAILY
Qty: 90 TAB | Refills: 3 | Status: SHIPPED | OUTPATIENT
Start: 2020-12-15 | End: 2021-01-01 | Stop reason: SDUPTHER

## 2020-12-15 NOTE — TELEPHONE ENCOUNTER
Pt's daughter called and said pharmacy said you refused the rx. I called and was told the last rx they reci markel was on 9/14/20 with no refills.

## 2021-01-01 ENCOUNTER — OFFICE VISIT (OUTPATIENT)
Dept: INTERNAL MEDICINE CLINIC | Age: 86
End: 2021-01-01
Payer: MEDICARE

## 2021-01-01 ENCOUNTER — TELEPHONE (OUTPATIENT)
Dept: INTERNAL MEDICINE CLINIC | Age: 86
End: 2021-01-01

## 2021-01-01 VITALS
DIASTOLIC BLOOD PRESSURE: 63 MMHG | TEMPERATURE: 98.3 F | BODY MASS INDEX: 22.3 KG/M2 | RESPIRATION RATE: 17 BRPM | HEIGHT: 61 IN | SYSTOLIC BLOOD PRESSURE: 137 MMHG | HEART RATE: 66 BPM

## 2021-01-01 DIAGNOSIS — Z00.00 MEDICARE ANNUAL WELLNESS VISIT, SUBSEQUENT: Primary | ICD-10-CM

## 2021-01-01 DIAGNOSIS — R13.10 DYSPHAGIA, UNSPECIFIED TYPE: Primary | ICD-10-CM

## 2021-01-01 DIAGNOSIS — Z13.228 SCREENING FOR ENDOCRINE, NUTRITIONAL, METABOLIC AND IMMUNITY DISORDER: ICD-10-CM

## 2021-01-01 DIAGNOSIS — M47.812 FACET ARTHROPATHY, CERVICAL: ICD-10-CM

## 2021-01-01 DIAGNOSIS — M25.562 BILATERAL CHRONIC KNEE PAIN: ICD-10-CM

## 2021-01-01 DIAGNOSIS — E05.90 SUBCLINICAL HYPERTHYROIDISM: ICD-10-CM

## 2021-01-01 DIAGNOSIS — M25.561 BILATERAL CHRONIC KNEE PAIN: ICD-10-CM

## 2021-01-01 DIAGNOSIS — I10 ESSENTIAL HYPERTENSION: ICD-10-CM

## 2021-01-01 DIAGNOSIS — Z13.29 SCREENING FOR ENDOCRINE, NUTRITIONAL, METABOLIC AND IMMUNITY DISORDER: ICD-10-CM

## 2021-01-01 DIAGNOSIS — R29.6 RECURRENT FALLS: ICD-10-CM

## 2021-01-01 DIAGNOSIS — Z13.21 SCREENING FOR ENDOCRINE, NUTRITIONAL, METABOLIC AND IMMUNITY DISORDER: ICD-10-CM

## 2021-01-01 DIAGNOSIS — G31.84 MCI (MILD COGNITIVE IMPAIRMENT): ICD-10-CM

## 2021-01-01 DIAGNOSIS — Z00.00 MEDICARE ANNUAL WELLNESS VISIT, SUBSEQUENT: ICD-10-CM

## 2021-01-01 DIAGNOSIS — I10 HTN (HYPERTENSION), BENIGN: ICD-10-CM

## 2021-01-01 DIAGNOSIS — R05.3 PERSISTENT DRY COUGH: ICD-10-CM

## 2021-01-01 DIAGNOSIS — G89.29 BILATERAL CHRONIC KNEE PAIN: ICD-10-CM

## 2021-01-01 DIAGNOSIS — Z13.0 SCREENING FOR ENDOCRINE, NUTRITIONAL, METABOLIC AND IMMUNITY DISORDER: ICD-10-CM

## 2021-01-01 DIAGNOSIS — I82.90 VENOUS THROMBOSIS: ICD-10-CM

## 2021-01-01 DIAGNOSIS — Z71.89 ADVANCE CARE PLANNING: ICD-10-CM

## 2021-01-01 DIAGNOSIS — N32.81 OVERACTIVE BLADDER: ICD-10-CM

## 2021-01-01 DIAGNOSIS — I10 ESSENTIAL HYPERTENSION: Primary | ICD-10-CM

## 2021-01-01 DIAGNOSIS — E78.2 MIXED HYPERLIPIDEMIA: ICD-10-CM

## 2021-01-01 PROCEDURE — G8432 DEP SCR NOT DOC, RNG: HCPCS | Performed by: NURSE PRACTITIONER

## 2021-01-01 PROCEDURE — 99214 OFFICE O/P EST MOD 30 MIN: CPT | Performed by: NURSE PRACTITIONER

## 2021-01-01 PROCEDURE — G8420 CALC BMI NORM PARAMETERS: HCPCS | Performed by: NURSE PRACTITIONER

## 2021-01-01 PROCEDURE — G0439 PPPS, SUBSEQ VISIT: HCPCS | Performed by: NURSE PRACTITIONER

## 2021-01-01 PROCEDURE — 1090F PRES/ABSN URINE INCON ASSESS: CPT | Performed by: NURSE PRACTITIONER

## 2021-01-01 PROCEDURE — 1101F PT FALLS ASSESS-DOCD LE1/YR: CPT | Performed by: NURSE PRACTITIONER

## 2021-01-01 PROCEDURE — G8427 DOCREV CUR MEDS BY ELIG CLIN: HCPCS | Performed by: NURSE PRACTITIONER

## 2021-01-01 PROCEDURE — G8536 NO DOC ELDER MAL SCRN: HCPCS | Performed by: NURSE PRACTITIONER

## 2021-01-01 RX ORDER — AMLODIPINE BESYLATE 5 MG/1
5 TABLET ORAL DAILY
Qty: 90 TABLET | Refills: 3 | Status: SHIPPED | OUTPATIENT
Start: 2021-01-01

## 2021-01-01 RX ORDER — LORATADINE 10 MG/1
10 TABLET ORAL DAILY
Qty: 90 TABLET | Refills: 1 | Status: SHIPPED | OUTPATIENT
Start: 2021-01-01 | End: 2022-01-01 | Stop reason: SDUPTHER

## 2021-01-01 RX ORDER — METOPROLOL SUCCINATE 50 MG/1
TABLET, EXTENDED RELEASE ORAL
Qty: 30 TABLET | Refills: 5 | Status: SHIPPED | OUTPATIENT
Start: 2021-01-01 | End: 2022-01-01 | Stop reason: SDUPTHER

## 2021-01-01 RX ORDER — GUAIFENESIN 100 MG/5ML
LIQUID (ML) ORAL
Qty: 90 TABLET | Refills: 2 | Status: SHIPPED | OUTPATIENT
Start: 2021-01-01 | End: 2022-01-01

## 2021-01-01 RX ORDER — DICLOFENAC SODIUM 10 MG/G
2 GEL TOPICAL EVERY 6 HOURS
Qty: 100 G | Refills: 2 | Status: SHIPPED | OUTPATIENT
Start: 2021-01-01 | End: 2022-01-01

## 2021-01-01 RX ORDER — METHIMAZOLE 5 MG/1
5 TABLET ORAL DAILY
Qty: 30 TABLET | Refills: 11 | Status: SHIPPED | OUTPATIENT
Start: 2021-01-01 | End: 2022-01-01 | Stop reason: DRUGHIGH

## 2021-01-04 RX ORDER — ANASTROZOLE 1 MG/1
TABLET ORAL
Refills: 3 | OUTPATIENT
Start: 2021-01-04

## 2021-01-05 ENCOUNTER — TELEPHONE (OUTPATIENT)
Dept: INTERNAL MEDICINE CLINIC | Age: 86
End: 2021-01-05

## 2021-01-05 NOTE — TELEPHONE ENCOUNTER
Pt daughter Sharmin Sun called she said the oncologist  told her to get the rx from  Her pcp and her mother needs a new mattress for hospital bed. It has been over 5 years since she got it. Need new order for the mattress

## 2021-01-05 NOTE — TELEPHONE ENCOUNTER
Sorry that sounds incorrect. Who is her ONCOLOGIST? Typically if the med is still need, the pt is still followed by ONCOLOGY. In regards to the mattress. She will likely need an in person evaluation for that documentation. Has she called her mother's insurance company to ensure a new mattress will be covered?

## 2021-01-06 NOTE — TELEPHONE ENCOUNTER
Spoke with pt daughter who states that she will wait until the 27th for the new order for mattress and will contact oncology about the medication

## 2021-01-27 ENCOUNTER — VIRTUAL VISIT (OUTPATIENT)
Dept: INTERNAL MEDICINE CLINIC | Age: 86
End: 2021-01-27
Payer: MEDICARE

## 2021-01-27 DIAGNOSIS — E78.2 MIXED HYPERLIPIDEMIA: ICD-10-CM

## 2021-01-27 DIAGNOSIS — E05.90 SUBCLINICAL HYPERTHYROIDISM: ICD-10-CM

## 2021-01-27 DIAGNOSIS — G31.84 MCI (MILD COGNITIVE IMPAIRMENT): ICD-10-CM

## 2021-01-27 DIAGNOSIS — I10 ESSENTIAL HYPERTENSION: Primary | ICD-10-CM

## 2021-01-27 PROCEDURE — 99441 PR PHYS/QHP TELEPHONE EVALUATION 5-10 MIN: CPT | Performed by: NURSE PRACTITIONER

## 2021-01-27 RX ORDER — MELATONIN
2000 DAILY
Qty: 180 TAB | Refills: 3 | Status: SHIPPED | OUTPATIENT
Start: 2021-01-27 | End: 2022-01-01

## 2021-01-27 RX ORDER — SIMVASTATIN 20 MG/1
20 TABLET, FILM COATED ORAL
Qty: 90 TAB | Refills: 3 | Status: SHIPPED | OUTPATIENT
Start: 2021-01-27 | End: 2022-01-01 | Stop reason: SDUPTHER

## 2021-01-27 NOTE — PATIENT INSTRUCTIONS
Medication Refill: Care Instructions Your Care Instructions Medicines are a big part of treatment for many health problems. So it can be upsetting to run out of your medicine. It may even be dangerous to stop a medicine suddenly. The doctor may have given you enough medicine to help you until you can see your regular doctor. The doctor has checked you carefully, but problems can develop later. If you notice any problems or new symptoms, get medical treatment right away. Follow-up care is a key part of your treatment and safety. Be sure to make and go to all appointments, and call your doctor if you are having problems. It's also a good idea to know your test results and keep a list of the medicines you take. How can you care for yourself at home? · Be safe with medicines. Take your medicines exactly as prescribed. · Know when you will run out of your medicine. Use a calendar to remind you to get a refill. Don't wait until you have only a few pills left. · Talk with your doctor or pharmacist about your medicine. Find out what to do if you miss a dose. When should you call for help? Call your doctor now or seek immediate medical care if: 
  · You have problems with your medicine. Watch closely for changes in your health, and be sure to contact your doctor if you have any problems. Where can you learn more? Go to http://www.gray.com/ Enter K326 in the search box to learn more about \"Medication Refill: Care Instructions. \" Current as of: August 22, 2019               Content Version: 12.6 © 3104-9563 [employer]. Care instructions adapted under license by [employer] (which disclaims liability or warranty for this information). If you have questions about a medical condition or this instruction, always ask your healthcare professional. [address] any warranty or liability for your use of this information.

## 2021-01-27 NOTE — PROGRESS NOTES
Kathe Lucas is a 80 y.o. female evaluated via audio only technology on 1/27/2021. Consent: She and/or her health care decision maker is aware that she may receive a bill for this audio only encounter, depending on her insurance coverage, and has provided verbal consent to proceed: Yes    I communicated with the patient and/or health care decision maker about the nature and details of the following:  Assessment & Plan:   Diagnoses and all orders for this visit:    1. Essential hypertension    2. Mixed hyperlipidemia  -     simvastatin (ZOCOR) 20 mg tablet; Take 1 Tab by mouth nightly. 3. MCI (mild cognitive impairment)    4. Subclinical hyperthyroidism    Other orders  -     cholecalciferol (Vitamin D3) (1000 Units /25 mcg) tablet; Take 2 Tabs by mouth daily. Pt instructed to ensure she has appt to see ENDO and have TSH followed up. Encouraged to keep a log of meds taken since seemingly still some discrepancy about methimazole dosing. Taken daily, but something has MWF still also. Advised to confirm with ENDO what dosing is wanted and last TSH level. Follow-up and Dispositions    · Return in about 6 months (around 7/27/2021) for OV- HTN, annua labs. 12  Subjective:   Kathe Lucas is a 80 y.o. female who was seen for Follow-up (6 month [phone])      Pt and daughter present to f/u HTN. Still followed by ENDO. Taking methimazole daily now. Feels good overall, unsure of BPs since not checked lately. Daughter can't find cuff lately. No acute complaints otherwise. Denies side effects from medication. No report of headaches, blurring vision, CP, SOB,or  leg swelling. Prior to Admission medications    Medication Sig Start Date End Date Taking? Authorizing Provider   simvastatin (ZOCOR) 20 mg tablet Take 1 Tab by mouth nightly. 1/27/21  Yes Nii Chaudhari NP   cholecalciferol (Vitamin D3) (1000 Units /25 mcg) tablet Take 2 Tabs by mouth daily.  1/27/21  Yes Mansi Hollingsworth NP amLODIPine (NORVASC) 5 mg tablet Take 1 Tab by mouth daily. 12/15/20   Cleopatra Sparks NP   linaCLOtide (Linzess) 145 mcg cap capsule Take 1 Cap by mouth Daily (before breakfast). With WATER only, 30 minutes before breakfast. 9/3/20   Cleopatra Sparks NP   anastrozole (ARIMIDEX) 1 mg tablet 1 mg = 1 tab each dose, PO, daily, # 90 tab, 2 Refills, Pharmacy: [facility]/pharmacy #[id number]/4/20   Provider, Historical   donepeziL (ARICEPT) 10 mg tablet Take 1 Tab by mouth nightly. 8/17/20   Josiah Meng DO   memantine (NAMENDA) 5 mg tablet Take 1 Tab by mouth two (2) times a day. 8/17/20   Josiah Meng DO   methIMAzole (TAPAZOLE) 5 mg tablet Take 1 Tab by mouth daily. 7/29/20   Cleopatra Sparks NP   lisinopriL (PRINIVIL, ZESTRIL) 20 mg tablet TAKE 1 TABLET BY MOUTH EVERY DAY 7/28/20   Cleopatra Sparks NP   diclofenac (VOLTAREN) 1 % gel Apply 2 g to affected area every six (6) hours. 7/28/20   Cleopatra Sparks NP   acetaminophen (Tylenol Arthritis Pain) 650 mg TbER Take 1 Tab by mouth three (3) times daily as needed for Pain. Indications: pain associated with arthritis 7/28/20   Cleopatra Sparks NP   metoprolol succinate (TOPROL-XL) 50 mg XL tablet TAKE 1 TABLET BY MOUTH EVERY DAY 5/9/20   Cleopatra Sparks NP   cholecalciferol (VITAMIN D3) (1000 Units /25 mcg) tablet Take 2 Tabs by mouth daily. 12/18/19 1/27/21  Cleopatra Sparks NP   simvastatin (ZOCOR) 20 mg tablet Take 1 Tab by mouth nightly. 12/18/19 1/27/21  Cleopatra Sparks NP   multivitamin (ONE A DAY) tablet Take 1 Tab by mouth daily. Indications: Treatment To Prevent Vitamin Deficiency 6/17/19   Cleopatra Sparks NP   miscellaneous medical supply [facility] SYS JAQUELINE bed. 1/18/19   Cleopatra Sparks NP   anastrozole (ARIMIDEX) 1 mg tablet Taken sporadically per daughter 3/13/17 1/27/21  Provider, Historical   mometasone (ELOCON) 0.1 % topical cream Apply  to affected area daily. 7/25/16 1/27/21  Cleopatra Sparks, NP   aspirin delayed-release 81 mg tablet Take 1 Tab by mouth daily. 12/8/15   Azalia Ramos MD     No Known Allergies    Patient Active Problem List   Diagnosis Code    Pain R52    Venous thrombosis I82.90    UTI (lower urinary tract infection) N39.0    Frequent urination R35.0    Cramps, extremity R25.2    Overactive bladder N32.81    Hyperlipidemia E78.5    Microscopic hematuria R31.29    Urinary frequency R35.0    HTN (hypertension), benign I10    Pure hypercholesterolemia E78.00    Subclinical hyperthyroidism E05.90    Bilateral chronic knee pain M25.561, M25.562, G89.29    Pain medication agreement signed Z02.89    Major neurocognitive disorder due to Alzheimer's disease, probable, without behavioral disturbance ([facility] [address] F01.50     Patient Active Problem List    Diagnosis Date Noted    Major neurocognitive disorder due to Alzheimer's disease, probable, without behavioral disturbance [facility] 08/12/2020    Pain medication agreement signed 07/17/2017    Bilateral chronic knee pain 09/16/2016    Subclinical hyperthyroidism 08/29/2015    HTN (hypertension), benign 08/28/2015    Pure hypercholesterolemia 08/28/2015    Urinary frequency 11/13/2012    Microscopic hematuria 05/11/2011    Hyperlipidemia 02/09/2011    Overactive bladder 01/17/2011    Cramps, extremity 12/20/2010    UTI (lower urinary tract infection) 08/25/2010    Frequent urination 08/25/2010    Venous thrombosis 02/17/2010    Pain 02/12/2010     Current Outpatient Medications   Medication Sig Dispense Refill    simvastatin (ZOCOR) 20 mg tablet Take 1 Tab by mouth nightly. 90 Tab 3    cholecalciferol (Vitamin D3) (1000 Units /25 mcg) tablet Take 2 Tabs by mouth daily. 180 Tab 3    amLODIPine (NORVASC) 5 mg tablet Take 1 Tab by mouth daily. 90 Tab 3    linaCLOtide (Linzess) 145 mcg cap capsule Take 1 Cap by mouth Daily (before breakfast).  With WATER only, 30 minutes before breakfast. 30 Cap 11    anastrozole (ARIMIDEX) 1 mg tablet 1 mg = 1 tab each dose, PO, daily, # 90 tab, 2 Refills, Pharmacy: [facility]/pharmacy #[id number]     donepeziL (ARICEPT) 10 mg tablet Take 1 Tab by mouth nightly. 90 Tab 2    memantine (NAMENDA) 5 mg tablet Take 1 Tab by mouth two (2) times a day. 180 Tab 2    methIMAzole (TAPAZOLE) 5 mg tablet Take 1 Tab by mouth daily. 30 Tab 11    lisinopriL (PRINIVIL, ZESTRIL) 20 mg tablet TAKE 1 TABLET BY MOUTH EVERY DAY 90 Tab 3    diclofenac (VOLTAREN) 1 % gel Apply 2 g to affected area every six (6) hours. 100 g 2    acetaminophen (Tylenol Arthritis Pain) 650 mg TbER Take 1 Tab by mouth three (3) times daily as needed for Pain. Indications: pain associated with arthritis 100 Tab 3    metoprolol succinate (TOPROL-XL) 50 mg XL tablet TAKE 1 TABLET BY MOUTH EVERY DAY 90 Tab 3    multivitamin (ONE A DAY) tablet Take 1 Tab by mouth daily. Indications: Treatment To Prevent Vitamin Deficiency 90 Tab 3    miscellaneous medical supply [facility] SYS JAQUELINE bed. 1 Each 0    aspirin delayed-release 81 mg tablet Take 1 Tab by mouth daily. 80 Tab 3     No Known Allergies  Past Medical History:   Diagnosis Date    Cancer ([address]     right breast.    Chronic pain     CVA     HTN (hypertension) 2/9/2011    Hypertension     Microscopic hematuria 5/11/2011    Other and unspecified hyperlipidemia 2/9/2011    Subclinical hyperthyroidism 8/29/2015    Venous thrombosis 2/17/2010    left arm DVT     Past Surgical History:   Procedure Laterality Date    HX HEENT      HX HEMORRHOIDECTOMY      IA BREAST SURGERY PROCEDURE UNLISTED       Family History   Problem Relation Age of Onset    Stroke Mother     Stroke Father     Diabetes Daughter      Social History     Tobacco Use    Smoking status: Former Smoker    Smokeless tobacco: Never Used    Tobacco comment: smoked for 10 yrs   Substance Use Topics    Alcohol use:  Yes     Alcohol/week: 0.8 standard drinks     Types: 1 Cans of beer per week     Comment: occ       ROS    I affirm this is a Patient-Initiated Episode with a Patient who has not had a related appointment within my department in the past 7 days or scheduled within the next 24 hours.     Total Time: minutes: 5-10 minutes    Note: not billable if this call serves to triage the patient into an appointment for the relevant concern      Tiffany Love NP

## 2021-01-27 NOTE — PROGRESS NOTES
Pt is here for   Chief Complaint   Patient presents with    Follow-up     6 month [phone]     Pt denies pain at this time    1. Have you been to the ER, urgent care clinic since your last visit? Hospitalized since your last visit? No    2. Have you seen or consulted any other health care providers outside of the [address] [place] [address] since your last visit? Include any pap smears or colon screening.  No

## 2021-02-24 ENCOUNTER — OFFICE VISIT (OUTPATIENT)
Dept: NEUROLOGY | Age: 86
End: 2021-02-24
Payer: MEDICARE

## 2021-02-24 VITALS
HEART RATE: 66 BPM | OXYGEN SATURATION: 97 % | SYSTOLIC BLOOD PRESSURE: 138 MMHG | HEIGHT: 61 IN | WEIGHT: 124 LBS | BODY MASS INDEX: 23.41 KG/M2 | DIASTOLIC BLOOD PRESSURE: 64 MMHG

## 2021-02-24 DIAGNOSIS — T88.7XXA SIDE EFFECT OF MEDICATION: ICD-10-CM

## 2021-02-24 DIAGNOSIS — G30.8 ALZHEIMER'S DISEASE OF OTHER ONSET WITHOUT BEHAVIORAL DISTURBANCE: Primary | ICD-10-CM

## 2021-02-24 DIAGNOSIS — F02.80 ALZHEIMER'S DISEASE OF OTHER ONSET WITHOUT BEHAVIORAL DISTURBANCE: Primary | ICD-10-CM

## 2021-02-24 DIAGNOSIS — Z71.89 COUNSELING REGARDING ADVANCE DIRECTIVES AND GOALS OF CARE: ICD-10-CM

## 2021-02-24 PROCEDURE — 99483 ASSMT & CARE PLN PT COG IMP: CPT | Performed by: PSYCHIATRY & NEUROLOGY

## 2021-02-24 PROCEDURE — G8510 SCR DEP NEG, NO PLAN REQD: HCPCS | Performed by: PSYCHIATRY & NEUROLOGY

## 2021-02-24 NOTE — PROGRESS NOTES
Chief Complaint   Patient presents with    Alzheimers     Follow up, here with daughter who states \"it is about the same,\"     Visit Vitals  /64 (BP 1 Location: Left upper arm, BP Patient Position: Sitting)   Pulse 66   Ht 5' 1\" (1.549 m)   Wt 124 lb (56.2 kg)   SpO2 97%   BMI 23.43 kg/m²

## 2021-02-24 NOTE — PROGRESS NOTES
Chief Complaint   Patient presents with    Alzheimers     Follow up, here with daughter who states \"it is about the same,\"       HPI      Ms. Kim Grady is a 80-year-old woman following up for dementia likely Alzheimer's. She has abnormal imaging and neuropsychological testing. When I saw her last we started low-dose memantine to see if that could help slow progression of symptoms. Unfortunately she is having some mild headaches from the medication. She is tolerating donepezil fine. I spoke with her caregiver who is family present in the room. According to family her sleep is good. No hallucinations. She does have some depression but she is not isolating herself. She still tries to engage her family albeit she can be very angry at times. Sometimes she can be a little aggressive but there is been no safety issues. Sometimes she refuses certain medications. She can do her own dressing and feeding but she does use undergarments for incontinence. No seizure activity reported. MRI imaging showing bilateral hippocampal temporal lobe atrophy consistent with the diagnosis. Background:  Ms. Kim Grady is an 80-year-old woman with a history of hypertension, stroke here for memory changes. 1 of her daughters is present. Ms. Kim Grady is a poor historian. She does not know why she is here. Family is concerned that over the past year and several months her memory is getting worse such that she cannot remember conversations. She gets lost easily after being given directions. She gets angry easily. She needs medication supervision because she forgets her medications. She lives with family. She does not drive. Ms. Kim Grady denies any hallucinations. She has 1/9 grade education. She can do her own ADLs she tells me such as bathing, dressing, feeding. She spends most of the day sedentary but does walk with her walker sometimes. Recent blood work showing normal B12 and basic labs.   Head CT unrevealing. Review of Systems   Unable to perform ROS: Dementia       Past Medical History:   Diagnosis Date    Cancer [facility])     right breast.    Chronic pain     CVA     HTN (hypertension) 2/9/2011    Hypertension     Microscopic hematuria 5/11/2011    Other and unspecified hyperlipidemia 2/9/2011    Subclinical hyperthyroidism 8/29/2015    Venous thrombosis 2/17/2010    left arm DVT     Family History   Problem Relation Age of Onset    Stroke Mother     Stroke Father     Diabetes Daughter      Social History     Socioeconomic History    Marital status:      Spouse name: Not on file    Number of children: Not on file    Years of education: Not on file    Highest education level: Not on file   Occupational History    Not on file   Social Needs    Financial resource strain: Not on file    Food insecurity     Worry: Not on file     Inability: Not on file    Transportation needs     Medical: Not on file     Non-medical: Not on file   Tobacco Use    Smoking status: Former Smoker    Smokeless tobacco: Never Used    Tobacco comment: smoked for 10 yrs   Substance and Sexual Activity    Alcohol use:  Yes     Alcohol/week: 0.8 standard drinks     Types: 1 Cans of beer per week     Comment: occ    Drug use: No    Sexual activity: Not Currently   Lifestyle    Physical activity     Days per week: Not on file     Minutes per session: Not on file    Stress: Not on file   Relationships    Social connections     Talks on phone: Not on file     Gets together: Not on file     Attends [employer] service: Not on file     Active member of club or organization: Not on file     Attends meetings of clubs or organizations: Not on file     Relationship status: Not on file    Intimate partner violence     Fear of current or ex partner: Not on file     Emotionally abused: Not on file     Physically abused: Not on file     Forced sexual activity: Not on file   Other Topics Concern    Not on file Social History Narrative    Not on file     No Known Allergies      Current Outpatient Medications   Medication Sig    simvastatin (ZOCOR) 20 mg tablet Take 1 Tab by mouth nightly.  cholecalciferol (Vitamin D3) (1000 Units /25 mcg) tablet Take 2 Tabs by mouth daily.  amLODIPine (NORVASC) 5 mg tablet Take 1 Tab by mouth daily.  linaCLOtide (Linzess) 145 mcg cap capsule Take 1 Cap by mouth Daily (before breakfast). With WATER only, 30 minutes before breakfast.    anastrozole (ARIMIDEX) 1 mg tablet 1 mg = 1 tab each dose, PO, daily, # 90 tab, 2 Refills, Pharmacy: [facility]/pharmacy #[id number]   donepeziL (ARICEPT) 10 mg tablet Take 1 Tab by mouth nightly.  methIMAzole (TAPAZOLE) 5 mg tablet Take 1 Tab by mouth daily.  lisinopriL (PRINIVIL, ZESTRIL) 20 mg tablet TAKE 1 TABLET BY MOUTH EVERY DAY    diclofenac (VOLTAREN) 1 % gel Apply 2 g to affected area every six (6) hours.  acetaminophen (Tylenol Arthritis Pain) 650 mg TbER Take 1 Tab by mouth three (3) times daily as needed for Pain. Indications: pain associated with arthritis    metoprolol succinate (TOPROL-XL) 50 mg XL tablet TAKE 1 TABLET BY MOUTH EVERY DAY    multivitamin (ONE A DAY) tablet Take 1 Tab by mouth daily. Indications: Treatment To Prevent Vitamin Deficiency    [facility].  aspirin delayed-release 81 mg tablet Take 1 Tab by mouth daily. No current facility-administered medications for this visit. Neurologic Exam     Mental Status   WD/WN adult in NAD, normal grooming  VSS  A&O , follows commands, she is pleasant most of the time but can get irritable easily. She thinks it is March and Thursday which are incorrect but she does have the year correct. PERRL, nonicteric  Face is grossly symmetric, tongue midline  Speech is a little dysarthric which is her baseline due to dental issues  No limb ataxia. No abnl movements.   Moving all extemities spontaneously and symmetric  Deferred gait             Visit Vitals  /64 (BP 1 Location: Left upper arm, BP Patient Position: Sitting)   Pulse 66   Ht 5' 1\" (1.549 m)   Wt 124 lb (56.2 kg)   SpO2 97%   BMI 23.43 kg/m²       Assessment and Plan   Diagnoses and all orders for this visit:    1. Alzheimer's disease of other onset without behavioral disturbance ([facility] [address]    2. Counseling regarding advance directives and goals of care    3. Side effect of medication      80year-old woman who has cognitive degeneration likely Alzheimer's based on imaging and testing. Overall her condition seems stable without much decline. It does sound like though she is having more behavioral issues and sometimes can get aggressive swinging at times. It sounds like safety is okay and intact for patient and family. I discussed that if safety becomes a concern he may need to contact 911 for assistance. She is not having any hallucinations. Defer any antipsychotics. Stop memantine out of concern for headache and only continue donepezil. Optimize sleep and routine is much as possible. We talked about goals of care. There is an advanced directive in place but unclear if there is a power of  or will. The patient made it clear to me that she would not want any artificial means to maintain or prolong life if there was no benefit or good outcome. I discussed with family the need to make sure they have the proper documentation in place. Ms. Donald Horton does not drive. She seems well cared for. Community resources are available but difficult to obtain at this time due to the pandemic. I would like to see her in about 6 or 7 months. Call with any questions. I reviewed and decided to continue the current medications. This clinical note was dictated with an electronic dictation software that can make unintentional errors. If there are any questions, please contact me directly for clarification.       [address] [facility], [facility] [address]  Diplomate EMMETT

## 2021-04-21 DIAGNOSIS — M47.812 FACET ARTHROPATHY, CERVICAL: ICD-10-CM

## 2021-04-21 DIAGNOSIS — M25.562 BILATERAL CHRONIC KNEE PAIN: ICD-10-CM

## 2021-04-21 DIAGNOSIS — G89.29 BILATERAL CHRONIC KNEE PAIN: ICD-10-CM

## 2021-04-21 DIAGNOSIS — M25.561 BILATERAL CHRONIC KNEE PAIN: ICD-10-CM

## 2021-04-21 RX ORDER — DICLOFENAC SODIUM 10 MG/G
2 GEL TOPICAL EVERY 6 HOURS
Qty: 100 G | Refills: 2 | Status: SHIPPED | OUTPATIENT
Start: 2021-04-21 | End: 2021-01-01 | Stop reason: SDUPTHER

## 2021-04-21 NOTE — TELEPHONE ENCOUNTER
[facility] left a voice message on behalf of the pt for a refill. Last visit 01/27/2021 Virtual visit DOROTA Leary   Next appointment 07/27/2021 DOROTA Chaudhari   Previous refill encounter(s)   07/28/2020 Voltaren Gel #100 grams with 2 refills     Requested Prescriptions     Pending Prescriptions Disp Refills    diclofenac (VOLTAREN) 1 % gel 100 g 2     Sig: Apply 2 g to affected area every six (6) hours.

## 2021-05-21 RX ORDER — METOPROLOL SUCCINATE 50 MG/1
50 TABLET, EXTENDED RELEASE ORAL DAILY
Qty: 90 TABLET | Refills: 1 | Status: SHIPPED | OUTPATIENT
Start: 2021-05-21 | End: 2021-01-01

## 2021-05-21 NOTE — TELEPHONE ENCOUNTER
[facility] left a voice message requesting a refill on pt's behalf. Last visit 01/27/2021 Virtual visit NP Harjeet Moore   Next appointment 07/27/2021 DOROTA Chaudhari   Previous refill encounter(s)   05/09/2020 Toprol-XL #90 with 3 refills     Requested Prescriptions     Pending Prescriptions Disp Refills    metoprolol succinate (TOPROL-XL) 50 mg XL tablet 90 Tablet 1     Sig: Take 1 Tablet by mouth daily.

## 2021-06-14 ENCOUNTER — TELEPHONE (OUTPATIENT)
Dept: INTERNAL MEDICINE CLINIC | Age: 86
End: 2021-06-14

## 2021-06-14 ENCOUNTER — OFFICE VISIT (OUTPATIENT)
Dept: NEUROLOGY | Age: 86
End: 2021-06-14
Payer: MEDICARE

## 2021-06-14 VITALS
HEIGHT: 61 IN | BODY MASS INDEX: 22.28 KG/M2 | DIASTOLIC BLOOD PRESSURE: 72 MMHG | OXYGEN SATURATION: 98 % | WEIGHT: 118 LBS | HEART RATE: 64 BPM | SYSTOLIC BLOOD PRESSURE: 138 MMHG

## 2021-06-14 DIAGNOSIS — F03.90 MAJOR NEUROCOGNITIVE DISORDER DUE TO ALZHEIMER'S DISEASE, PROBABLE, WITHOUT BEHAVIORAL DISTURBANCE: ICD-10-CM

## 2021-06-14 DIAGNOSIS — G30.9 ALZHEIMER DISEASE (HCC): ICD-10-CM

## 2021-06-14 DIAGNOSIS — G30.8 ALZHEIMER'S DEMENTIA OF OTHER ONSET WITH BEHAVIORAL DISTURBANCE: Primary | ICD-10-CM

## 2021-06-14 DIAGNOSIS — F02.818 ALZHEIMER'S DEMENTIA OF OTHER ONSET WITH BEHAVIORAL DISTURBANCE: Primary | ICD-10-CM

## 2021-06-14 DIAGNOSIS — F02.80 ALZHEIMER DISEASE (HCC): ICD-10-CM

## 2021-06-14 PROCEDURE — G8536 NO DOC ELDER MAL SCRN: HCPCS | Performed by: PSYCHIATRY & NEUROLOGY

## 2021-06-14 PROCEDURE — G8420 CALC BMI NORM PARAMETERS: HCPCS | Performed by: PSYCHIATRY & NEUROLOGY

## 2021-06-14 PROCEDURE — 1090F PRES/ABSN URINE INCON ASSESS: CPT | Performed by: PSYCHIATRY & NEUROLOGY

## 2021-06-14 PROCEDURE — G8432 DEP SCR NOT DOC, RNG: HCPCS | Performed by: PSYCHIATRY & NEUROLOGY

## 2021-06-14 PROCEDURE — G8427 DOCREV CUR MEDS BY ELIG CLIN: HCPCS | Performed by: PSYCHIATRY & NEUROLOGY

## 2021-06-14 PROCEDURE — 99215 OFFICE O/P EST HI 40 MIN: CPT | Performed by: PSYCHIATRY & NEUROLOGY

## 2021-06-14 PROCEDURE — 1101F PT FALLS ASSESS-DOCD LE1/YR: CPT | Performed by: PSYCHIATRY & NEUROLOGY

## 2021-06-14 RX ORDER — TRAZODONE HYDROCHLORIDE 50 MG/1
50 TABLET ORAL
Qty: 90 TABLET | Refills: 2 | Status: SHIPPED | OUTPATIENT
Start: 2021-06-14 | End: 2022-01-01

## 2021-06-14 RX ORDER — DONEPEZIL HYDROCHLORIDE 10 MG/1
10 TABLET, FILM COATED ORAL
Qty: 90 TABLET | Refills: 2 | Status: SHIPPED | OUTPATIENT
Start: 2021-06-14 | End: 2022-01-01

## 2021-06-14 NOTE — TELEPHONE ENCOUNTER
Pt's daughter Sunday Poster called and states her mother is having pain in her back and shoulder. She wants to know if you think she should take her to ER here @ [facility] or [facility].  She has appt today with Dr. Citlali Tinoco (neurologist) at 11:00 am.  Gertrudis's # [phone]

## 2021-06-14 NOTE — PROGRESS NOTES
Chief Complaint   Patient presents with    Follow-up     Alzheimer's disease, here with daughter       HPI          Ms. Coby Chávez is a 80-year-old woman following up for dementia likely Alzheimer's. She has abnormal imaging and neuropsychological testing. Since I saw her last we stopped memantine due to side effects of headache. She feels better. She is on Aricept daily. I spoke with both of her daughters one in person in 1 by phone. It sounds like she is having more combative behavior sometimes refusing her medications. She is not physically aggressive but verbally aggressive. She has a tendency to not eat her meals but will instead gravitate towards sweet food. She can do her own ADLs like toileting and bathing but she resists any supervision. She spends all day sedentary in her wheelchair but she is able to stand and transfer to the sink easily independently. Sleep has been somewhat inconsistent in recent weeks. Having some hallucinations talking to family members who have passed. MRI imaging showing bilateral hippocampal temporal lobe atrophy consistent with the diagnosis. Recently she is having a lot of mid back and left shoulder pain. No trauma. Background:  Ms. Coby Chávez is an 80-year-old woman with a history of hypertension, stroke here for memory changes. 1 of her daughters is present. Ms. Coby Chávez is a poor historian. She does not know why she is here. Family is concerned that over the past year and several months her memory is getting worse such that she cannot remember conversations. She gets lost easily after being given directions. She gets angry easily. She needs medication supervision because she forgets her medications. She lives with family. She does not drive. Ms. Coby Chávez denies any hallucinations. She has 1/9 grade education. She can do her own ADLs she tells me such as bathing, dressing, feeding.   She spends most of the day sedentary but does walk with her walker sometimes. Recent blood work showing normal B12 and basic labs. Head CT unrevealing. Review of Systems   Unable to perform ROS: Dementia   Musculoskeletal: Positive for back pain, joint pain, myalgias and neck pain. Psychiatric/Behavioral: Positive for memory loss. Past Medical History:   Diagnosis Date    Cancer [facility])     right breast.    Chronic pain     CVA     HTN (hypertension) 2/9/2011    Hypertension     Microscopic hematuria 5/11/2011    Other and unspecified hyperlipidemia 2/9/2011    Subclinical hyperthyroidism 8/29/2015    Venous thrombosis 2/17/2010    left arm DVT     Family History   Problem Relation Age of Onset    Stroke Mother     Stroke Father     Diabetes Daughter      Social History     Socioeconomic History    Marital status:      Spouse name: Not on file    Number of children: Not on file    Years of education: Not on file    Highest education level: Not on file   Occupational History    Not on file   Tobacco Use    Smoking status: Former Smoker    Smokeless tobacco: Never Used    Tobacco comment: smoked for 10 yrs   Vaping Use    Vaping Use: Never used   Substance and Sexual Activity    Alcohol use: Yes     Alcohol/week: 0.8 standard drinks     Types: 1 Cans of beer per week     Comment: occ    Drug use: No    Sexual activity: Not Currently   Other Topics Concern    Not on file   Social History Narrative    Not on file     Social Determinants of Health     Financial Resource Strain:     Difficulty of Paying Living Expenses:    Food Insecurity:     Worried About Running Out of Food in the Last Year:     Ran Out of Food in the Last Year:    Transportation Needs:     Lack of Transportation (Medical):      Lack of Transportation (Non-Medical):    Physical Activity:     Days of Exercise per Week:     Minutes of Exercise per Session:    Stress:     Feeling of Stress :    Social Connections:     Frequency of Communication with Friends and Family:     Frequency of Social Gatherings with Friends and Family:     Attends Congregational Services:     Active Member of Clubs or Organizations:     Attends Club or Organization Meetings:     Marital Status:    Intimate Partner Violence:     Fear of Current or Ex-Partner:     Emotionally Abused:     Physically Abused:     Sexually Abused:      No Known Allergies      Current Outpatient Medications   Medication Sig    donepeziL (ARICEPT) 10 mg tablet Take 1 Tablet by mouth nightly.  traZODone (DESYREL) 50 mg tablet Take 1 Tablet by mouth nightly. For sleep    metoprolol succinate (TOPROL-XL) 50 mg XL tablet Take 1 Tablet by mouth daily.  diclofenac (VOLTAREN) 1 % gel Apply 2 g to affected area every six (6) hours.  simvastatin (ZOCOR) 20 mg tablet Take 1 Tab by mouth nightly.  cholecalciferol (Vitamin D3) (1000 Units /25 mcg) tablet Take 2 Tabs by mouth daily.  amLODIPine (NORVASC) 5 mg tablet Take 1 Tab by mouth daily.  linaCLOtide (Linzess) 145 mcg cap capsule Take 1 Cap by mouth Daily (before breakfast). With WATER only, 30 minutes before breakfast.    anastrozole (ARIMIDEX) 1 mg tablet 1 mg = 1 tab each dose, PO, daily, # 90 tab, 2 Refills, Pharmacy: [facility] #[id number]   methIMAzole (TAPAZOLE) 5 mg tablet Take 1 Tab by mouth daily.  lisinopriL (PRINIVIL, ZESTRIL) 20 mg tablet TAKE 1 TABLET BY MOUTH EVERY DAY    acetaminophen (Tylenol Arthritis Pain) 650 mg TbER Take 1 Tab by mouth three (3) times daily as needed for Pain. Indications: pain associated with arthritis    multivitamin (ONE A DAY) tablet Take 1 Tab by mouth daily. Indications: Treatment To Prevent Vitamin Deficiency    misc[facility] supply [facility].  aspirin delayed-release 81 mg tablet Take 1 Tab by mouth daily. No current facility-administered medications for this visit.            Neurologic Exam     Mental Status   WD/WN adult in NAD, normal grooming  VSS  A&O x talkative but very irritable sometimes cursing her family under her breath while they talk about her on the phone. She is mostly pleasant with me following commands well. She does repeat her complaints more than a few times forgetting she had already mentioned it. PERRL, nonicteric  Face is symmetric, tongue midline  Speech is dysarthric-baseline  No limb ataxia. No abnl movements. Moving all extemities spontaneously and symmetric  Sitting in a wheelchair easily crossing one leg or the other  Deferred gait           Visit Vitals  /72 (BP 1 Location: Left upper arm, BP Patient Position: Sitting)   Pulse 64   Ht 5' 1\" (1.549 m)   Wt 118 lb (53.5 kg)   SpO2 98%   BMI 22.30 kg/m²       Assessment and Plan   Diagnoses and all orders for this visit:    1. Alzheimer's dementia of other onset with behavioral disturbance ([facility] [address]    2. Major neurocognitive disorder due to Alzheimer's disease, probable, without behavioral disturbance (HCC)  -     donepeziL (ARICEPT) 10 mg tablet; Take 1 Tablet by mouth nightly. 3. Alzheimer disease ([address]  -     donepeziL (ARICEPT) 10 mg tablet; Take 1 Tablet by mouth nightly. Other orders  -     traZODone (DESYREL) 50 mg tablet; Take 1 Tablet by mouth nightly. For sleep      80-year-old woman who has dementia likely Alzheimer's with frontal temporal component who is overall stable as far as doing her own ADLs however her behavior is getting a little bit more challenging. I offered Seroquel but after discussing the black box warning family declined. We are going to target her sleep better with trazodone to see if that might help daytime behavior better. Frequent gentle redirection as possible. If physical safety becomes an issue, I instructed family to contact 911 for immediate assistance if needed. She is also having a lot of pain to the left shoulder which might be from physical activity. I do not think that is stroke. Stroke typically presents with a lack of pain.   I recommend some hot compresses to the left shoulder for now and then follow-up with primary care. Continue Aricept. I would like to see her in the spring. 45 minutes of time was spent reviewing the medical record today to include face-to-face time with the patient, speaking to the daughter in person and another daughter on the phone, completion of documentation. I reviewed and decided to continue the current medications. This clinical note was dictated with an electronic dictation software that can make unintentional errors. If there are any questions, please contact me directly for clarification.       [address] [place] [address], [facility] [address]  Diplomate ABPN

## 2021-06-14 NOTE — TELEPHONE ENCOUNTER
I feel she should mention at neuro appt. I can see her virtually tomorrow if not addressed or referred back to me. She needs to be sure to TREAT the pain with Tylenol or similar med also.

## 2021-06-14 NOTE — PROGRESS NOTES
Chief Complaint   Patient presents with    Follow-up     Alzheimer's disease, here with daughter     Visit Vitals  /72 (BP 1 Location: Left upper arm, BP Patient Position: Sitting)   Pulse 64   Ht 5' 1\" (1.549 m)   Wt 118 lb (53.5 kg)   SpO2 98%   BMI 22.30 kg/m²

## 2021-06-15 ENCOUNTER — VIRTUAL VISIT (OUTPATIENT)
Dept: INTERNAL MEDICINE CLINIC | Age: 86
End: 2021-06-15
Payer: MEDICARE

## 2021-06-15 DIAGNOSIS — M25.512 ACUTE PAIN OF LEFT SHOULDER: ICD-10-CM

## 2021-06-15 DIAGNOSIS — M54.50 CHRONIC LOW BACK PAIN WITHOUT SCIATICA, UNSPECIFIED BACK PAIN LATERALITY: Primary | ICD-10-CM

## 2021-06-15 DIAGNOSIS — G89.29 CHRONIC LOW BACK PAIN WITHOUT SCIATICA, UNSPECIFIED BACK PAIN LATERALITY: Primary | ICD-10-CM

## 2021-06-15 DIAGNOSIS — Z85.848: ICD-10-CM

## 2021-06-15 PROCEDURE — 99441 PR PHYS/QHP TELEPHONE EVALUATION 5-10 MIN: CPT | Performed by: NURSE PRACTITIONER

## 2021-06-15 NOTE — PROGRESS NOTES
Sunday Sow is a 80 y.o. female evaluated via audio only technology on 6/15/2021. Consent: She and/or her health care decision maker is aware that she may receive a bill for this audio only encounter, depending on her insurance coverage, and has provided verbal consent to proceed: Yes    I communicated with the patient and/or health care decision maker about the nature and details of the following:  Assessment & Plan:   Diagnoses and all orders for this visit:    1. Chronic low back pain without sciatica, unspecified back pain laterality  -     XR SPINE LUMB 2 OR 3 V; Future    2. History of cancer of spinal cord  -     XR SPINE LUMB 2 OR 3 V; Future  -     XR SHOULDER LT AP/LAT MIN 2 V; Future  -     XR SPINE CERV 4 OR 5 V; Future    3. Acute pain of left shoulder  -     XR SHOULDER LT AP/LAT MIN 2 V; Future  -     XR SPINE CERV 4 OR 5 V; Future      Pt instructed to continue use of current meds, may try voltaren gel also. Imaging ordered to r/o OA v Ca. Otherwise encouraged to f/up with ONC for malignancy concern and possible imaging. Follow-up and Dispositions    · Return for keep appt as planned for July. 12  Subjective:   Sunday Sow is a 80 y.o. female who was seen for Back Pain (x 3 days, used a lidocaine patch for the pain which seens to help some, Daughter Rosi Ngo is concerned because mom has a hx of cancer. Maria Guadalupe Yousif PHONE CALL [phone] ) and Shoulder Pain (x 3 days )      Pt's daughter presents today concerned for back pain. Associated with left shoulder pain. Has grunting at night and reports back and shoulder pain to others when asked how she is doing. Onset 3 days ago per pt. Using lidocaine patches and tylenol with some relief. Daughter called due to pt history of spinal cancer. Has NOT called oncology with concern however, managed at [address] [place] [address]. Unsure of last imaging or appt. Prior to Admission medications    Medication Sig Start Date End Date Taking?  Authorizing Provider donepeziL (ARICEPT) 10 mg tablet Take 1 Tablet by mouth nightly. 6/14/21  Yes Josiah Meng DO   traZODone (DESYREL) 50 mg tablet Take 1 Tablet by mouth nightly. For sleep 6/14/21  Yes Josiah Meng DO   metoprolol succinate (TOPROL-XL) 50 mg XL tablet Take 1 Tablet by mouth daily. 5/21/21  Yes Watson, [address], NP   diclofenac (VOLTAREN) 1 % gel Apply 2 g to affected area every six (6) hours. 4/21/21  Yes Watson, [address], NP   simvastatin (ZOCOR) 20 mg tablet Take 1 Tab by mouth nightly. 1/27/21  Yes Watson, [address], NP   cholecalciferol (Vitamin D3) (1000 Units /25 mcg) tablet Take 2 Tabs by mouth daily. 1/27/21  Yes Watson, [address], NP   amLODIPine (NORVASC) 5 mg tablet Take 1 Tab by mouth daily. 12/15/20  Yes Watson, [address], NP   linaCLOtide (Linzess) 145 mcg cap capsule Take 1 Cap by mouth Daily (before breakfast). With WATER only, 30 minutes before breakfast. 9/3/20  Yes Watson, [address], NP   anastrozole (ARIMIDEX) 1 mg tablet 1 mg = 1 tab each dose, PO, daily, # 90 tab, 2 Refills, Pharmacy: [facility]/pharmacy #[id number]/4/20  Yes Provider, Historical   methIMAzole (TAPAZOLE) 5 mg tablet Take 1 Tab by mouth daily. 7/29/20  Yes Watson, [address], NP   lisinopriL (PRINIVIL, ZESTRIL) 20 mg tablet TAKE 1 TABLET BY MOUTH EVERY DAY 7/28/20  Yes Beryle Berke, NP   acetaminophen (Tylenol Arthritis Pain) 650 mg TbER Take 1 Tab by mouth three (3) times daily as needed for Pain. Indications: pain associated with arthritis 7/28/20  Yes Watson, [address], NP   multivitamin (ONE A DAY) tablet Take 1 Tab by mouth daily. Indications: Treatment To Prevent Vitamin Deficiency 6/17/19  Yes [address], NP   aspirin delayed-release 81 mg tablet Take 1 Tab by mouth daily. 12/8/15  Yes Larry Hernandez MD   [facility].   Patient not taking: Reported on 6/15/2021 1/18/19   Beryle Berke, NP     No Known Allergies    Patient Active Problem List   Diagnosis Code    Pain R52    Venous thrombosis I82.90  UTI (lower urinary tract infection) N39.0    Frequent urination R35.0    Cramps, extremity R25.2    Overactive bladder N32.81    Hyperlipidemia E78.5    Microscopic hematuria R31.29    Urinary frequency R35.0    HTN (hypertension), benign I10    Pure hypercholesterolemia E78.00    Subclinical hyperthyroidism E05.90    Bilateral chronic knee pain M25.561, M25.562, G89.29    Pain medication agreement signed Z02.89    Major neurocognitive disorder due to Alzheimer's disease, probable, without behavioral disturbance ([facility] [address] F01.50     Patient Active Problem List    Diagnosis Date Noted    Major neurocognitive disorder due to Alzheimer's disease, probable, without behavioral disturbance ([facility] [address] 08/12/2020    Pain medication agreement signed 07/17/2017    Bilateral chronic knee pain 09/16/2016    Subclinical hyperthyroidism 08/29/2015    HTN (hypertension), benign 08/28/2015    Pure hypercholesterolemia 08/28/2015    Urinary frequency 11/13/2012    Microscopic hematuria 05/11/2011    Hyperlipidemia 02/09/2011    Overactive bladder 01/17/2011    Cramps, extremity 12/20/2010    UTI (lower urinary tract infection) 08/25/2010    Frequent urination 08/25/2010    Venous thrombosis 02/17/2010    Pain 02/12/2010     Current Outpatient Medications   Medication Sig Dispense Refill    donepeziL (ARICEPT) 10 mg tablet Take 1 Tablet by mouth nightly. 90 Tablet 2    traZODone (DESYREL) 50 mg tablet Take 1 Tablet by mouth nightly. For sleep 90 Tablet 2    metoprolol succinate (TOPROL-XL) 50 mg XL tablet Take 1 Tablet by mouth daily. 90 Tablet 1    diclofenac (VOLTAREN) 1 % gel Apply 2 g to affected area every six (6) hours. 100 g 2    simvastatin (ZOCOR) 20 mg tablet Take 1 Tab by mouth nightly. 90 Tab 3    cholecalciferol (Vitamin D3) (1000 Units /25 mcg) tablet Take 2 Tabs by mouth daily. 180 Tab 3    amLODIPine (NORVASC) 5 mg tablet Take 1 Tab by mouth daily.  90 Tab 3    linaCLOtide (Linzess) 145 mcg cap capsule Take 1 Cap by mouth Daily (before breakfast). With WATER only, 30 minutes before breakfast. 30 Cap 11    anastrozole (ARIMIDEX) 1 mg tablet 1 mg = 1 tab each dose, PO, daily, # 90 tab, 2 Refills, Pharmacy: [facility]/pharmacy #[id number]     methIMAzole (TAPAZOLE) 5 mg tablet Take 1 Tab by mouth daily. 30 Tab 11    lisinopriL (PRINIVIL, ZESTRIL) 20 mg tablet TAKE 1 TABLET BY MOUTH EVERY DAY 90 Tab 3    acetaminophen (Tylenol Arthritis Pain) 650 mg TbER Take 1 Tab by mouth three (3) times daily as needed for Pain. Indications: pain associated with arthritis 100 Tab 3    multivitamin (ONE A DAY) tablet Take 1 Tab by mouth daily. Indications: Treatment To Prevent Vitamin Deficiency 90 Tab 3    aspirin delayed-release 81 mg tablet Take 1 Tab by mouth daily. 90 Tab 3    miscellaneous medical supply [facility] [place] bed. (Patient not taking: Reported on 6/15/2021) 1 Each 0     No Known Allergies  Past Medical History:   Diagnosis Date    Cancer ([address]     right breast.    Chronic pain     CVA     HTN (hypertension) 2/9/2011    Hypertension     Microscopic hematuria 5/11/2011    Other and unspecified hyperlipidemia 2/9/2011    Subclinical hyperthyroidism 8/29/2015    Venous thrombosis 2/17/2010    left arm DVT     Past Surgical History:   Procedure Laterality Date    HX HEENT      HX HEMORRHOIDECTOMY      AR BREAST SURGERY PROCEDURE UNLISTED       Family History   Problem Relation Age of Onset    Stroke Mother     Stroke Father     Diabetes Daughter      Social History     Tobacco Use    Smoking status: Former Smoker    Smokeless tobacco: Never Used    Tobacco comment: smoked for 10 yrs   Substance Use Topics    Alcohol use:  Yes     Alcohol/week: 0.8 standard drinks     Types: 1 Cans of beer per week     Comment: occ       ROS    I affirm this is a Patient-Initiated Episode with a Patient who has not had a related appointment within my department in the past 7 days or scheduled within the next 24 hours.     Total Time: minutes: 5-10 minutes    Note: not billable if this call serves to triage the patient into an appointment for the relevant concern      Néstor Lopez NP

## 2021-06-15 NOTE — PATIENT INSTRUCTIONS
Learning About Degenerative Disc Disease What is degenerative disc disease? Degenerative disc disease isn't really a disease. It's a term used to describe the normal changes in your spinal discs as you age. Spinal discs are small, spongy discs that separate the bones (vertebrae) that make up the spine. The discs act as shock absorbers for the spine. They let your spine flex, bend, and twist. 
Degenerative disc disease can take place in one or more places along the spine. It most often occurs in the discs in the lower back and the neck. The changes in the discs can cause back and neck pain. They can also lead to osteoarthritis, a herniated disc, or spinal stenosis. What causes it? As we age, our spinal discs break down, or degenerate. This breakdown causes the symptoms of degenerative disc disease in some people. When the discs break down, they can lose fluid and dry out, and their outer layers can have tiny cracks or tears. This leads to less padding and less space between the bones in the spine. The body reacts to this by making bony growths on the spine called bone spurs. These spurs can press on the spinal nerve roots or spinal cord. This can cause pain and can affect how well the nerves work. These changes in the discs are more likely to occur if you smoke, do heavy physical work (such as repeated heavy lifting), or are very overweight. A sudden injury may also cause changes to occur. What are the symptoms? Many people with degenerative disc disease have no pain. But others have severe pain or other symptoms that limit their activities. Some of the most common symptoms are: 
· Pain in the back or neck. Where the pain occurs depends on which discs are affected. · Pain that gets worse when you move, such as when you bend over, reach up, or twist. 
· Pain that may occur in the rear end (buttocks), arm, or leg if a nerve is pinched. · Numbness or tingling in your arm or leg.  
The pain may start after a major injury (such as from a car accident), a minor injury (such as a fall from a low height), or a normal motion (such as bending over to pick something up). It may also start gradually for no known reason and get worse over time. How is it diagnosed? A doctor can often diagnose degenerative disc disease while doing a physical exam. If your exam shows no signs of a serious condition, imaging tests (such as an X-ray) aren't likely to help your doctor find the cause of your symptoms. Sometimes degenerative disc disease is found when an X-ray is taken for another reason, such as an injury or other health problem. But even if the doctor finds degenerative disc disease, that doesn't always mean that you will have symptoms. How is degenerative disc disease treated? Self-care may be all you need to relieve pain caused by disc changes. This may include using ice or heat and taking over-the-counter medicines. Your doctor can prescribe stronger medicines if needed. If you develop health problems such as osteoarthritis, a herniated disc, or spinal stenosis, you may need other treatments. These include physical therapy and exercises for strengthening and stretching the back. In some cases, surgery may be recommended. It usually involves removing the damaged disc. In some cases, the bone is then permanently joined (fused) to protect the spinal cord. In rare cases, an artificial disc may be used to replace the disc that is removed. How can you care for yourself? Here are some things you can do to help manage pain from degenerative disc disease. · Use ice or heat (whichever feels better) on the affected area. ? Put ice or a cold pack on the area for 10 to 20 minutes at a time. Put a thin cloth between the ice and your skin. ? Put a warm water bottle, a heating pad set on low, or a warm cloth on your back. Put a thin cloth between the heating pad and your skin. Do not go to sleep with a heating pad on your skin. · Ask your doctor if you can take an over-the-counter pain medicine. These include acetaminophen (such as Tylenol) and nonsteroidal anti-inflammatory drugs, such as ibuprofen or naproxen. Your doctor can prescribe stronger medicines if needed. Be safe with medicines. Read and follow all instructions on the label. · Get some exercise every day. Exercise is one of the best ways to help your back feel better and stay better. It's best to start each exercise slowly. You may notice a little soreness, and that's okay. But if an exercise makes your pain worse, stop doing it. Here are things you can try: 
? Walking. It's the simplest and maybe the best activity for your back. It gets your blood moving and helps your muscles stay strong. ? Exercises that gently stretch and strengthen your stomach, back, and leg muscles. The stronger those muscles are, the better they're able to protect your back. Follow-up care is a key part of your treatment and safety. Be sure to make and go to all appointments, and call your doctor if you are having problems. It's also a good idea to know your test results and keep a list of the medicines you take. Where can you learn more? Go to http://www.gray.com/ Enter W611 in the search box to learn more about \"Learning About Degenerative Disc Disease. \" Current as of: November 16, 2020               Content Version: 12.8 © 0420-1159 [employer]. Care instructions adapted under license by [employer] (which disclaims liability or warranty for this information). If you have questions about a medical condition or this instruction, always ask your healthcare professional. [place] [address] any warranty or liability for your use of this information.

## 2021-06-15 NOTE — PROGRESS NOTES
Pt is here for   Chief Complaint   Patient presents with    Back Pain     x 3 days, used a lidocaine patch for the pain which seens to help some, Daughter Kwabena Sood is concerned because mom has a hx of cancer. Kay Martinez PHONE CALL [phone]     Shoulder Pain     x 3 days      1. Have you been to the ER, urgent care clinic since your last visit? Hospitalized since your last visit? No    2. Have you seen or consulted any other health care providers outside of the [address] [place] [address] since your last visit? Include any pap smears or colon screening.  No

## 2021-07-14 DIAGNOSIS — M47.812 FACET ARTHROPATHY, CERVICAL: ICD-10-CM

## 2021-07-14 DIAGNOSIS — G89.29 BILATERAL CHRONIC KNEE PAIN: ICD-10-CM

## 2021-07-14 DIAGNOSIS — M25.561 BILATERAL CHRONIC KNEE PAIN: ICD-10-CM

## 2021-07-14 DIAGNOSIS — M25.562 BILATERAL CHRONIC KNEE PAIN: ICD-10-CM

## 2021-07-14 DIAGNOSIS — I10 ESSENTIAL HYPERTENSION: ICD-10-CM

## 2021-07-14 RX ORDER — DEXTROMETHORPHAN HYDROBROMIDE, GUAIFENESIN 5; 100 MG/5ML; MG/5ML
650 LIQUID ORAL
Qty: 100 TABLET | Refills: 3 | OUTPATIENT
Start: 2021-07-14

## 2021-07-14 RX ORDER — LISINOPRIL 20 MG/1
TABLET ORAL
Qty: 90 TABLET | Refills: 1 | OUTPATIENT
Start: 2021-07-14

## 2021-07-14 NOTE — TELEPHONE ENCOUNTER
[facility] left a voice message requesting refills. Last visit 06/15/2021 Virtual visit NP Chastity Ulrich   Next appointment 07/27/2021 DOROTA Chaudhari   Previous refill encounter(s)   07/28/2020:  - Prinivil #90 with 3 refills,   - Tylenol Arthritis Pain #100 with 3 refills. Requested Prescriptions     Pending Prescriptions Disp Refills    lisinopriL (PRINIVIL, ZESTRIL) 20 mg tablet 90 Tablet 1     Sig: TAKE 1 TABLET BY MOUTH EVERY DAY    acetaminophen (Tylenol Arthritis Pain) 650 mg TbER 100 Tablet 3     Sig: Take 1 Tablet by mouth three (3) times daily as needed for Pain.  Indications: pain associated with arthritis

## 2021-09-09 NOTE — PATIENT INSTRUCTIONS
Allergies: Care Instructions  Your Care Instructions     Allergies occur when your body's defense system (immune system) overreacts to certain substances. The immune system treats a harmless substance as if it were a harmful germ or virus. Many things can make this happen. These include pollens, medicine, food, dust, animal dander, and mold. Allergies can be mild or severe. Mild allergies can be managed with home treatment. But medicine may be needed to prevent problems. Managing your allergies is an important part of staying healthy. Your doctor may suggest that you have allergy testing to help find out what is causing your allergies. Severe allergies can cause reactions that affect your whole body (anaphylactic reactions). Your doctor may prescribe a shot of epinephrine to carry with you in case you have a severe reaction. Learn how to give yourself the shot and keep it with you at all times. Make sure it is not . Follow-up care is a key part of your treatment and safety. Be sure to make and go to all appointments, and call your doctor if you are having problems. It's also a good idea to know your test results and keep a list of the medicines you take. How can you care for yourself at home? · If you have been told by your doctor that dust or dust mites are causing your allergy, decrease the dust around your bed:  ? Wash sheets, pillowcases, and other bedding in hot water every week. ? Use dust-proof covers for pillows, duvets, and mattresses. Avoid plastic covers because they tear easily and do not \"breathe. \" Wash as instructed on the label. ? Do not use any blankets and pillows that you do not need. ? Use blankets that you can wash in your washing machine. ? Consider removing drapes and carpets, which attract and hold dust, from your bedroom. · If you are allergic to house dust and mites, do not use home humidifiers. Your doctor can suggest ways you can control dust and mites.   · Look for signs of cockroaches. Cockroaches cause allergic reactions. Use cockroach baits to get rid of them. Then, clean your home well. Cockroaches like areas where grocery bags, newspapers, empty bottles, or cardboard boxes are stored. Do not keep these inside your home, and keep trash and food containers sealed. Seal off any spots where cockroaches might enter your home. · If you are allergic to mold, get rid of furniture, rugs, and drapes that smell musty. Check for mold in the bathroom. · If you are allergic to outdoor pollen or mold spores, use air-conditioning. Change or clean all filters every month. Keep windows closed. · If you are allergic to pollen, stay inside when pollen counts are high. Use a vacuum  with a HEPA filter or a double-thickness filter at least two times each week. · Stay inside when air pollution is bad. Avoid paint fumes, perfumes, and other strong odors. · Avoid conditions that make your allergies worse. Stay away from smoke. Do not smoke or let anyone else smoke in your house. Do not use fireplaces or wood-burning stoves. · If you are allergic to your pets, change the air filter in your furnace every month. Use high-efficiency filters. · If you are allergic to pet dander, keep pets outside or out of your bedroom. Old carpet and cloth furniture can hold a lot of animal dander. You may need to replace them. When should you call for help? Give an epinephrine shot if:    · You think you are having a severe allergic reaction.     · You have symptoms in more than one body area, such as mild nausea and an itchy mouth. After giving an epinephrine shot call 911, even if you feel better. Call 911 if:    · You have symptoms of a severe allergic reaction. These may include:  ? Sudden raised, red areas (hives) all over your body. ? Swelling of the throat, mouth, lips, or tongue. ? Trouble breathing. ? Passing out (losing consciousness).  Or you may feel very lightheaded or suddenly feel weak, confused, or restless.     · You have been given an epinephrine shot, even if you feel better. Call your doctor now or seek immediate medical care if:    · You have symptoms of an allergic reaction, such as:  ? A rash or hives (raised, red areas on the skin). ? Itching. ? Swelling. ? Belly pain, nausea, or vomiting. Watch closely for changes in your health, and be sure to contact your doctor if:    · You do not get better as expected. Where can you learn more? Go to http://www.stewart.com/  Enter W171 in the search box to learn more about \"Allergies: Care Instructions. \"  Current as of: November 6, 2020               Content Version: 12.8  © 2006-2021 Healthwise[employer] Inc[employer]. Care instructions adapted under license by [employer] (which disclaims liability or warranty for this information). If you have questions about a medical condition or this instruction, always ask your healthcare professional. [place] [address] any warranty or liability for your use of this information.

## 2021-09-09 NOTE — PROGRESS NOTES
Pt is here for   Chief Complaint   Patient presents with    Follow-up     HTN    Annual Wellness Visit     medicare     New Order     for hospital bed     1. Have you been to the ER, urgent care clinic since your last visit? Hospitalized since your last visit? No    2. Have you seen or consulted any other health care providers outside of the [address] [place] [address] since your last visit? Include any pap smears or colon screening.  No    Denies pain at this time

## 2021-09-09 NOTE — PROGRESS NOTES
Yuliet Michelle (: 1930) is a 80 y.o. female, established patient, here for evaluation of the following chief complaint(s):  Follow-up (HTN), Annual Wellness Visit (medicare ), and New Order (for hospital bed)       ASSESSMENT/PLAN:  Below is the assessment and plan developed based on review of pertinent history, physical exam, labs, studies, and medications. 1. Persistent dry cough  -     loratadine (Claritin) 10 mg tablet; Take 1 Tablet by mouth daily. , Normal, Disp-90 Tablet, R-1  2. Subclinical hyperthyroidism  -     TSH 3RD GENERATION; Future  -     T4 (THYROXINE); Future  3. Essential hypertension  -     METABOLIC PANEL, COMPREHENSIVE; Future  -     CBC WITH AUTOMATED DIFF; Future  4. Mixed hyperlipidemia  -     LIPID PANEL; Future  5. MCI (mild cognitive impairment)  6. Screening for endocrine, nutritional, metabolic and immunity disorder  -     METABOLIC PANEL, COMPREHENSIVE; Future  -     CBC WITH AUTOMATED DIFF; Future  -     HEMOGLOBIN A1C WITH EAG; Future  -     TSH 3RD GENERATION; Future  -     T4 (THYROXINE); Future      Return in about 6 months (around 3/9/2022) for OV- HTN, Lab review, repeat TSH? Jennifer Prabhakar Pt asked to complete follow by next visit: use of OTC antihistamine for cough, may use OTC cough syrup also. Labs ordered otherwise. SUBJECTIVE/OBJECTIVE:  HPI    Pt here for subsequent MWV and to f/u HTN, CHOL, and dementia. Daughter feels dementia progressing and pt reports doing things she is NOT doing. Like picking out meds she will NOT take, but reports taking when seen by providers. Denies side effects from medication. Pt reports feeling good and daughter Michelle Sahni has not concerns today except cough. Having cough for past 1 month. Dry mostly. No SOB or CP. No treatments tried, but brother bought robitussin recently to help too, hasn't started yet.      Hypertension Review:  The patient has hypertension  Diet and Lifestyle: generally does try to follow a  low sodium diet, exercises sporadically   Home BP Monitoring: is not measured at home. Pertinent ROS: taking medications as instructed, no medication side effects noted. No TIA's, chest pain on exertion, dyspnea on exertion, or swelling of ankles. BP Readings from Last 3 Encounters:   09/09/21 137/63   06/14/21 138/72   02/24/21 138/64       Dyslipidemia Review:  Patient presents for evaluation of lipids. Compliance with treatment thus far has been good. Denies myalgias, slurring speech, unilateral weakness, and chest pain. A repeat fasting lipid profile was done/ordered. The patient does use medications that may worsen dyslipidemias (corticosteroids, progestins, anabolic steroids, diuretics, beta-blockers, amiodarone, cyclosporine, olanzapine). The patient does NOT exercise regularly, pt reports walking around the house for ~ 10 minutes, but daughter shaking head no during pt report. The patient is known to have coexisting coronary artery disease: TIA. Taking Aspirin daily as prescribed/advised.     Review of Systems  Constitutional: negative for fevers, chills, anorexia and weight loss  Respiratory:  negative for cough, hemoptysis, dyspnea, and wheezing  CV:   negative for chest pain, palpitations, and lower extremity edema  GI:   negative for nausea, vomiting, diarrhea, abdominal pain, and melena  Endo:               negative for polyuria,polydipsia,polyphagia, and heat intolerance  Genitourinary: negative for frequency, urgency, dysuria, retention, and hematuria  Integument:  negative for rash, ulcerations  Hematologic:  negative for easy bruising and bleeding  Musculoskel: negative for muscle weakness  Neurological:  negative for headaches, dizziness, vertigo,and memory problems  Behavl/Psych: negative for feelings of anxiety, depression, suicide, and mood changes    Visit Vitals  /63 (BP 1 Location: Left upper arm, BP Patient Position: Sitting, BP Cuff Size: Large adult)   Pulse 66   Temp 98.3 °F (36.8 °C) (Temporal)   Resp 17 Ht 5' 1\" (1.549 m)   BMI 22.30 kg/m²       Wt Readings from Last 3 Encounters:   06/14/21 118 lb (53.5 kg)   02/24/21 124 lb (56.2 kg)   08/17/20 117 lb (53.1 kg)         Physical Exam:   General appearance - alert, well appearing, and in no distress. Mental status -normal mood and affect. Chest - CTA. Symmetric chest rise. No wheezing. No distress. Heart - Normal rate & rhythm. Normal S1 & S2. No MGR. Abdomen- Soft, round. Non-distended, NT. No pulsatile masses or hernias. Ext-  No pedal edema, clubbing, or cyanosis. Skin-Warm and dry. No hyperpigmentation, ulcerations, or suspicious lesions. Neuro - Normal speech, no focal findings or movement disorder. Normal strength and muscle tone. Gait not observed since in W/C. Assessment of cognitive impairment: Alert and oriented x 2, person and place. Depression Screen:   3 most recent PHQ Screens 9/9/2021   Little interest or pleasure in doing things Not at all   Feeling down, depressed, irritable, or hopeless Not at all   Total Score PHQ 2 0       Fall Risk Assessment:    Fall Risk Assessment, last 12 mths 9/9/2021   Able to walk? No   Fall in past 12 months? -   Do you feel unsteady? -   Are you worried about falling -   Number of falls in past 12 months -   Fall with injury? -       Abuse Assessment: Patient not domesticly abuse (verbal, physical, and financial)    Current Alcohol use: no   reports current alcohol use of about 0.8 standard drinks of alcohol per week. Functional Ability:   Does the patient exhibit a steady gait? No presents in W/C   How long did it take the patient to get up and walk from a sitting position? Not seen today   Is the patient self reliant?  (ie can do own laundry, meals, household chores) no done by family, but pt reports helping     Does the patient handle his/her own medications? Takes meds, but children sets up     Does the patient handle his/her own money?    Yes     Is the patients home safe (ie good lighting, handrails on stairs and bath, etc.)? Yes   Did you notice or did patient express any hearing difficulties? no   Did you notice of did patient express any vision difficulties?  no   Were distance and reading eye charts used? n/a     Advance Care Planning:   Patient was offered the opportunity to discuss advance care planning:  Yes   Does patient have an Advance Directive: no has DNR   If no, did you provide information and forms? No, has DNR on file     Health Maintenance   Topic Date Due    Flu Vaccine (1) 09/01/2021    Shingrix Vaccine Age 50> (1 of 2) 12/15/2021 (Originally 7/1/1980)    Pneumococcal 65+ years (1 of 1 - PPSV23) 09/09/2022 (Originally 7/1/1995)    Lipid Screen  09/09/2022    Medicare Yearly Exam  09/10/2022    DTaP/Tdap/Td series (2 - Td or Tdap) 09/16/2026    Bone Densitometry (Dexa) Screening  Completed    COVID-19 Vaccine  Completed                 An electronic signature was used to authenticate this note.   -- Susanna Avendaño NP

## 2021-11-15 NOTE — TELEPHONE ENCOUNTER
[facility] left a [facility] message requesting new prescriptions on behalf of the pt. Previous prescriptions was sent to a local [facility]. Last visit 09/09/2021 NP Allyson Prakash   Next appointment 03/10/2022 DOROTA Chaudhari   Previous refill encounter(s)   12/15/2020 Norvasc #90 with 3 refills,  04/21/2021 Voltaren Gel #100 grams with 2 refills. Requested Prescriptions     Pending Prescriptions Disp Refills    amLODIPine (NORVASC) 5 mg tablet 90 Tablet 3     Sig: Take 1 Tablet by mouth daily.  diclofenac (VOLTAREN) 1 % gel 100 g 2     Sig: Apply 2 g to affected area every six (6) hours.

## 2022-01-01 ENCOUNTER — TELEPHONE (OUTPATIENT)
Dept: FAMILY MEDICINE CLINIC | Age: 87
End: 2022-01-01

## 2022-01-01 ENCOUNTER — HOSPITAL ENCOUNTER (INPATIENT)
Age: 87
LOS: 2 days | End: 2022-09-01
Attending: FAMILY MEDICINE | Admitting: FAMILY MEDICINE

## 2022-01-01 ENCOUNTER — HOME CARE VISIT (OUTPATIENT)
Dept: HOSPICE | Facility: HOSPICE | Age: 87
End: 2022-01-01
Payer: MEDICARE

## 2022-01-01 ENCOUNTER — OFFICE VISIT (OUTPATIENT)
Dept: NEUROLOGY | Age: 87
End: 2022-01-01
Payer: MEDICARE

## 2022-01-01 ENCOUNTER — OFFICE VISIT (OUTPATIENT)
Dept: FAMILY MEDICINE CLINIC | Age: 87
End: 2022-01-01

## 2022-01-01 ENCOUNTER — TELEPHONE (OUTPATIENT)
Dept: NEUROLOGY | Age: 87
End: 2022-01-01

## 2022-01-01 ENCOUNTER — HOME CARE VISIT (OUTPATIENT)
Dept: HOME HEALTH SERVICES | Facility: HOME HEALTH | Age: 87
End: 2022-01-01
Payer: MEDICARE

## 2022-01-01 ENCOUNTER — HOSPITAL ENCOUNTER (EMERGENCY)
Age: 87
Discharge: HOME | End: 2022-06-22
Attending: EMERGENCY MEDICINE
Payer: MEDICARE

## 2022-01-01 ENCOUNTER — DOCUMENTATION ONLY (OUTPATIENT)
Dept: FAMILY MEDICINE CLINIC | Age: 87
End: 2022-01-01

## 2022-01-01 ENCOUNTER — APPOINTMENT (OUTPATIENT)
Dept: GENERAL RADIOLOGY | Age: 87
End: 2022-01-01
Attending: PHYSICIAN ASSISTANT
Payer: MEDICARE

## 2022-01-01 ENCOUNTER — TRANSCRIBE ORDER (OUTPATIENT)
Dept: SCHEDULING | Age: 87
End: 2022-01-01

## 2022-01-01 ENCOUNTER — HOME VISIT (OUTPATIENT)
Dept: FAMILY MEDICINE CLINIC | Age: 87
End: 2022-01-01
Payer: MEDICARE

## 2022-01-01 ENCOUNTER — HOME CARE VISIT (OUTPATIENT)
Dept: SCHEDULING | Facility: HOME HEALTH | Age: 87
End: 2022-01-01
Payer: MEDICARE

## 2022-01-01 ENCOUNTER — TELEPHONE (OUTPATIENT)
Dept: PALLATIVE CARE | Age: 87
End: 2022-01-01

## 2022-01-01 ENCOUNTER — HOSPITAL ENCOUNTER (EMERGENCY)
Age: 87
Discharge: HOME | End: 2022-06-28
Attending: EMERGENCY MEDICINE
Payer: MEDICARE

## 2022-01-01 ENCOUNTER — APPOINTMENT (OUTPATIENT)
Dept: CT IMAGING | Age: 87
End: 2022-01-01
Attending: PHYSICIAN ASSISTANT
Payer: MEDICARE

## 2022-01-01 ENCOUNTER — HOME HEALTH ADMISSION (OUTPATIENT)
Dept: HOME HEALTH SERVICES | Facility: HOME HEALTH | Age: 87
End: 2022-01-01
Payer: MEDICARE

## 2022-01-01 ENCOUNTER — DOCUMENTATION ONLY (OUTPATIENT)
Dept: NEUROLOGY | Age: 87
End: 2022-01-01

## 2022-01-01 ENCOUNTER — TELEPHONE (OUTPATIENT)
Dept: INTERNAL MEDICINE CLINIC | Age: 87
End: 2022-01-01

## 2022-01-01 ENCOUNTER — HOME VISIT (OUTPATIENT)
Dept: FAMILY MEDICINE CLINIC | Age: 87
End: 2022-01-01

## 2022-01-01 ENCOUNTER — HOSPICE ADMISSION (OUTPATIENT)
Dept: HOSPICE | Facility: HOSPICE | Age: 87
End: 2022-01-01
Payer: MEDICARE

## 2022-01-01 ENCOUNTER — HOSPITAL ENCOUNTER (EMERGENCY)
Age: 87
Discharge: STILL ADMITTED OR ADMINISTRATIVE | End: 2022-06-22

## 2022-01-01 VITALS
SYSTOLIC BLOOD PRESSURE: 130 MMHG | TEMPERATURE: 96.4 F | OXYGEN SATURATION: 100 % | RESPIRATION RATE: 18 BRPM | DIASTOLIC BLOOD PRESSURE: 62 MMHG | HEART RATE: 59 BPM

## 2022-01-01 VITALS
TEMPERATURE: 98 F | OXYGEN SATURATION: 100 % | HEIGHT: 60 IN | SYSTOLIC BLOOD PRESSURE: 184 MMHG | DIASTOLIC BLOOD PRESSURE: 72 MMHG | HEART RATE: 60 BPM | RESPIRATION RATE: 18 BRPM | BODY MASS INDEX: 22.58 KG/M2 | WEIGHT: 115 LBS

## 2022-01-01 VITALS
SYSTOLIC BLOOD PRESSURE: 140 MMHG | OXYGEN SATURATION: 98 % | RESPIRATION RATE: 18 BRPM | DIASTOLIC BLOOD PRESSURE: 70 MMHG | TEMPERATURE: 98.2 F | HEART RATE: 62 BPM

## 2022-01-01 VITALS
OXYGEN SATURATION: 98 % | DIASTOLIC BLOOD PRESSURE: 64 MMHG | SYSTOLIC BLOOD PRESSURE: 132 MMHG | HEART RATE: 78 BPM | TEMPERATURE: 97.7 F

## 2022-01-01 VITALS
TEMPERATURE: 98.5 F | OXYGEN SATURATION: 97 % | HEART RATE: 76 BPM | RESPIRATION RATE: 16 BRPM | DIASTOLIC BLOOD PRESSURE: 73 MMHG | SYSTOLIC BLOOD PRESSURE: 155 MMHG

## 2022-01-01 VITALS
BODY MASS INDEX: 21.71 KG/M2 | SYSTOLIC BLOOD PRESSURE: 148 MMHG | HEART RATE: 68 BPM | HEIGHT: 61 IN | WEIGHT: 115 LBS | DIASTOLIC BLOOD PRESSURE: 88 MMHG | OXYGEN SATURATION: 97 %

## 2022-01-01 VITALS
SYSTOLIC BLOOD PRESSURE: 130 MMHG | TEMPERATURE: 98.3 F | DIASTOLIC BLOOD PRESSURE: 80 MMHG | OXYGEN SATURATION: 100 % | HEART RATE: 60 BPM

## 2022-01-01 VITALS
DIASTOLIC BLOOD PRESSURE: 60 MMHG | HEART RATE: 54 BPM | SYSTOLIC BLOOD PRESSURE: 110 MMHG | OXYGEN SATURATION: 100 % | RESPIRATION RATE: 18 BRPM

## 2022-01-01 VITALS
OXYGEN SATURATION: 97 % | DIASTOLIC BLOOD PRESSURE: 60 MMHG | SYSTOLIC BLOOD PRESSURE: 124 MMHG | TEMPERATURE: 98.1 F | HEART RATE: 61 BPM

## 2022-01-01 VITALS
RESPIRATION RATE: 16 BRPM | TEMPERATURE: 96.1 F | DIASTOLIC BLOOD PRESSURE: 80 MMHG | SYSTOLIC BLOOD PRESSURE: 128 MMHG | HEART RATE: 77 BPM

## 2022-01-01 VITALS
OXYGEN SATURATION: 100 % | DIASTOLIC BLOOD PRESSURE: 90 MMHG | TEMPERATURE: 99 F | SYSTOLIC BLOOD PRESSURE: 142 MMHG | HEART RATE: 64 BPM | RESPIRATION RATE: 18 BRPM

## 2022-01-01 VITALS
TEMPERATURE: 98.2 F | BODY MASS INDEX: 19.93 KG/M2 | DIASTOLIC BLOOD PRESSURE: 75 MMHG | SYSTOLIC BLOOD PRESSURE: 101 MMHG | OXYGEN SATURATION: 100 % | RESPIRATION RATE: 18 BRPM | HEIGHT: 63 IN | HEART RATE: 85 BPM | WEIGHT: 112.5 LBS

## 2022-01-01 VITALS
OXYGEN SATURATION: 99 % | DIASTOLIC BLOOD PRESSURE: 70 MMHG | SYSTOLIC BLOOD PRESSURE: 140 MMHG | RESPIRATION RATE: 18 BRPM | TEMPERATURE: 98.2 F | HEART RATE: 66 BPM

## 2022-01-01 VITALS
RESPIRATION RATE: 14 BRPM | HEART RATE: 81 BPM | DIASTOLIC BLOOD PRESSURE: 58 MMHG | OXYGEN SATURATION: 92 % | SYSTOLIC BLOOD PRESSURE: 122 MMHG

## 2022-01-01 VITALS
OXYGEN SATURATION: 98 % | RESPIRATION RATE: 18 BRPM | SYSTOLIC BLOOD PRESSURE: 130 MMHG | DIASTOLIC BLOOD PRESSURE: 70 MMHG | TEMPERATURE: 98.2 F | HEART RATE: 64 BPM

## 2022-01-01 VITALS
TEMPERATURE: 98.4 F | OXYGEN SATURATION: 97 % | DIASTOLIC BLOOD PRESSURE: 61 MMHG | RESPIRATION RATE: 16 BRPM | HEART RATE: 77 BPM | SYSTOLIC BLOOD PRESSURE: 108 MMHG

## 2022-01-01 VITALS
OXYGEN SATURATION: 30 % | TEMPERATURE: 99.6 F | DIASTOLIC BLOOD PRESSURE: 42 MMHG | HEART RATE: 36 BPM | SYSTOLIC BLOOD PRESSURE: 68 MMHG | RESPIRATION RATE: 20 BRPM

## 2022-01-01 VITALS
HEART RATE: 112 BPM | OXYGEN SATURATION: 95 % | RESPIRATION RATE: 16 BRPM | DIASTOLIC BLOOD PRESSURE: 50 MMHG | SYSTOLIC BLOOD PRESSURE: 103 MMHG

## 2022-01-01 VITALS
RESPIRATION RATE: 20 BRPM | DIASTOLIC BLOOD PRESSURE: 86 MMHG | SYSTOLIC BLOOD PRESSURE: 177 MMHG | HEART RATE: 65 BPM | OXYGEN SATURATION: 96 % | TEMPERATURE: 96.9 F

## 2022-01-01 VITALS
SYSTOLIC BLOOD PRESSURE: 140 MMHG | HEART RATE: 65 BPM | OXYGEN SATURATION: 100 % | DIASTOLIC BLOOD PRESSURE: 70 MMHG | TEMPERATURE: 98.3 F

## 2022-01-01 VITALS
HEART RATE: 52 BPM | SYSTOLIC BLOOD PRESSURE: 132 MMHG | OXYGEN SATURATION: 100 % | RESPIRATION RATE: 18 BRPM | DIASTOLIC BLOOD PRESSURE: 88 MMHG | TEMPERATURE: 99.9 F

## 2022-01-01 VITALS — RESPIRATION RATE: 18 BRPM

## 2022-01-01 VITALS
SYSTOLIC BLOOD PRESSURE: 132 MMHG | TEMPERATURE: 95.7 F | RESPIRATION RATE: 20 BRPM | HEART RATE: 82 BPM | DIASTOLIC BLOOD PRESSURE: 68 MMHG

## 2022-01-01 VITALS
OXYGEN SATURATION: 97 % | SYSTOLIC BLOOD PRESSURE: 126 MMHG | HEART RATE: 55 BPM | TEMPERATURE: 98.3 F | DIASTOLIC BLOOD PRESSURE: 50 MMHG

## 2022-01-01 DIAGNOSIS — F02.818 ALZHEIMER'S DEMENTIA OF OTHER ONSET WITH BEHAVIORAL DISTURBANCE: ICD-10-CM

## 2022-01-01 DIAGNOSIS — E05.90 SUBCLINICAL HYPERTHYROIDISM: ICD-10-CM

## 2022-01-01 DIAGNOSIS — E53.8 FOLATE DEFICIENCY: ICD-10-CM

## 2022-01-01 DIAGNOSIS — R52 NONVERBAL SIGNS OF PAIN: ICD-10-CM
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
## 2022-01-01 DIAGNOSIS — M25.562 BILATERAL CHRONIC KNEE PAIN: ICD-10-CM

## 2022-01-01 DIAGNOSIS — M25.561 BILATERAL CHRONIC KNEE PAIN: ICD-10-CM

## 2022-01-01 DIAGNOSIS — I10 ESSENTIAL HYPERTENSION: ICD-10-CM

## 2022-01-01 DIAGNOSIS — Z86.73 HISTORY OF CVA (CEREBROVASCULAR ACCIDENT): ICD-10-CM

## 2022-01-01 DIAGNOSIS — M47.812 FACET ARTHROPATHY, CERVICAL: ICD-10-CM

## 2022-01-01 DIAGNOSIS — R73.03 PREDIABETES: ICD-10-CM

## 2022-01-01 DIAGNOSIS — F03.90 MAJOR NEUROCOGNITIVE DISORDER DUE TO PROBABLE ALZHEIMER'S DISEASE, WITHOUT BEHAVIORAL DISTURBANCE (HCC): Primary | ICD-10-CM

## 2022-01-01 DIAGNOSIS — R45.1 RESTLESSNESS: ICD-10-CM

## 2022-01-01 DIAGNOSIS — D64.9 ANEMIA, UNSPECIFIED TYPE: ICD-10-CM

## 2022-01-01 DIAGNOSIS — E78.2 MIXED HYPERLIPIDEMIA: ICD-10-CM

## 2022-01-01 DIAGNOSIS — Z85.3 HISTORY OF BREAST CANCER: ICD-10-CM

## 2022-01-01 DIAGNOSIS — G30.8 ALZHEIMER'S DEMENTIA OF OTHER ONSET WITH BEHAVIORAL DISTURBANCE: ICD-10-CM

## 2022-01-01 DIAGNOSIS — Z13.1 SCREENING FOR DIABETES MELLITUS: ICD-10-CM

## 2022-01-01 DIAGNOSIS — F02.81 ALZHEIMER'S DEMENTIA WITH BEHAVIORAL DISTURBANCE, UNSPECIFIED TIMING OF DEMENTIA ONSET: Primary | ICD-10-CM

## 2022-01-01 DIAGNOSIS — F02.818 ALZHEIMER'S DEMENTIA OF OTHER ONSET WITH BEHAVIORAL DISTURBANCE: Primary | ICD-10-CM

## 2022-01-01 DIAGNOSIS — R05.3 PERSISTENT DRY COUGH: ICD-10-CM

## 2022-01-01 DIAGNOSIS — G89.29 BILATERAL CHRONIC KNEE PAIN: ICD-10-CM

## 2022-01-01 DIAGNOSIS — K59.00 CONSTIPATION, UNSPECIFIED CONSTIPATION TYPE: ICD-10-CM

## 2022-01-01 DIAGNOSIS — R35.0 URINARY FREQUENCY: ICD-10-CM

## 2022-01-01 DIAGNOSIS — G30.9 ALZHEIMER DISEASE (HCC): ICD-10-CM

## 2022-01-01 DIAGNOSIS — Z66 DO NOT RESUSCITATE: ICD-10-CM

## 2022-01-01 DIAGNOSIS — F03.90 MAJOR NEUROCOGNITIVE DISORDER DUE TO PROBABLE ALZHEIMER'S DISEASE, WITHOUT BEHAVIORAL DISTURBANCE (HCC): ICD-10-CM

## 2022-01-01 DIAGNOSIS — M54.2 NECK PAIN: Primary | ICD-10-CM

## 2022-01-01 DIAGNOSIS — N39.0 URINARY TRACT INFECTION WITHOUT HEMATURIA, SITE UNSPECIFIED: ICD-10-CM

## 2022-01-01 DIAGNOSIS — R31.29 MICROSCOPIC HEMATURIA: ICD-10-CM

## 2022-01-01 DIAGNOSIS — Z71.89 ADVANCE CARE PLANNING: ICD-10-CM

## 2022-01-01 DIAGNOSIS — Z71.89 GOALS OF CARE, COUNSELING/DISCUSSION: ICD-10-CM

## 2022-01-01 DIAGNOSIS — Z92.29 COVID-19 VACCINE SERIES COMPLETED: ICD-10-CM

## 2022-01-01 DIAGNOSIS — Z13.1 DIABETES MELLITUS SCREENING: Primary | ICD-10-CM

## 2022-01-01 DIAGNOSIS — G47.9 DIFFICULTY SLEEPING: ICD-10-CM

## 2022-01-01 DIAGNOSIS — Z91.81 AT HIGH RISK FOR FALLS: ICD-10-CM

## 2022-01-01 DIAGNOSIS — R41.82 ALTERED MENTAL STATUS, UNSPECIFIED ALTERED MENTAL STATUS TYPE: ICD-10-CM

## 2022-01-01 DIAGNOSIS — F02.80 ALZHEIMER DISEASE (HCC): ICD-10-CM
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
## 2022-01-01 DIAGNOSIS — Z76.89 ESTABLISHING CARE WITH NEW DOCTOR, ENCOUNTER FOR: ICD-10-CM

## 2022-01-01 DIAGNOSIS — R06.02 SOB (SHORTNESS OF BREATH): ICD-10-CM

## 2022-01-01 DIAGNOSIS — I10 HTN (HYPERTENSION), BENIGN: ICD-10-CM

## 2022-01-01 DIAGNOSIS — Z51.5 HOSPICE CARE: ICD-10-CM

## 2022-01-01 DIAGNOSIS — E53.8 VITAMIN B12 DEFICIENCY: ICD-10-CM

## 2022-01-01 DIAGNOSIS — G30.9 ALZHEIMER'S DEMENTIA WITH BEHAVIORAL DISTURBANCE, UNSPECIFIED TIMING OF DEMENTIA ONSET: Primary | ICD-10-CM

## 2022-01-01 DIAGNOSIS — G30.8 ALZHEIMER'S DEMENTIA OF OTHER ONSET WITH BEHAVIORAL DISTURBANCE: Primary | ICD-10-CM

## 2022-01-01 DIAGNOSIS — R13.10 PROBLEMS WITH SWALLOWING AND MASTICATION: Primary | ICD-10-CM

## 2022-01-01 DIAGNOSIS — B37.0 ORAL CANDIDIASIS: ICD-10-CM

## 2022-01-01 DIAGNOSIS — M54.2 NECK PAIN: ICD-10-CM

## 2022-01-01 DIAGNOSIS — R68.89 DIFFICULTY TRANSFERRING: ICD-10-CM

## 2022-01-01 DIAGNOSIS — R41.82 ALTERED MENTAL STATUS, UNSPECIFIED ALTERED MENTAL STATUS TYPE: Primary | ICD-10-CM

## 2022-01-01 DIAGNOSIS — D64.9 ANEMIA, UNSPECIFIED TYPE: Primary | ICD-10-CM

## 2022-01-01 DIAGNOSIS — Z76.89 ENCOUNTER FOR SOCIAL WORK INTERVENTION: Primary | ICD-10-CM

## 2022-01-01 DIAGNOSIS — Z13.0 SCREENING FOR DEFICIENCY ANEMIA: ICD-10-CM

## 2022-01-01 DIAGNOSIS — F03.90 DEMENTIA WITHOUT BEHAVIORAL DISTURBANCE, UNSPECIFIED DEMENTIA TYPE: ICD-10-CM

## 2022-01-01 DIAGNOSIS — R63.4 ABNORMAL WEIGHT LOSS: ICD-10-CM

## 2022-01-01 DIAGNOSIS — Z13.9 ENCOUNTER FOR HEALTH-RELATED SCREENING: ICD-10-CM

## 2022-01-01 DIAGNOSIS — E87.6 HYPOKALEMIA: ICD-10-CM

## 2022-01-01 LAB
ALBUMIN SERPL-MCNC: 2.6 G/DL (ref 3.5–5)
ALBUMIN SERPL-MCNC: 3.2 G/DL (ref 3.5–5)
ALBUMIN/GLOB SERPL: 0.7 {RATIO} (ref 1.1–2.2)
ALBUMIN/GLOB SERPL: 0.7 {RATIO} (ref 1.1–2.2)
ALP SERPL-CCNC: 79 U/L (ref 45–117)
ALP SERPL-CCNC: 81 U/L (ref 45–117)
ALT SERPL-CCNC: 11 U/L (ref 12–78)
ALT SERPL-CCNC: 15 U/L (ref 12–78)
ANION GAP SERPL CALC-SCNC: 4 MMOL/L (ref 5–15)
ANION GAP SERPL CALC-SCNC: 5 MMOL/L (ref 5–15)
ANION GAP SERPL CALC-SCNC: 8 MMOL/L (ref 5–15)
ANION GAP SERPL CALC-SCNC: 9 MMOL/L (ref 5–15)
APPEARANCE UR: ABNORMAL
APPEARANCE UR: ABNORMAL
APPEARANCE UR: CLEAR
AST SERPL-CCNC: 14 U/L (ref 15–37)
AST SERPL-CCNC: 18 U/L (ref 15–37)
BACTERIA SPEC CULT: ABNORMAL
BACTERIA SPEC CULT: ABNORMAL
BACTERIA URNS QL MICRO: ABNORMAL /HPF
BACTERIA URNS QL MICRO: ABNORMAL /HPF
BACTERIA URNS QL MICRO: NEGATIVE /HPF
BASOPHILS # BLD: 0.1 K/UL (ref 0–0.1)
BASOPHILS # BLD: 0.1 K/UL (ref 0–0.1)
BASOPHILS NFR BLD: 1 % (ref 0–1)
BASOPHILS NFR BLD: 1 % (ref 0–1)
BILIRUB SERPL-MCNC: 0.4 MG/DL (ref 0.2–1)
BILIRUB SERPL-MCNC: 0.8 MG/DL (ref 0.2–1)
BILIRUB UR QL: NEGATIVE
BUN SERPL-MCNC: 13 MG/DL (ref 6–20)
BUN SERPL-MCNC: 9 MG/DL (ref 6–20)
BUN/CREAT SERPL: 12 (ref 12–20)
BUN/CREAT SERPL: 16 (ref 12–20)
BUN/CREAT SERPL: 18 (ref 12–20)
BUN/CREAT SERPL: 19 (ref 12–20)
CALCIUM SERPL-MCNC: 8.8 MG/DL (ref 8.5–10.1)
CALCIUM SERPL-MCNC: 9 MG/DL (ref 8.5–10.1)
CALCIUM SERPL-MCNC: 9 MG/DL (ref 8.5–10.1)
CALCIUM SERPL-MCNC: 9.2 MG/DL (ref 8.5–10.1)
CC UR VC: ABNORMAL
CC UR VC: ABNORMAL
CHLORIDE SERPL-SCNC: 101 MMOL/L (ref 97–108)
CHLORIDE SERPL-SCNC: 102 MMOL/L (ref 97–108)
CHLORIDE SERPL-SCNC: 104 MMOL/L (ref 97–108)
CHLORIDE SERPL-SCNC: 106 MMOL/L (ref 97–108)
CO2 SERPL-SCNC: 25 MMOL/L (ref 21–32)
CO2 SERPL-SCNC: 28 MMOL/L (ref 21–32)
CO2 SERPL-SCNC: 31 MMOL/L (ref 21–32)
CO2 SERPL-SCNC: 31 MMOL/L (ref 21–32)
COLOR UR: ABNORMAL
CREAT SERPL-MCNC: 0.68 MG/DL (ref 0.55–1.02)
CREAT SERPL-MCNC: 0.71 MG/DL (ref 0.55–1.02)
CREAT SERPL-MCNC: 0.73 MG/DL (ref 0.55–1.02)
CREAT SERPL-MCNC: 0.81 MG/DL (ref 0.55–1.02)
DIFFERENTIAL METHOD BLD: ABNORMAL
DIFFERENTIAL METHOD BLD: ABNORMAL
EOSINOPHIL # BLD: 0 K/UL (ref 0–0.4)
EOSINOPHIL # BLD: 0 K/UL (ref 0–0.4)
EOSINOPHIL NFR BLD: 0 % (ref 0–7)
EOSINOPHIL NFR BLD: 0 % (ref 0–7)
EPITH CASTS URNS QL MICRO: ABNORMAL /LPF
ERYTHROCYTE [DISTWIDTH] IN BLOOD BY AUTOMATED COUNT: 14.9 % (ref 11.5–14.5)
ERYTHROCYTE [DISTWIDTH] IN BLOOD BY AUTOMATED COUNT: 15.1 % (ref 11.5–14.5)
ERYTHROCYTE [DISTWIDTH] IN BLOOD BY AUTOMATED COUNT: 15.5 % (ref 11.5–14.5)
ERYTHROCYTE [DISTWIDTH] IN BLOOD BY AUTOMATED COUNT: 15.8 % (ref 11.5–14.5)
ERYTHROCYTE [DISTWIDTH] IN BLOOD BY AUTOMATED COUNT: 16.3 % (ref 11.5–14.5)
EST. AVERAGE GLUCOSE BLD GHB EST-MCNC: 105 MG/DL
FERRITIN SERPL-MCNC: 153 NG/ML (ref 26–388)
FOLATE SERPL-MCNC: 8.9 NG/ML (ref 5–21)
GLOBULIN SER CALC-MCNC: 3.9 G/DL (ref 2–4)
GLOBULIN SER CALC-MCNC: 4.9 G/DL (ref 2–4)
GLUCOSE SERPL-MCNC: 102 MG/DL (ref 65–100)
GLUCOSE SERPL-MCNC: 149 MG/DL (ref 65–100)
GLUCOSE SERPL-MCNC: 153 MG/DL (ref 65–100)
GLUCOSE SERPL-MCNC: 83 MG/DL (ref 65–100)
GLUCOSE UR STRIP.AUTO-MCNC: NEGATIVE MG/DL
HBA1C MFR BLD: 5.3 % (ref 4–5.6)
HCT VFR BLD AUTO: 26.9 % (ref 35–47)
HCT VFR BLD AUTO: 26.9 % (ref 35–47)
HCT VFR BLD AUTO: 27.4 % (ref 35–47)
HCT VFR BLD AUTO: 28.2 % (ref 35–47)
HCT VFR BLD AUTO: 30 % (ref 35–47)
HGB BLD-MCNC: 8.6 G/DL (ref 11.5–16)
HGB BLD-MCNC: 8.7 G/DL (ref 11.5–16)
HGB BLD-MCNC: 8.9 G/DL (ref 11.5–16)
HGB BLD-MCNC: 9.1 G/DL (ref 11.5–16)
HGB BLD-MCNC: 9.7 G/DL (ref 11.5–16)
HGB UR QL STRIP: ABNORMAL
IMM GRANULOCYTES # BLD AUTO: 0.1 K/UL (ref 0–0.04)
IMM GRANULOCYTES # BLD AUTO: 0.1 K/UL (ref 0–0.04)
IMM GRANULOCYTES NFR BLD AUTO: 1 % (ref 0–0.5)
IMM GRANULOCYTES NFR BLD AUTO: 1 % (ref 0–0.5)
IRON SATN MFR SERPL: 34 % (ref 20–50)
IRON SERPL-MCNC: 70 UG/DL (ref 35–150)
KETONES UR QL STRIP.AUTO: ABNORMAL MG/DL
KETONES UR QL STRIP.AUTO: ABNORMAL MG/DL
KETONES UR QL STRIP.AUTO: NEGATIVE MG/DL
LEUKOCYTE ESTERASE UR QL STRIP.AUTO: ABNORMAL
LYMPHOCYTES # BLD: 0.9 K/UL (ref 0.8–3.5)
LYMPHOCYTES # BLD: 0.9 K/UL (ref 0.8–3.5)
LYMPHOCYTES NFR BLD: 10 % (ref 12–49)
LYMPHOCYTES NFR BLD: 13 % (ref 12–49)
MCH RBC QN AUTO: 34 PG (ref 26–34)
MCH RBC QN AUTO: 35.1 PG (ref 26–34)
MCH RBC QN AUTO: 35.2 PG (ref 26–34)
MCH RBC QN AUTO: 35.4 PG (ref 26–34)
MCH RBC QN AUTO: 35.7 PG (ref 26–34)
MCHC RBC AUTO-ENTMCNC: 31.8 G/DL (ref 30–36.5)
MCHC RBC AUTO-ENTMCNC: 32 G/DL (ref 30–36.5)
MCHC RBC AUTO-ENTMCNC: 32.3 G/DL (ref 30–36.5)
MCHC RBC AUTO-ENTMCNC: 32.3 G/DL (ref 30–36.5)
MCHC RBC AUTO-ENTMCNC: 33.1 G/DL (ref 30–36.5)
MCV RBC AUTO: 105.3 FL (ref 80–99)
MCV RBC AUTO: 106.3 FL (ref 80–99)
MCV RBC AUTO: 110.5 FL (ref 80–99)
MCV RBC AUTO: 110.6 FL (ref 80–99)
MCV RBC AUTO: 110.7 FL (ref 80–99)
MONOCYTES # BLD: 0.8 K/UL (ref 0–1)
MONOCYTES # BLD: 0.9 K/UL (ref 0–1)
MONOCYTES NFR BLD: 13 % (ref 5–13)
MONOCYTES NFR BLD: 9 % (ref 5–13)
MUCOUS THREADS URNS QL MICRO: ABNORMAL /LPF
NEUTS SEG # BLD: 5.3 K/UL (ref 1.8–8)
NEUTS SEG # BLD: 6.9 K/UL (ref 1.8–8)
NEUTS SEG NFR BLD: 72 % (ref 32–75)
NEUTS SEG NFR BLD: 79 % (ref 32–75)
NITRITE UR QL STRIP.AUTO: NEGATIVE
NITRITE UR QL STRIP.AUTO: NEGATIVE
NITRITE UR QL STRIP.AUTO: POSITIVE
NRBC # BLD: 0 K/UL (ref 0–0.01)
NRBC # BLD: 0.03 K/UL (ref 0–0.01)
NRBC BLD-RTO: 0 PER 100 WBC
NRBC BLD-RTO: 0.4 PER 100 WBC
PH UR STRIP: 6 [PH] (ref 5–8)
PH UR STRIP: 7.5 [PH] (ref 5–8)
PH UR STRIP: 8 [PH] (ref 5–8)
PLATELET # BLD AUTO: 192 K/UL (ref 150–400)
PLATELET # BLD AUTO: 225 K/UL (ref 150–400)
PLATELET # BLD AUTO: 239 K/UL (ref 150–400)
PLATELET # BLD AUTO: 248 K/UL (ref 150–400)
PLATELET # BLD AUTO: 307 K/UL (ref 150–400)
PMV BLD AUTO: 11.2 FL (ref 8.9–12.9)
PMV BLD AUTO: 11.8 FL (ref 8.9–12.9)
PMV BLD AUTO: 11.8 FL (ref 8.9–12.9)
PMV BLD AUTO: 11.9 FL (ref 8.9–12.9)
PMV BLD AUTO: 12.1 FL (ref 8.9–12.9)
POTASSIUM SERPL-SCNC: 3 MMOL/L (ref 3.5–5.1)
POTASSIUM SERPL-SCNC: 3 MMOL/L (ref 3.5–5.1)
POTASSIUM SERPL-SCNC: 3.1 MMOL/L (ref 3.5–5.1)
POTASSIUM SERPL-SCNC: 3.4 MMOL/L (ref 3.5–5.1)
PROT SERPL-MCNC: 6.5 G/DL (ref 6.4–8.2)
PROT SERPL-MCNC: 8.1 G/DL (ref 6.4–8.2)
PROT UR STRIP-MCNC: 30 MG/DL
PROT UR STRIP-MCNC: ABNORMAL MG/DL
PROT UR STRIP-MCNC: NEGATIVE MG/DL
RBC # BLD AUTO: 2.43 M/UL (ref 3.8–5.2)
RBC # BLD AUTO: 2.48 M/UL (ref 3.8–5.2)
RBC # BLD AUTO: 2.53 M/UL (ref 3.8–5.2)
RBC # BLD AUTO: 2.55 M/UL (ref 3.8–5.2)
RBC # BLD AUTO: 2.85 M/UL (ref 3.8–5.2)
RBC #/AREA URNS HPF: ABNORMAL /HPF (ref 0–5)
RBC MORPH BLD: ABNORMAL
RBC MORPH BLD: ABNORMAL
SERVICE CMNT-IMP: ABNORMAL
SERVICE CMNT-IMP: ABNORMAL
SODIUM SERPL-SCNC: 136 MMOL/L (ref 136–145)
SODIUM SERPL-SCNC: 138 MMOL/L (ref 136–145)
SODIUM SERPL-SCNC: 140 MMOL/L (ref 136–145)
SODIUM SERPL-SCNC: 140 MMOL/L (ref 136–145)
SP GR UR REFRACTOMETRY: 1.01
SP GR UR REFRACTOMETRY: 1.01 (ref 1–1.03)
SP GR UR REFRACTOMETRY: <1.005
T4 FREE SERPL-MCNC: 1 NG/DL (ref 0.8–1.5)
T4 FREE SERPL-MCNC: 1.2 NG/DL (ref 0.8–1.5)
T4 FREE SERPL-MCNC: 1.3 NG/DL (ref 0.8–1.5)
TIBC SERPL-MCNC: 204 UG/DL (ref 250–450)
TSH SERPL DL<=0.05 MIU/L-ACNC: 0.05 UIU/ML (ref 0.36–3.74)
TSH SERPL DL<=0.05 MIU/L-ACNC: 0.19 UIU/ML (ref 0.36–3.74)
TSH SERPL DL<=0.05 MIU/L-ACNC: 5.98 UIU/ML (ref 0.36–3.74)
UA: UC IF INDICATED,UAUC: ABNORMAL
UROBILINOGEN UR QL STRIP.AUTO: 1 EU/DL (ref 0.2–1)
UROBILINOGEN UR QL STRIP.AUTO: 1 EU/DL (ref 0.2–1)
UROBILINOGEN UR QL STRIP.AUTO: 2 EU/DL (ref 0.2–1)
VIT B12 SERPL-MCNC: 377 PG/ML (ref 193–986)
WBC # BLD AUTO: 6.1 K/UL (ref 3.6–11)
WBC # BLD AUTO: 7.3 K/UL (ref 3.6–11)
WBC # BLD AUTO: 7.4 K/UL (ref 3.6–11)
WBC # BLD AUTO: 7.5 K/UL (ref 3.6–11)
WBC # BLD AUTO: 8.7 K/UL (ref 3.6–11)
WBC URNS QL MICRO: ABNORMAL /HPF (ref 0–4)

## 2022-01-01 PROCEDURE — 3331090002 HH PPS REVENUE DEBIT

## 2022-01-01 PROCEDURE — 0656 HSPC GENERAL INPATIENT

## 2022-01-01 PROCEDURE — G0151 HHCP-SERV OF PT,EA 15 MIN: HCPCS

## 2022-01-01 PROCEDURE — 1101F PT FALLS ASSESS-DOCD LE1/YR: CPT | Performed by: NURSE PRACTITIONER

## 2022-01-01 PROCEDURE — 1090F PRES/ABSN URINE INCON ASSESS: CPT | Performed by: NURSE PRACTITIONER

## 2022-01-01 PROCEDURE — 99214 OFFICE O/P EST MOD 30 MIN: CPT | Performed by: PSYCHIATRY & NEUROLOGY

## 2022-01-01 PROCEDURE — 0651 HSPC ROUTINE HOME CARE

## 2022-01-01 PROCEDURE — G0152 HHCP-SERV OF OT,EA 15 MIN: HCPCS

## 2022-01-01 PROCEDURE — 400018 HH-NO PAY CLAIM PROCEDURE

## 2022-01-01 PROCEDURE — 3331090001 HH PPS REVENUE CREDIT

## 2022-01-01 PROCEDURE — 74011250636 HC RX REV CODE- 250/636: Performed by: NURSE PRACTITIONER

## 2022-01-01 PROCEDURE — 70450 CT HEAD/BRAIN W/O DYE: CPT

## 2022-01-01 PROCEDURE — 74011250637 HC RX REV CODE- 250/637: Performed by: NURSE PRACTITIONER

## 2022-01-01 PROCEDURE — HOSPICE MEDICATION HC HH HOSPICE MEDICATION

## 2022-01-01 PROCEDURE — G8427 DOCREV CUR MEDS BY ELIG CLIN: HCPCS | Performed by: NURSE PRACTITIONER

## 2022-01-01 PROCEDURE — 74011250637 HC RX REV CODE- 250/637: Performed by: FAMILY MEDICINE

## 2022-01-01 PROCEDURE — G8536 NO DOC ELDER MAL SCRN: HCPCS | Performed by: NURSE PRACTITIONER

## 2022-01-01 PROCEDURE — G8420 CALC BMI NORM PARAMETERS: HCPCS | Performed by: PSYCHIATRY & NEUROLOGY

## 2022-01-01 PROCEDURE — 80053 COMPREHEN METABOLIC PANEL: CPT

## 2022-01-01 PROCEDURE — G0156 HHCP-SVS OF AIDE,EA 15 MIN: HCPCS

## 2022-01-01 PROCEDURE — 1101F PT FALLS ASSESS-DOCD LE1/YR: CPT | Performed by: PSYCHIATRY & NEUROLOGY

## 2022-01-01 PROCEDURE — 81001 URINALYSIS AUTO W/SCOPE: CPT

## 2022-01-01 PROCEDURE — 3336500001 HSPC ELECTION

## 2022-01-01 PROCEDURE — G8427 DOCREV CUR MEDS BY ELIG CLIN: HCPCS | Performed by: PSYCHIATRY & NEUROLOGY

## 2022-01-01 PROCEDURE — 99283 EMERGENCY DEPT VISIT LOW MDM: CPT

## 2022-01-01 PROCEDURE — 74011250636 HC RX REV CODE- 250/636: Performed by: FAMILY MEDICINE

## 2022-01-01 PROCEDURE — G8432 DEP SCR NOT DOC, RNG: HCPCS | Performed by: PSYCHIATRY & NEUROLOGY

## 2022-01-01 PROCEDURE — 87086 URINE CULTURE/COLONY COUNT: CPT

## 2022-01-01 PROCEDURE — 1123F ACP DISCUSS/DSCN MKR DOCD: CPT | Performed by: NURSE PRACTITIONER

## 2022-01-01 PROCEDURE — 400013 HH SOC

## 2022-01-01 PROCEDURE — G0299 HHS/HOSPICE OF RN EA 15 MIN: HCPCS

## 2022-01-01 PROCEDURE — G8536 NO DOC ELDER MAL SCRN: HCPCS | Performed by: PSYCHIATRY & NEUROLOGY

## 2022-01-01 PROCEDURE — G8427 DOCREV CUR MEDS BY ELIG CLIN: HCPCS | Performed by: FAMILY MEDICINE

## 2022-01-01 PROCEDURE — 73030 X-RAY EXAM OF SHOULDER: CPT

## 2022-01-01 PROCEDURE — 99350 HOME/RES VST EST HIGH MDM 60: CPT | Performed by: NURSE PRACTITIONER

## 2022-01-01 PROCEDURE — 0655 HSPC INPATIENT RESPITE

## 2022-01-01 PROCEDURE — G8536 NO DOC ELDER MAL SCRN: HCPCS | Performed by: FAMILY MEDICINE

## 2022-01-01 PROCEDURE — 72125 CT NECK SPINE W/O DYE: CPT

## 2022-01-01 PROCEDURE — 74011250637 HC RX REV CODE- 250/637: Performed by: EMERGENCY MEDICINE

## 2022-01-01 PROCEDURE — 1090F PRES/ABSN URINE INCON ASSESS: CPT | Performed by: FAMILY MEDICINE

## 2022-01-01 PROCEDURE — 99350 HOME/RES VST EST HIGH MDM 60: CPT | Performed by: FAMILY MEDICINE

## 2022-01-01 PROCEDURE — 99497 ADVNCD CARE PLAN 30 MIN: CPT | Performed by: NURSE PRACTITIONER

## 2022-01-01 PROCEDURE — 74011250637 HC RX REV CODE- 250/637: Performed by: PHYSICIAN ASSISTANT

## 2022-01-01 PROCEDURE — 87186 SC STD MICRODIL/AGAR DIL: CPT

## 2022-01-01 PROCEDURE — 1101F PT FALLS ASSESS-DOCD LE1/YR: CPT | Performed by: FAMILY MEDICINE

## 2022-01-01 PROCEDURE — 36415 COLL VENOUS BLD VENIPUNCTURE: CPT

## 2022-01-01 PROCEDURE — 99284 EMERGENCY DEPT VISIT MOD MDM: CPT

## 2022-01-01 PROCEDURE — G0155 HHCP-SVS OF CSW,EA 15 MIN: HCPCS

## 2022-01-01 PROCEDURE — 99349 HOME/RES VST EST MOD MDM 40: CPT | Performed by: NURSE PRACTITIONER

## 2022-01-01 PROCEDURE — 85025 COMPLETE CBC W/AUTO DIFF WBC: CPT

## 2022-01-01 PROCEDURE — 1090F PRES/ABSN URINE INCON ASSESS: CPT | Performed by: PSYCHIATRY & NEUROLOGY

## 2022-01-01 PROCEDURE — 84443 ASSAY THYROID STIM HORMONE: CPT

## 2022-01-01 PROCEDURE — 84439 ASSAY OF FREE THYROXINE: CPT

## 2022-01-01 PROCEDURE — 87077 CULTURE AEROBIC IDENTIFY: CPT

## 2022-01-01 RX ORDER — METOPROLOL SUCCINATE 50 MG/1
50 TABLET, EXTENDED RELEASE ORAL DAILY
Status: DISCONTINUED | OUTPATIENT
Start: 2022-01-01 | End: 2022-01-01 | Stop reason: HOSPADM

## 2022-01-01 RX ORDER — LISINOPRIL 20 MG/1
TABLET ORAL
Qty: 30 TABLET | Refills: 5 | OUTPATIENT
Start: 2022-01-01

## 2022-01-01 RX ORDER — NYSTATIN 100000 [USP'U]/ML
1 SUSPENSION ORAL 4 TIMES DAILY
Qty: 480 ML | Refills: 0 | Status: SHIPPED | OUTPATIENT
Start: 2022-01-01 | End: 2022-01-01 | Stop reason: ALTCHOICE

## 2022-01-01 RX ORDER — METOPROLOL SUCCINATE 50 MG/1
50 TABLET, EXTENDED RELEASE ORAL DAILY
Qty: 30 TABLET | Refills: 5 | Status: SHIPPED | OUTPATIENT
Start: 2022-01-01

## 2022-01-01 RX ORDER — HALOPERIDOL 2 MG/ML
1 SOLUTION ORAL
Status: DISCONTINUED | OUTPATIENT
Start: 2022-01-01 | End: 2022-01-01 | Stop reason: HOSPADM

## 2022-01-01 RX ORDER — MORPHINE SULFATE 20 MG/ML
10 SOLUTION ORAL
Status: DISCONTINUED | OUTPATIENT
Start: 2022-01-01 | End: 2022-01-01

## 2022-01-01 RX ORDER — DICLOFENAC SODIUM 10 MG/G
2 GEL TOPICAL EVERY 6 HOURS
Qty: 100 G | Refills: 2 | Status: SHIPPED | OUTPATIENT
Start: 2022-01-01 | End: 2022-01-01 | Stop reason: SDUPTHER

## 2022-01-01 RX ORDER — NYSTATIN 100000 [USP'U]/ML
1 SUSPENSION ORAL 4 TIMES DAILY
Qty: 473 ML | Refills: 0 | Status: SHIPPED | OUTPATIENT
Start: 2022-01-01 | End: 2022-01-01

## 2022-01-01 RX ORDER — LORAZEPAM 1 MG/1
0.5 TABLET ORAL
Status: DISCONTINUED | OUTPATIENT
Start: 2022-01-01 | End: 2022-01-01

## 2022-01-01 RX ORDER — ACETAMINOPHEN 500 MG
500 TABLET ORAL
Status: DISCONTINUED | OUTPATIENT
Start: 2022-01-01 | End: 2022-01-01 | Stop reason: HOSPADM

## 2022-01-01 RX ORDER — DEXTROMETHORPHAN HYDROBROMIDE, GUAIFENESIN 5; 100 MG/5ML; MG/5ML
650 LIQUID ORAL
Qty: 90 TABLET | Refills: 5 | Status: SHIPPED | OUTPATIENT
Start: 2022-01-01

## 2022-01-01 RX ORDER — MORPHINE SULFATE 2 MG/ML
2 INJECTION, SOLUTION INTRAMUSCULAR; INTRAVENOUS EVERY 4 HOURS
Status: DISCONTINUED | OUTPATIENT
Start: 2022-01-01 | End: 2022-01-01 | Stop reason: HOSPADM

## 2022-01-01 RX ORDER — NITROFURANTOIN 25; 75 MG/1; MG/1
100 CAPSULE ORAL 2 TIMES DAILY
Qty: 14 CAPSULE | Refills: 0 | Status: SHIPPED | OUTPATIENT
Start: 2022-01-01 | End: 2022-01-01

## 2022-01-01 RX ORDER — FACIAL-BODY WIPES
10 EACH TOPICAL DAILY PRN
Status: DISCONTINUED | OUTPATIENT
Start: 2022-01-01 | End: 2022-01-01 | Stop reason: HOSPADM

## 2022-01-01 RX ORDER — DONEPEZIL HYDROCHLORIDE 10 MG/1
10 TABLET, FILM COATED ORAL
Qty: 90 TABLET | Refills: 3 | Status: SHIPPED | OUTPATIENT
Start: 2022-01-01

## 2022-01-01 RX ORDER — MORPHINE SULFATE 2 MG/ML
2 INJECTION, SOLUTION INTRAMUSCULAR; INTRAVENOUS
Status: DISCONTINUED | OUTPATIENT
Start: 2022-01-01 | End: 2022-01-01 | Stop reason: HOSPADM

## 2022-01-01 RX ORDER — LORAZEPAM 2 MG/ML
1 CONCENTRATE ORAL
Status: DISCONTINUED | OUTPATIENT
Start: 2022-01-01 | End: 2022-01-01

## 2022-01-01 RX ORDER — GUAIFENESIN 100 MG/5ML
LIQUID (ML) ORAL
Qty: 90 TABLET | Refills: 2 | Status: SHIPPED | OUTPATIENT
Start: 2022-01-01 | End: 2022-01-01 | Stop reason: SDUPTHER

## 2022-01-01 RX ORDER — METOPROLOL SUCCINATE 50 MG/1
TABLET, EXTENDED RELEASE ORAL
Qty: 30 TABLET | Refills: 5 | OUTPATIENT
Start: 2022-01-01

## 2022-01-01 RX ORDER — MORPHINE SULFATE 20 MG/ML
5 SOLUTION ORAL
Status: DISCONTINUED | OUTPATIENT
Start: 2022-01-01 | End: 2022-01-01

## 2022-01-01 RX ORDER — SIMVASTATIN 20 MG/1
20 TABLET, FILM COATED ORAL
Qty: 90 TABLET | Refills: 3 | Status: SHIPPED | OUTPATIENT
Start: 2022-01-01 | End: 2022-01-01 | Stop reason: SDUPTHER

## 2022-01-01 RX ORDER — MELATONIN
2000 DAILY
Status: DISCONTINUED | OUTPATIENT
Start: 2022-01-01 | End: 2022-01-01

## 2022-01-01 RX ORDER — DONEPEZIL HYDROCHLORIDE 10 MG/1
TABLET, FILM COATED ORAL
Qty: 30 TABLET | Refills: 8 | Status: SHIPPED | OUTPATIENT
Start: 2022-01-01 | End: 2022-01-01 | Stop reason: SDUPTHER

## 2022-01-01 RX ORDER — TRAZODONE HYDROCHLORIDE 50 MG/1
50 TABLET ORAL
Qty: 30 TABLET | Refills: 8 | Status: SHIPPED | OUTPATIENT
Start: 2022-01-01 | End: 2022-01-01 | Stop reason: SDUPTHER

## 2022-01-01 RX ORDER — CEPHALEXIN 250 MG/1
250 CAPSULE ORAL
Status: COMPLETED | OUTPATIENT
Start: 2022-01-01 | End: 2022-01-01

## 2022-01-01 RX ORDER — LISINOPRIL 20 MG/1
20 TABLET ORAL DAILY
Qty: 30 TABLET | Refills: 5 | Status: SHIPPED | OUTPATIENT
Start: 2022-01-01

## 2022-01-01 RX ORDER — MIDAZOLAM HYDROCHLORIDE 1 MG/ML
2 INJECTION, SOLUTION INTRAMUSCULAR; INTRAVENOUS
Status: DISCONTINUED | OUTPATIENT
Start: 2022-01-01 | End: 2022-01-01 | Stop reason: HOSPADM

## 2022-01-01 RX ORDER — LISINOPRIL 20 MG/1
TABLET ORAL
Qty: 30 TABLET | Refills: 5 | Status: SHIPPED | OUTPATIENT
Start: 2022-01-01 | End: 2022-01-01 | Stop reason: SDUPTHER

## 2022-01-01 RX ORDER — LORAZEPAM 2 MG/ML
0.5 INJECTION, SOLUTION INTRAMUSCULAR; INTRAVENOUS
Status: DISCONTINUED | OUTPATIENT
Start: 2022-01-01 | End: 2022-01-01

## 2022-01-01 RX ORDER — DICLOFENAC SODIUM 10 MG/G
2 GEL TOPICAL
Status: DISCONTINUED | OUTPATIENT
Start: 2022-01-01 | End: 2022-01-01 | Stop reason: HOSPADM

## 2022-01-01 RX ORDER — KETOROLAC TROMETHAMINE 30 MG/ML
30 INJECTION, SOLUTION INTRAMUSCULAR; INTRAVENOUS
Status: DISCONTINUED | OUTPATIENT
Start: 2022-01-01 | End: 2022-01-01 | Stop reason: HOSPADM

## 2022-01-01 RX ORDER — AMLODIPINE BESYLATE 5 MG/1
5 TABLET ORAL DAILY
Status: DISCONTINUED | OUTPATIENT
Start: 2022-01-01 | End: 2022-01-01

## 2022-01-01 RX ORDER — METHIMAZOLE 5 MG/1
10 TABLET ORAL DAILY
Status: DISCONTINUED | OUTPATIENT
Start: 2022-01-01 | End: 2022-01-01

## 2022-01-01 RX ORDER — HYOSCYAMINE SULFATE 0.12 MG/1
0.12 TABLET SUBLINGUAL
Status: DISCONTINUED | OUTPATIENT
Start: 2022-01-01 | End: 2022-01-01 | Stop reason: HOSPADM

## 2022-01-01 RX ORDER — DONEPEZIL HYDROCHLORIDE 5 MG/1
10 TABLET, FILM COATED ORAL
Status: DISCONTINUED | OUTPATIENT
Start: 2022-01-01 | End: 2022-01-01

## 2022-01-01 RX ORDER — TRAZODONE HYDROCHLORIDE 50 MG/1
50 TABLET ORAL
Qty: 90 TABLET | Refills: 2 | Status: SHIPPED | OUTPATIENT
Start: 2022-01-01

## 2022-01-01 RX ORDER — LORAZEPAM 2 MG/ML
2 CONCENTRATE ORAL
Status: DISCONTINUED | OUTPATIENT
Start: 2022-01-01 | End: 2022-01-01

## 2022-01-01 RX ORDER — PROCHLORPERAZINE MALEATE 10 MG
10 TABLET ORAL
Status: DISCONTINUED | OUTPATIENT
Start: 2022-01-01 | End: 2022-01-01 | Stop reason: HOSPADM

## 2022-01-01 RX ORDER — MORPHINE SULFATE 2 MG/ML
2 INJECTION, SOLUTION INTRAMUSCULAR; INTRAVENOUS
Status: DISCONTINUED | OUTPATIENT
Start: 2022-01-01 | End: 2022-01-01

## 2022-01-01 RX ORDER — TRAZODONE HYDROCHLORIDE 50 MG/1
50 TABLET ORAL
Status: DISCONTINUED | OUTPATIENT
Start: 2022-01-01 | End: 2022-01-01

## 2022-01-01 RX ORDER — DICLOFENAC SODIUM 10 MG/G
2 GEL TOPICAL EVERY 6 HOURS
Qty: 100 G | Refills: 2 | Status: SHIPPED | OUTPATIENT
Start: 2022-01-01

## 2022-01-01 RX ORDER — NYSTATIN 100000 [USP'U]/ML
500000 SUSPENSION ORAL
Status: DISCONTINUED | OUTPATIENT
Start: 2022-01-01 | End: 2022-01-01 | Stop reason: HOSPADM

## 2022-01-01 RX ORDER — SIMVASTATIN 20 MG/1
20 TABLET, FILM COATED ORAL
Qty: 90 TABLET | Refills: 1 | Status: SHIPPED | OUTPATIENT
Start: 2022-01-01

## 2022-01-01 RX ORDER — ACETAMINOPHEN 650 MG/1
650 SUPPOSITORY RECTAL
Status: DISCONTINUED | OUTPATIENT
Start: 2022-01-01 | End: 2022-01-01 | Stop reason: HOSPADM

## 2022-01-01 RX ORDER — CEPHALEXIN 250 MG/1
250 CAPSULE ORAL 4 TIMES DAILY
Qty: 28 CAPSULE | Refills: 0 | Status: SHIPPED | OUTPATIENT
Start: 2022-01-01 | End: 2022-01-01

## 2022-01-01 RX ORDER — LORATADINE 10 MG/1
TABLET ORAL
Qty: 30 TABLET | Refills: 5 | OUTPATIENT
Start: 2022-01-01

## 2022-01-01 RX ORDER — MIDAZOLAM HYDROCHLORIDE 1 MG/ML
2 INJECTION, SOLUTION INTRAMUSCULAR; INTRAVENOUS EVERY 4 HOURS
Status: DISCONTINUED | OUTPATIENT
Start: 2022-01-01 | End: 2022-01-01 | Stop reason: HOSPADM

## 2022-01-01 RX ORDER — DICLOFENAC SODIUM 10 MG/G
GEL TOPICAL
Qty: 100 G | Refills: 2 | OUTPATIENT
Start: 2022-01-01

## 2022-01-01 RX ORDER — PROCHLORPERAZINE MALEATE 5 MG
10 TABLET ORAL
Status: DISCONTINUED | OUTPATIENT
Start: 2022-01-01 | End: 2022-01-01

## 2022-01-01 RX ORDER — POTASSIUM CHLORIDE 750 MG/1
40 TABLET, FILM COATED, EXTENDED RELEASE ORAL
Status: COMPLETED | OUTPATIENT
Start: 2022-01-01 | End: 2022-01-01

## 2022-01-01 RX ORDER — CHOLECALCIFEROL (VITAMIN D3) 25 MCG
TABLET ORAL
Qty: 60 TABLET | Refills: 11 | Status: SHIPPED | OUTPATIENT
Start: 2022-01-01

## 2022-01-01 RX ORDER — HYDROCODONE BITARTRATE AND ACETAMINOPHEN 5; 325 MG/1; MG/1
1 TABLET ORAL
Status: DISCONTINUED | OUTPATIENT
Start: 2022-01-01 | End: 2022-01-01

## 2022-01-01 RX ORDER — LISINOPRIL 5 MG/1
20 TABLET ORAL DAILY
Status: DISCONTINUED | OUTPATIENT
Start: 2022-01-01 | End: 2022-01-01

## 2022-01-01 RX ORDER — GUAIFENESIN 100 MG/5ML
LIQUID (ML) ORAL
Qty: 90 TABLET | Refills: 2 | OUTPATIENT
Start: 2022-01-01

## 2022-01-01 RX ORDER — LORATADINE 10 MG/1
10 TABLET ORAL DAILY
Qty: 90 TABLET | Refills: 1 | Status: SHIPPED | OUTPATIENT
Start: 2022-01-01

## 2022-01-01 RX ORDER — POTASSIUM CHLORIDE 750 MG/1
20 TABLET, FILM COATED, EXTENDED RELEASE ORAL DAILY
Qty: 20 TABLET | Refills: 0 | Status: SHIPPED | OUTPATIENT
Start: 2022-01-01 | End: 2022-01-01

## 2022-01-01 RX ORDER — METHIMAZOLE 10 MG/1
10 TABLET ORAL DAILY
Qty: 30 TABLET | Refills: 5 | Status: SHIPPED | OUTPATIENT
Start: 2022-01-01

## 2022-01-01 RX ADMIN — MORPHINE SULFATE 2 MG: 2 INJECTION, SOLUTION INTRAMUSCULAR; INTRAVENOUS at 09:25

## 2022-01-01 RX ADMIN — MORPHINE SULFATE 2 MG: 2 INJECTION, SOLUTION INTRAMUSCULAR; INTRAVENOUS at 07:57

## 2022-01-01 RX ADMIN — Medication 1 MG: at 03:30

## 2022-01-01 RX ADMIN — MIDAZOLAM HYDROCHLORIDE 2 MG: 1 INJECTION, SOLUTION INTRAMUSCULAR; INTRAVENOUS at 13:05

## 2022-01-01 RX ADMIN — MORPHINE SULFATE 5 MG: 10 SOLUTION ORAL at 04:32

## 2022-01-01 RX ADMIN — Medication 1 MG: at 22:12

## 2022-01-01 RX ADMIN — MORPHINE SULFATE 5 MG: 10 SOLUTION ORAL at 03:30

## 2022-01-01 RX ADMIN — MIDAZOLAM HYDROCHLORIDE 2 MG: 1 INJECTION, SOLUTION INTRAMUSCULAR; INTRAVENOUS at 17:10

## 2022-01-01 RX ADMIN — Medication 1 MG: at 04:32

## 2022-01-01 RX ADMIN — Medication 1 MG: at 05:29

## 2022-01-01 RX ADMIN — LORAZEPAM 0.5 MG: 2 INJECTION, SOLUTION INTRAMUSCULAR; INTRAVENOUS at 07:57

## 2022-01-01 RX ADMIN — ACETAMINOPHEN 650 MG: 650 SUPPOSITORY RECTAL at 20:40

## 2022-01-01 RX ADMIN — MIDAZOLAM HYDROCHLORIDE 2 MG: 1 INJECTION, SOLUTION INTRAMUSCULAR; INTRAVENOUS at 09:25

## 2022-01-01 RX ADMIN — CEPHALEXIN 250 MG: 250 CAPSULE ORAL at 19:17

## 2022-01-01 RX ADMIN — MORPHINE SULFATE 2 MG: 2 INJECTION, SOLUTION INTRAMUSCULAR; INTRAVENOUS at 20:33

## 2022-01-01 RX ADMIN — LORAZEPAM 0.5 MG: 2 INJECTION, SOLUTION INTRAMUSCULAR; INTRAVENOUS at 06:04

## 2022-01-01 RX ADMIN — MORPHINE SULFATE 2 MG: 2 INJECTION, SOLUTION INTRAMUSCULAR; INTRAVENOUS at 17:11

## 2022-01-01 RX ADMIN — MIDAZOLAM HYDROCHLORIDE 2 MG: 1 INJECTION, SOLUTION INTRAMUSCULAR; INTRAVENOUS at 20:33

## 2022-01-01 RX ADMIN — Medication 1 MG: at 18:04

## 2022-01-01 RX ADMIN — MORPHINE SULFATE 5 MG: 10 SOLUTION ORAL at 22:12

## 2022-01-01 RX ADMIN — MORPHINE SULFATE 2 MG: 2 INJECTION, SOLUTION INTRAMUSCULAR; INTRAVENOUS at 13:05

## 2022-01-01 RX ADMIN — MORPHINE SULFATE 5 MG: 10 SOLUTION ORAL at 18:04

## 2022-01-01 RX ADMIN — POTASSIUM CHLORIDE 40 MEQ: 750 TABLET, EXTENDED RELEASE ORAL at 16:41

## 2022-01-01 RX ADMIN — MORPHINE SULFATE 2 MG: 2 INJECTION, SOLUTION INTRAMUSCULAR; INTRAVENOUS at 06:04

## 2022-01-07 NOTE — TELEPHONE ENCOUNTER
[address] [address]  Phone:  [phone]        Fax:  [phone]    Patient:  Kylah Zazueta  YOB: 1930      Received [facility] referral for Alfred William from Mau Escalante NP. Per chart, patient's daughter Carson Tinsley is currently in rehab and best contact is patient's niece Coni Pitcher [phone]. This nurse called and spoke with Nicci Shen who answered the questions below. Preliminary Intake Note:     Address:   [address] [place] [address].[place] [address]    Does patient live within 10 miles of [address] [place] [address] at address listed above?      yes       Distance from [address] [place] [address]/Travel Time:   5.8 miles/ 12 minutes   Region:  [place], near [address] [place] [address] [employer]    Does patient qualify based on address and insurance? Yes                                                                                                                                                                                                                                                                                                                              ** Note:  Date of Birth in [address] [place] [address] is listed as 1930.  on insurance card is 1930. Nicci Shen states that patient's birth certificate says . However, patient's mother always told her that she was actually born on 1930 so Epic has that date listed. **    Date of Referral:  2021  Referred By, or How did you hear about our program?   Referred by patient's current PCP, Mau Escalante NP    Reason for Referral:  homebound    Diagnosis:   Dementia, HTN, subclinical hyperthyroidism, hyperlipidemia, hx of CVA , bilat chronic knee pain    Last PCP visit:   2021    Last Hospitalization:   None in the past year    Nursing Home/Rehab stay in the past year?    no    Primary Caregiver:   Taisha Ward  Relation to Patient: daughter  Contact Information:   [phone]    Records Needed:   no    Current Controlled Substances:   None    This nurse explained that the [address] [place] [address] team discusses new referrals at our weekly IDG meetings. The next meeting is scheduled for 1/11/22. This nurse will present the referral to the [address] [place] [address] team on 1/11/22 and will call Samara Tanner back to notify her of the decision. She voices understanding.

## 2022-01-12 NOTE — TELEPHONE ENCOUNTER
LCSW called and spoke with pt's niece, Kerri Cabrera, to schedule in-home [facility] intake paperwork completion and initial psychosocial assessment. Visit scheduled for Tuesday 1/18 @ 11am. JASONW informed that if [facility] team meeting is changed to the morning on Tuesday, the time of in-home visit might be moved to the later afternoon. She expressed understanding and stated that pt's dtr (Yeimi's aunt) will be home with pt to sign documents, they are flexible with the timing of the visit.      Nawaf Covington, MARGO, Odin Ge    [facility] [address] [place] [address]  L) [phone]

## 2022-01-13 NOTE — TELEPHONE ENCOUNTER
[facility]   Phone:  [phone]      Fax:  [phone] [address], [address] [place] [address]    Name:  Daniel Beckford  YOB: 1930    HB team discussed Zachary Villegas referral in the weekly IDG meeting on 1/11/22 and patient was approved for [facility] services. This nurse called and spoke with patient's niece Barrie Gibson and informed her that start of care date will be 1/19/22. Rianna Lezama LCSW will be contacting them to set up an appointment for initial SW assessment and to get new patient paperwork signed prior to Dr. Rivas Maravilla start of care visit. Ms. Randymayelin Aguilar voices understanding.       Rach Márquez, BUBBAN, RN-BC, [facility]  Referral Navigator, [facility]

## 2022-02-10 NOTE — PROGRESS NOTES
[facility] , Karolyn Lucio had a home visit scheduled with Mary Anne Richard for 1/18/22 for initial assessment and signing of new patient paperwork. Dr. Magalys Trujillo had a start of care visit scheduled for 1/19/22. Family cancelled both appointments saying patient's daughter would not be able to be home either day. Will need to reschedule both visits.

## 2022-02-23 NOTE — TELEPHONE ENCOUNTER
[facility]   Phone:  [phone]      Fax:  [phone] [employer], [facility], [address]    Name:  Yahaira Dallas  YOB: 1930    Returned call to patient's daughter Santos Snow who is requesting re-scheduling of her mother Josephine Vicente [facility] start of care visit. Family cancelled patient's original start of care visit with Dr. Nila Vinson that was scheduled for 1/19/22. This nurse will inform [facility] team that pt/family is still interested in [address] [place] [address] and would like to be seen.       Leatha Luciano, BUBBAN, RN-BC, [facility]  Referral Navigator, [facility] Based [facility] Supportive [facility]

## 2022-02-24 NOTE — TELEPHONE ENCOUNTER
LCSW called and spoke with pt's dtr, Mika Romero, to inform that [facility] physician has scheduled the initial provider visit for Monday March 7th. LCSW scheduled in-home visit for intake paperwork completion on Wednesday March 2nd @ 2:30pm. Mika Romero states that she signs documents for mother, who is physically unable to sign for herself.      MARGO Lorenz, [facility]    [address]  (t) [phone]

## 2022-03-02 NOTE — PROGRESS NOTES
Home Based Primary Care   [phone]  Initial Social Work Assessment    Date and Time of Initial In-Home Visit: 3/2/2021    Reason for Assessment: [facility] intake paperwork completion, initial in-home social work assessment    Sources of Information: Pt's dtrGertrudis    SOCIAL HISTORY  Relationship Status:   [] Single  []   [] Significant Other  []   [x] /  [] Other:     Living Circumstances: Pt lives at home with her 2 daughters Rachel Zacarias and Lucio and son Miah Pelaez    Current/Type of Housing:  [] Public/subsidized housing  [] Apartment, Is there an elevator:   [] Assisted Living  [x] [employer], How many floors: 2, pt stays on the main living level of the home. Hospital bed set up in living room. FINANCIAL/INSURANCE  Income per month: Daughter is unsure how much mom rec's/month, but believes it is more than $1200, less than $2000. Source of Income:    [x] Social Security   [] SSI   [] Pension   [] Employment Income   [] [address]:   [x] Medicare   [] Medicaid   [] [employer]   [x] Other: [employer] Medicare Supplement    Are you having trouble paying for things like rent, food, medications? Not at this time    Is there an agency or person who pays your bills? If yes, who? Dtr, Danuta Pichardo, manages finances    ENVIRONMENT CHECKLIST  Food Security: Danuta Pichardo orders grocery delivery through [employer], her brother will go and purchase certain food items if necessary as well    Problem with bed bugs, odors, pests, or pets? Not at this time    Working smoke alarm? [x] Yes [] No    Difficulty getting to exits from the home? No, there is a ramp from front entrance to ground level    Firearm Assessment:   Are there firearms in the home? [] Yes, Where are they kept? [x] No      SOCIAL SUPPORT ENVIRONMENT  Support System: Danuta Pichardo reports that the family has a \"very good\" support system of extended family and friends.  She said \"family is always calling\". How often do social supports visit? As often as they can, although visits have been more limited since pandemic began    How do you socialize? When people come to visit, being with her 3 children who live in the home    Are you involved with any community agencies? Name/Contact: Not at this time    Is or has Adult Protective Services ever been involved? No    In the last 6 months, have you seen a ? Psychiatrist? If yes, they be contacted by [employer] [id number] team? No    Substance Use:   Tobacco? [] Yes [x] No    Alcohol? [] Yes, How many drinks per week: [x] No   Drugs? [] Yes, which drugs:   [x] No    Do you have an Emergency Call Device system? [] Yes, Which brand? [x] No, Are you interested in obtaining and subscribing to an Emergency Call Device system? Not at this time, pt would most likely not understand how to use it properly    COGNITIVE/EMOTIONAL   Mental Status: Pt was awake, alert during today's visit. She could could answer simple questions with familiar responses, but unable to engage in meaningful conversation due to cognitive limitations. Intake paperwork completed by daughter, Nisha Jiang. How is your memory? Per Nisha Jiang, mom's memory \"comes and goes\". She has moments of lucidity, followed by repetition, confusion. In the last 6 months, has anyone told you that you are forgetful? Yes    Do you receive help with ADLs? [x] Yes, Who helps you? How many hours/days? Pt's dtr Saumya Gamma provides most hands-on care as Nisha Jiang has her own health concerns and cannot assist with physical tasks  [] No, Do you need help? TRANSPORTATION NEEDS ASSESSMENT  Can you use public transportation? [] Yes [x] No  Do you use transportation services provided by: Managed Care Organization? Community Service Resource? No    EMERGENCY ACTION PLAN  KELLEN reviewed the Emergency Action Plan with pt and/or family during this initial in-home Social Work assessment.  KELLEN completed Emergency Numbers, reviewed the content areas of Power Loss, Natural Disasters, Clinical Emergencies, Severe Weather, Safety in the Home, and Infection Control. Patient's acuity value consider to be LOW. ADVANCED CARE PLANNING  Per Kim Hickey, pt does not have mPOA documents. She would be unable to complete an AMD due to cognitive limitations. Pt's 3 living children are LNOK. Narrative:  SW visited with patient and daughter, Kim Hickey, in the home. Daughter Micheline [address] was in the home, but not present during today's visit. New patient assessment of psychosocial needs and social determinants of health completed. SW introduced Home Based Primary Care and Supportive Services and reviewed Emergency Action Plan booklet. Pt was sitting in wheelchair, wheeling herself between kitchen and living room for duration of this visit. She did not participate in conversation today due to cognitive limitations. Her pants were below her buttocks, but there was a pad on the wheelchair. Kim Hickey states that she is concerned that mom cannot feel when she is having a bowel movement. She says that mom has frequent BMs, and they suspect that she cannot feel when she is having one. She said she is going to has the physician about this during initial provider visit. Pt had 6 children, 3 are . Her living children are Carol Malcolm, and Torri Quevedo. They all live in the same home. Denita's apartment is being renovated, and her plan is to move back in to her own apartment once the renovation is complete. [address] provides most of the hands-on care to mother, as Torri Quevedo is only at the home at night, and Kim Hickey has physical limitations that make it challenging to assist with physical tasks. Kim Hickey states that she wishes she could help more, but she has her own medical issues. She states that mother becomes agitated and combative at times when [facility] [address] is attempting to provide personal care. Kim Hickey inquired about personal care aide services for mom.  She said mom would not qualify for Medicaid, although she does not know how much mom's income is per month. She thinks it is more than $1,200, but less than $2,000. LCSW encouraged Judith Mondragon to try to find out, and apply for Medicaid if possible, as personal care services otherwise would be an out of pocket cost which pt most likely would not be able to afford long-term. Judith Mondragon expressed understanding.        Plan:   [x] Establish therapeutic relationship through in home visits and telephonic touch points  [] Assist in optimizing levels of psychosocial function  [x] Assess safety concerns and address as appropriate  [x] Assess for caregiver burden and intervene as psychosocially indicated  [] Assess patient for caregiver needs to optimize psychosocial function and safety  [] Provide information on available community resources  [] Initiate conversation regarding health care wishes   [] Assess current Advance Care Planning documents and make ACP recommendations as appropriate  [] Discuss goals of care and treatment preferences  [] Screen for mental health conditions including but not limited to anxiety, depression, and anticipatory grief  [] Offer counseling services for mental health conditions including but not limited to anxiety, depression, and anticipatory grief  [] Engage family in patient health care goals to ensure support for those goals and minimize patient/family conflict  [] Assess cultural needs of patient/family, educate interdisciplinary team and engage appropriate resources    Frequency of Social Work Follow-Up/Visits  [] As needed  [] Bi-monthly  [] Monthly  [] Weekly  [] Other:    Laron Monroe, MSW, [address] Worker  Home Based [address] [place] [address]  [phone]

## 2022-03-07 PROBLEM — Z92.29 COVID-19 VACCINE SERIES COMPLETED: Status: ACTIVE | Noted: 2022-01-01

## 2022-03-07 PROBLEM — Z85.3 HISTORY OF BREAST CANCER: Status: ACTIVE | Noted: 2022-01-01

## 2022-03-07 PROBLEM — Z86.73 HISTORY OF CVA (CEREBROVASCULAR ACCIDENT): Status: ACTIVE | Noted: 2022-01-01

## 2022-03-07 PROBLEM — K59.00 CONSTIPATION: Status: ACTIVE | Noted: 2022-01-01

## 2022-03-07 NOTE — PATIENT INSTRUCTIONS

## 2022-03-07 NOTE — PROGRESS NOTES
Home Based [facility]   [id number]      Date of current visit: 3/7/2022    ASSESSMENT AND PLAN       ICD-10-CM ICD-9-CM    1. Major neurocognitive disorder due to probable Alzheimer's disease, without behavioral disturbance ([facility] [address]  F03.90 294.20    2. Essential hypertension  I10 401.9    3. Subclinical hyperthyroidism  E05.90 242.90    4. Anemia, unspecified type  D64.9 285.9    5. Prediabetes  R73.03 790.29    6. Mixed hyperlipidemia  E78.2 272.2    7. Constipation, unspecified constipation type  K59.00 564.00    8. Difficulty sleeping  G47.9 780.50    9. History of CVA (cerebrovascular accident)  Z86.73 V12.54    10. History of breast cancer  Z85.3 V10.3    11. Establishing care with new doctor, encounter for  Z76.89 V65.8        -Patient is seen today to establish care with [facility]. Last seen by her previous PCP on 9/09/21. No changes were made to her chronic medications at the time.  -Dementia: Continue supportive care. Continue Aricept. Has been followed by Neurology Dr. Luana Mckinnon with next appt reportedly on 4/29. -HTN: BP is reasonable for age today. Continue lisinopril 20 mg daily, amlodipine 5 mg daily, metoprolol XL 50 mg daily.  -Subclinical hyperthyroidism: She has chronically been taking methimazole 5 mg daily. She has low TSH values going back to 2015 in the chart. TSH was low and free T4 normal in October 2019. TSH was low in July 2020. T4 was not checked at the time. TSH and T4 were normal in September 2021. We checked a TSH today and will consider further labs and medication adjustments at next visit. -Anemia: seen on September 2021 labs. Will check cbc and iron panel today. -Prediabetes: A1c was 5.9 on September 2021 labs. Family expresses concern that patient has high sugar intake. We will recheck an A1c today. -HLD: Continue simvastatin 20 mg daily.  -Constipation: Chronic, relatively stable.   Continue linaclotide.  -Difficulty sleeping: Chronic, relatively stable. She takes trazodone 50 mg nightly for this.  -History of CVA: Continue aspirin and statin.  -History of breast cancer: She is taking anastrozole for this and has been followed by oncology Dr. Christin Dozier. -AMD and Code status: We will need to discuss in more detail at next visit. Family says they think she may have a DDNR form but they do not know where this. There is not an AMD or DDNR on file. Patient tells me she would trust her daughters to help make medical decisions for her.  -Labs: We will check basic lab work today including CBC, CMP, TSH.  -Immunizations: COVID-19 vaccination initial series completed, and record updated. Number provided for scheduling in-home booster vaccine. Family says patient typically declines the flu vaccine.  -Follow up: in 2-3 weeks with our team nurse and in 4-8 weeks with our team physician or nurse practitioner. Will need ACP discussion and follow up of COVID-19 booster status. Health Maintenance Due   Topic Date Due    Shingrix Vaccine Age 49> (1 of 2) Never done         Patient/family was provided a paper after visit summary prior to conclusion of visit. HISTORY:      CHIEF COMPLAINT:    Chief Complaint   Patient presents with    Dementia    Other     Wheelchair bound       HPI/SUBJECTIVE:    Patient is seen for primary care at home due to dementia and wheelchair bound status. History is per patient's daughters since patient cannot give detailed responses. Patient had breast cancer in 2014. She has a history of stroke. She uses Tylenol as needed for general stiffness. She follows with neurology for dementia and has an appointment on 4/29. She does experience constipation though daughter say she has a bowel movement about every other day. They say her diet consists mostly of sweets coffee because she does not seem interested in eating other things. They say she has lost weight.   She goes to bed at about 7 PM and wakes at about 7 AM.  Equipment in the home includes hospital bed, bedside commode, Ashley lift. They do not use the Kyle Bunde lift. Patient can answer basic yes/no questions and tell me that she is doing fine but is unable to give me details about her medical history. COVID-19 vaccination card:      Social history:  Pt lives at home with her 2 daughters Carlyle Sumner and Patrick Perez) and son Delaney Mota.). REVIEW OF SYSTEMS:     Review of Systems   Constitutional: Positive for weight loss. Negative for chills and fever. HENT: Negative for congestion and sore throat. Eyes: Negative for pain, discharge and redness. Respiratory: Negative for cough, shortness of breath and wheezing. Cardiovascular: Negative for chest pain and leg swelling. Gastrointestinal: Positive for constipation. Negative for abdominal pain, diarrhea, nausea and vomiting. Genitourinary: Negative for dysuria and frequency. Musculoskeletal: Negative for falls and neck pain. Skin: Negative for itching and rash. Neurological: Negative for dizziness, weakness and headaches. Psychiatric/Behavioral: Negative for depression and substance abuse. The patient is not nervous/anxious. PHYSICAL EXAM:     Visit Vitals  /64 (BP 1 Location: Left upper arm, BP Patient Position: Sitting)   Pulse 78   Temp 97.7 °F (36.5 °C) (Temporal)   SpO2 98%       Physical Exam  Constitutional:       General: She is not in acute distress. Appearance: She is not ill-appearing or toxic-appearing. Comments: She appears frail. HENT:      Head: Normocephalic and atraumatic. Right Ear: External ear normal.      Left Ear: External ear normal.      Nose: Nose normal. No rhinorrhea. Mouth/Throat:      Mouth: Mucous membranes are moist.      Pharynx: Oropharynx is clear. Eyes:      General:         Right eye: No discharge. Left eye: No discharge. Extraocular Movements: Extraocular movements intact.       Conjunctiva/sclera: Conjunctivae normal.   Cardiovascular: Rate and Rhythm: Normal rate. Pulses: Normal pulses. Pulmonary:      Effort: Pulmonary effort is normal. No respiratory distress. Breath sounds: Normal breath sounds. No wheezing. Abdominal:      General: Bowel sounds are normal. There is no distension. Palpations: Abdomen is soft. Tenderness: There is no abdominal tenderness. There is no guarding. Musculoskeletal:         General: No swelling or deformity. Cervical back: Normal range of motion. No rigidity. Skin:     General: Skin is warm and dry. Capillary Refill: Capillary refill takes less than 2 seconds. Neurological:      Mental Status: She is alert. Comments: She is oriented at least to person and place. She is able to answer some basic yes/no questions. She is not able to give detailed responses. She repeats that she is doing well and has no problems. Psychiatric:         Mood and Affect: Mood normal.      Comments: No restlessness or agitation.           HISTORY:     Past Medical and Surgical History reviewed in [facility] on date of initial visit    Patient Active Problem List   Diagnosis Code    Venous thrombosis I82.90    Frequent urination R35.0    Cramps, extremity R25.2    Overactive bladder N32.81    Hyperlipidemia E78.5    Microscopic hematuria R31.29    Essential hypertension I10    Subclinical hyperthyroidism E05.90    Bilateral chronic knee pain M25.561, M25.562, G89.29    Major neurocognitive disorder due to probable Alzheimer's disease, without behavioral disturbance (HCC) F03.90    History of CVA (cerebrovascular accident) Z80.78    COVID-19 vaccine series completed Z92.29    Constipation K59.00    History of breast cancer Z85.3    Anemia D64.9     Family History   Problem Relation Age of Onset    Stroke Mother     Stroke Father     Diabetes Daughter       Social History     Tobacco Use    Smoking status: Former Smoker    Smokeless tobacco: Never Used    Tobacco comment: smoked for 10 yrs   Substance Use Topics    Alcohol use: Yes     Alcohol/week: 0.8 standard drinks     Types: 1 Cans of beer per week     Comment: occ     No Known Allergies   Current Outpatient Medications   Medication Sig    Vitamin D3 25 mcg (1,000 unit) tablet TAKE 2 TABLETS BY MOUTH EVERY DAY    diclofenac (VOLTAREN) 1 % gel APPLY 2 G TO AFFECTED AREA EVERY SIX (6) HOURS.  Arthritis Pain Relief 650 mg TbER TAKE 1 TABLET BY MOUTH 3 TIMES DAILY AS NEEDED FOR PAIN ASSOCIATED WITH ARTHRITIS    simvastatin (ZOCOR) 20 mg tablet Take 1 Tablet by mouth nightly.  lisinopriL (PRINIVIL, ZESTRIL) 20 mg tablet TAKE 1 TABLET BY MOUTH EVERY DAY    amLODIPine (NORVASC) 5 mg tablet Take 1 Tablet by mouth daily.  metoprolol succinate (TOPROL-XL) 50 mg XL tablet TAKE 1 TABLET BY MOUTH EVERY DAY    methIMAzole (TAPAZOLE) 5 mg tablet Take 1 Tablet by mouth daily.  aspirin delayed-release 81 mg tablet Take 1 Tab by mouth daily.  traZODone (DESYREL) 50 mg tablet TAKE 1 TABLET BY MOUTH NIGHTLY FOR SLEEP    donepeziL (ARICEPT) 10 mg tablet TAKE 1 TABLET BY MOUTH EVERY DAY AT NIGHT    loratadine (Claritin) 10 mg tablet Take 1 Tablet by mouth daily.  linaCLOtide (Linzess) 145 mcg cap capsule Take 1 Capsule by mouth Daily (before breakfast). With WATER only, 30 minutes before breakfast.    anastrozole (ARIMIDEX) 1 mg tablet 1 mg = 1 tab each dose, PO, daily, # 90 tab, 2 Refills, Pharmacy: [facility]acy #[id number]   multivitamin (ONE A DAY) tablet Take 1 Tab by mouth daily. Indications: Treatment To Prevent Vitamin Deficiency (Patient not taking: Reported on 3/7/2022)     No current facility-administered medications for this visit.         LAB DATA REVIEWED:     Lab Results   Component Value Date/Time    Hemoglobin A1c 5.3 03/07/2022 11:30 AM    Hemoglobin A1c (POC) 5.9 05/11/2011 10:01 AM     No results found for: MCACR, MCA1, MCA2, MCA3, MCAU, MCAU2, MCALPOCT  Lab Results   Component Value Date/Time    TSH 0.58 09/09/2021 02:53 PM     Lab Results   Component Value Date/Time    VITAMIN D, 25-HYDROXY 14.4 (L) 01/23/2015 12:00 AM         Lab Results   Component Value Date/Time    WBC 7.3 03/07/2022 11:30 AM    HGB 8.7 (L) 03/07/2022 11:30 AM    PLATELET 960 13/05/8878 11:30 AM     Lab Results   Component Value Date/Time    Sodium 138 03/07/2022 11:30 AM    Potassium 3.4 (L) 03/07/2022 11:30 AM    Chloride 102 03/07/2022 11:30 AM    CO2 31 03/07/2022 11:30 AM    BUN 9 03/07/2022 11:30 AM    Creatinine 0.73 03/07/2022 11:30 AM    Calcium 8.8 03/07/2022 11:30 AM    Magnesium 2.0 06/17/2019 01:04 PM      Lab Results   Component Value Date/Time    Alk.  phosphatase 74 09/09/2021 02:53 PM    Protein, total 7.6 09/09/2021 02:53 PM    Albumin 3.6 09/09/2021 02:53 PM    Globulin 4.0 09/09/2021 02:53 PM     Lab Results   Component Value Date/Time    Iron 70 03/07/2022 11:30 AM    TIBC 204 (L) 03/07/2022 11:30 AM    Iron % saturation 34 03/07/2022 11:30 AM                Isauro John MD

## 2022-03-09 NOTE — TELEPHONE ENCOUNTER
I called the patient to confirm an appt and spoke with Larry Ibarra. She asked when the patients lab results would be in.

## 2022-03-10 PROBLEM — D64.9 ANEMIA: Status: ACTIVE | Noted: 2022-01-01

## 2022-03-10 NOTE — TELEPHONE ENCOUNTER
Call returned to [facility] to schedule appointment on 3/22 for admission follow up and labs, basic lab results discussed - labs stable at this time, and no medication changes were recommended by Dr. Jeffrey Vergara.

## 2022-03-11 NOTE — TELEPHONE ENCOUNTER
Patient's daughter, Rody Speaker called to ask about patient's low Hgb and if she should get OTC Iron supplement. I explained that MD/NP have not recommended iron at this time. I have her on my schedule for 3/22 for repeat labs and if this continues to be an issue, then MD or NP would place orders to address this issue. I also explained adverse effects of iron (constipation/GI upset) as reasons for not taking the supplement if not needed. Lenin De La Rosa verbalized understanding. Jair Shah NP was notified.

## 2022-03-23 NOTE — TELEPHONE ENCOUNTER
Call placed to Lenin De La Rosa, patient's daughter to inform of Kiana Shepherd NP request [facility] inform patient/caregiver that labs are stable. We will discuss further at my visit next month\".

## 2022-03-23 NOTE — PROGRESS NOTES
Please inform patient/caregiver that labs are stable. We will discuss further at my visit next month. Thanks!   Dylon Lennon NP

## 2022-03-23 NOTE — TELEPHONE ENCOUNTER
----- Message from Angelina Castelan NP sent at 3/23/2022 12:47 PM EDT -----  Please inform patient/caregiver that labs are stable. We will discuss further at my visit next month. Thanks!   Angelina Castelna NP

## 2022-03-28 NOTE — TELEPHONE ENCOUNTER
Tin Castañeda is calling to see what time the MD/NP is coming to see patient. States she received a call on Friday that moved the time to the first appt and needs to know. Advised Tin Castañeda what I saw on schedule however advised her would have PSR for [facility] call her back to discuss time.

## 2022-03-28 NOTE — PATIENT INSTRUCTIONS
Helping a Person With Alzheimer's Disease: Care Instructions  Your Care Instructions     Alzheimer's disease is a type of dementia. It affects memory, intelligence, judgment, language, and behavior. It is not clear what causes this disease. But it is the most common form of dementia in older adults. It may take many years to develop. Alzheimer's disease is different than mild memory loss that occurs with aging. Family members usually notice symptoms first. But the person also may realize that something is wrong. Follow-up care is a key part of your loved one's treatment and safety. Be sure to make and go to all appointments, and call your doctor if your loved one is having problems. It's also a good idea to know your loved one's test results and keep a list of the medicines he or she takes. How can you care for your loved one at home? · Develop a routine. The person will feel less frustrated or confused with a clear, simple daily plan. Remind him or her about important facts and events. · Be patient. It may take longer for the person to complete a task than it used to. · Help the person eat a balanced diet. Serve plenty of whole grains, fruits, and vegetables every day. If the person is not eating well at mealtimes, give snacks at midmorning and in the afternoon. Offer drinks such as Boost, Ensure, or Sustacal if he or she is losing weight. · Encourage exercise. Walking and other activity may slow the decline of mental ability. Help the person keep an active mind. Encourage hobbies such as reading and crossword puzzles. · Take steps to help if the person is sundowning. This is the restless behavior and trouble with sleeping that may occur in late afternoon and at night. Try not to let the person nap during the day. Offer a glass of warm milk or caffeine-free tea before bedtime. · Ask family members and friends for help. You may need breaks where others can help care for the person.   · Talk to the person's doctor about what resources are available for help in your area. · Review all of the person's medicines with his or her doctor. · For as long as the person is able, allow him or her to make decisions about activities, food, clothing, and other choices. Let the person be independent, even if tasks take more time or are not done perfectly. Tailor tasks to the person's abilities. For example, if cooking is no longer safe, ask for other help. He or she can help set the table or make simple dishes such as a salad. When the person needs help, offer it gently. Keeping safe  · Make your home (or the person's home) safe. Tack down rugs, and put no-slip tape in the tub. Install handrails, and put safety switches on stoves and appliances. Keep rooms free of clutter. Make sure walkways around furniture are clear. Do not move furniture around, because the person may become confused. · Use locks on doors and cupboards. Lock up knives, scissors, medicines, cleaning supplies, and other dangerous things. · Do not let the person drive or cook if he or she cannot do it safely. A person with Alzheimer's should not drive unless he or she is able to pass an on-road driving test. Your state 's license bureau can do a driving test if there is any question. · Get medical alert jewelry for the person so you can be contacted if he or she wanders away. If possible, provide a safe place for wandering, such as an enclosed yard or garden. When should you call for help? Call 911 anytime you think you may need emergency care. For example, call if:    · A person who has Alzheimer's disease has disappeared.     · A person who has Alzheimer's disease is seriously injured. Call your doctor now or seek immediate medical care if:    · The person you are caring for suddenly sees or hears things that are not there (hallucinates).     · The person you are caring for has a sudden, drastic change in his or her behavior.    Watch closely for changes in your loved one's health, and be sure to contact the doctor if:    · A person who has Alzheimer's disease gradually gets worse or has symptoms that could cause injury.     · You need help caring for a person with Alzheimer's disease.     · The person has problems with his or her medicine. Where can you learn more? Go to http://www.gray.com/  Enter F868 in the search box to learn more about \"Helping a Person With Alzheimer's Disease: Care Instructions. \"  Current as of: June 16, 2021               Content Version: 13.2  © 6981-0823 [employer]. Care instructions adapted under license by [employer] (which disclaims liability or warranty for this information). If you have questions about a medical condition or this instruction, always ask your healthcare professional. [address] any warranty or liability for your use of this information.

## 2022-03-28 NOTE — PROGRESS NOTES
[facility]    [id number]      NOTE: Home [facility] is a PROVIDER (MD/NP) based interdisciplinary practice (RN, LCSW, Pharmacy, Dietician) for patients who cannot access (or have a taxing effort) primary / speciality medical care in an office setting. [facility] is NOT [employer] but works with [facility]. when there is a skilled need. Our MD/NPs are integral in Care Transitions; PLEASE CALL [phone] if this patient arrives in the Emergency Department or Hospital.        Date of Current Visit: 22  Patient/Family present for Current Visit: patient, daughters [address] of Care as stated by the patient/family is: to keep patient at home as long as possible     Preferences for hospitalization if that were to be necessary:  [] Patient DOES NOT WANT hospitalization; focus all treatments at home  [x] Patient WOULD WANT hospitalization for potentially reversible causes  Patient prefers hospitalization at: [address] (: 1930) is a 80 y.o. female, established patient, here for evaluation of the following chief complaint(s):  Dementia, Follow Up Chronic Condition, and Hypertension       ASSESSMENT/PLAN:  Below is the assessment and plan developed based on review of pertinent history, physical exam, labs, studies, and medications. 1. Major neurocognitive disorder due to probable Alzheimer's disease, without behavioral disturbance ([facility] [address]  2. Essential hypertension  3. Anemia, unspecified type  4. Subclinical hyperthyroidism  5. History of CVA (cerebrovascular accident)  6. History of breast cancer  7. Constipation, unspecified constipation type  8. COVID-19 vaccine series completed  9. Advance care planning  10. Do not resuscitate  -     DO NOT RESUSCITATE    Dementia - Currently followed by Dr. Brittany Aguilar, neurology, with next appointment in 2022. Taking aricept 10mg daily and trazodone 50mg nightly.  Eating and sleeping well; encouraged family to allow patient to eat what she wants, within reason. Discussed safety precautions in a patient with dementia, as the patient was seen to be fiddling with oven knobs, rummaging in drawers, etc. Will ask team RN to return in a few weeks to further discuss safety precautions in the home. I also discussed with the daughters that they may not be able to provide all of their mother's care in her home forever, given their own limitations/situations. Encouraged them to consider further options, prior to when they may be necessary. Discussed progressive nature of dementia and what to expect as the disease progresses. Hypertension/S/P Stroke - BP excellent today. Continue lisinopril 20mg daily, amlodipine 5mg daily, toprol-XL 50mg daily. On ASA 81mg and simvastatin 20mg daily for secondary stroke prevention; may need to consider ASA discontinuation due to anemia. Will monitor. Anemia - Recent labs significant for Hgb of 9.1, up from 8.7, but down from Hgb of 11.1 in 2018. Discussed work-up options, but at this time, I think these would be challenging to obtain and likely not impact care. Daughters are in agreement. No bryant blood or other overt symptoms of anemia; will plan to monitor and adjust treatment as needed. Normal X09, ferritin, folic acid, and near-normal iron profile in February/March 2022. Subclinical hyperthyroidism - Has been on methimazole 5mg daily for several years, with most recent TSH normal at 0.58 in Sept 2021. TSH was not ordered at initial [facility] visit, but will ask team RN to return in several weeks to recheck CBC, TSH, and FT4 and consider adjustment/discontinuation of tapazole as appropriate. Hx of breast cancer - On anastrozole 1mg daily. Reportedly follows with Dr. Marc Dutta. Advanced Care Planning - No ACP on file. POST form completed today with daughters (2 of 3 surviving children); they elect DDNR and comfort measures.  POST form uploaded to media today, DNR order placed. Health care agent activated. Covid-19 vaccination - Has completed primary series. Encouraged daughters to call Health Dept for home booster dose. Will plan follow up in approximately 2 months. Return in 8 weeks (on 2022) for 2 mo f/u. SUBJECTIVE/OBJECTIVE:  HPI    Patient is seen today in her home due to taxing effort to seek primary care services in the community. She is an established patient with the  [facility] program, but new to me as a provider. The patient lives in her home with her daughters, Lizzy Walls and Mau Mendiola, and son, Nick Mccormick. She had two other children who had previously . There is no Advanced Directive, but the patient's daughter Lizzy Walls signs for her, typically, but the siblings have agreed on her care thus far. Her daughters are present at the time of the visit and provide most of the history, as the patient has advanced dementia. She is pleasant and cooperative, however, and able to engage in conversation when asked direct questions. Her daughter, Mau Mendiola, has her own home but has temporarily moved into her mother's home to help care for her as both Lizzy Walls and Nick have significant health issues themselves. The family is concerned about what they will do when they are no longer able to care for their mother, but they are afraid that she will not want to go into a nursing facility as she adamantly wants to remain at home. However, their own health concerns prevent them from being able to watch her , and she \"gets into a lot of trouble\", most recently putting something from the freezer into the microwave for several minutes and causing the whole home to get smoky. Ms. Alicia William has dementia and is followed by Dr. Meño Dye, neurology; she has an appointment to see him in 2022. She takes aricept 10mg daily, and her daughters deny behaviors, though she is starting to get angry/\"belligerent\" when they ask her to take her medications. She also takes trazodone and sleeps well. She is incontinent of urine but was previously able to get to the bedside commode for bowel movements, though now she is more frequently incontinent of stool as well. She takes linzess for chronic constipation, which is working well. She has a history of breast cancer, and is reportedly followed by Dr. Francisca Munoz. She takes anastrozole 1mg daily. She has a history of stroke, and has been wheelchair-bound since that time. She has no residual deficits however, and is able to self-propel around her home. She takes amlodipine 5mg daily, toprol 50mg daily, and lisinopril 20mg daily for her blood pressure, along with 81mg ASA and simvastatin for secondary prevention. She is still able to transfer herself from her wheelchair to the hospital bed, but occasionally will mis- the distance and slide to the floor; this happened most recently about 2 weeks ago, and the patient's son had to come from work to get her up off the floor. She is also on tapazole 5mg daily for subclinical hyperthyroidism, going back to 2015. Most recently TSH in September 2021 was normal.         Visit Vitals  /68 (BP 1 Location: Left upper arm, BP Patient Position: Sitting, BP Cuff Size: Adult)   Pulse 82   Temp (!) 95.7 °F (35.4 °C) (Temporal)   Resp 20     Current Outpatient Medications on File Prior to Visit   Medication Sig Dispense Refill    traZODone (DESYREL) 50 mg tablet TAKE 1 TABLET BY MOUTH NIGHTLY FOR SLEEP 30 Tablet 8    donepeziL (ARICEPT) 10 mg tablet TAKE 1 TABLET BY MOUTH EVERY DAY AT NIGHT 30 Tablet 8    loratadine (Claritin) 10 mg tablet Take 1 Tablet by mouth daily. 90 Tablet 1    Vitamin D3 25 mcg (1,000 unit) tablet TAKE 2 TABLETS BY MOUTH EVERY DAY 60 Tablet 11    diclofenac (VOLTAREN) 1 % gel APPLY 2 G TO AFFECTED AREA EVERY SIX (6) HOURS.  100 g 2    Arthritis Pain Relief 650 mg TbER TAKE 1 TABLET BY MOUTH 3 TIMES DAILY AS NEEDED FOR PAIN ASSOCIATED WITH ARTHRITIS 90 Tablet 2    simvastatin (ZOCOR) 20 mg tablet Take 1 Tablet by mouth nightly. 90 Tablet 3    lisinopriL (PRINIVIL, ZESTRIL) 20 mg tablet TAKE 1 TABLET BY MOUTH EVERY DAY 30 Tablet 5    amLODIPine (NORVASC) 5 mg tablet Take 1 Tablet by mouth daily. 90 Tablet 3    metoprolol succinate (TOPROL-XL) 50 mg XL tablet TAKE 1 TABLET BY MOUTH EVERY DAY 30 Tablet 5    methIMAzole (TAPAZOLE) 5 mg tablet Take 1 Tablet by mouth daily. 30 Tablet 11    linaCLOtide (Linzess) 145 mcg cap capsule Take 1 Capsule by mouth Daily (before breakfast). With WATER only, 30 minutes before breakfast. 30 Capsule 11    anastrozole (ARIMIDEX) 1 mg tablet 1 mg = 1 tab each dose, PO, daily, # 90 tab, 2 Refills, Pharmacy: [facility]/pharmacy #[id number]     multivitamin (ONE A DAY) tablet Take 1 Tab by mouth daily. Indications: Treatment To Prevent Vitamin Deficiency 90 Tab 3    aspirin delayed-release 81 mg tablet Take 1 Tab by mouth daily. 90 Tab 3     No current facility-administered medications on file prior to visit.        Review of Systems -- obtained from daughters  [address]   Constitutional: denies activity change, appetite change, fatigue, fever  HEENT: denies congestion, sinus pressure, sore throat  CV: denies chest pain, palpitations, edema  Respiratory: denies shortness of breath, wheezing  GI: denies abdominal pain, blood in stool, diarrhea, constipation  MSK: denies arthralgias, joint swelling  Neuro: denies frequent headaches, dizziness, numbness/tingling  Skin: denies skin breakdown  Psych: denies depression, anxiety, confusion,endorses chronic memory changes        Physical Exam  Constitutional: no acute distress, thin, poorly groomed AAF in wheelchair, self-propels with manual wheelchair  HEENT: normocephalic, atraumatic, external ears normal bilaterally; moist mucous membranes; EOM intact  CV: regular rate and rhythm, no murmurs noted  Pulm: normal effort, normal breath sounds without wheezing or rhonchi  Abd: normal bowel sounds, soft, non-tender  : small external hemorrhoid, no bleeding, vitiligo of perineal area  Skin: warm, dry; no breakdown noted  Neuro: A & O  X 2; pleasantly confused  Psych: calm        Advanced Care Planning    Primary Decision Maker (Active): Luis E Castro - Child - [phone]    No flowsheet data found. On this date 03/28/2022 I have spent 60 minutes reviewing previous notes, test results and face to face with the patient discussing the diagnosis and importance of compliance with the treatment plan as well as documenting on the day of the visit. An electronic signature was used to authenticate this note.   -- Shawn Hernandez NP

## 2022-03-28 NOTE — PROGRESS NOTES
Advance Care Planning     General Advance Care Planning (ACP) Conversation      Date of Conversation: 3/28/2022  Conducted with: Healthcare Decision Maker: Next of Kin by law (only applies in absence of a Healthcare Power of  or Legal Guardian)    Healthcare Decision Maker:     Primary Decision Maker (Active): Edgar Candelario - [phone]  Click here to complete [address] including selection of the Healthcare Decision Maker Relationship (ie \"Primary\")      Today we documented legal decision maker consistent with next-of-kin. Patient's daughter, Chris Partida, signs for her, but daughter Shantell Roy is also present and is in agreement. Two of the three surviving children are present at today's visit. Content/Action Overview:    Has ACP document(s) on file - reflects the patient's care preferences  Reviewed DNR/DNI and patient elects DNR order - completed portable DNR form & placed order  Topics discussed: treatment goals, artificial nutrition, ventilation preferences, hospitalization preferences and resuscitation preferences  Additional Comments: POST form completed and uploaded; Comfort Measures only     Length of Voluntary ACP Conversation in minutes:  20 minutes    Dyan Daugherty NP

## 2022-03-30 NOTE — PROGRESS NOTES
Home Based Primary Care  Plan of Care    [facility] Team Members: Yumiko Oliver MD; Terris Hamman, DOROTA; Vaishnavi Matta NP, Ashleigh Lezama, RN; Emily Zelaya, RN; Zo Bruce, COOPER;  GABBIE Ding  1930 / [id number]  female    The physician has reviewed and discussed the plan of care with the interdisciplinary group on 22 and agrees. Date of Initial Visit (Start of Care): 3/7/2022    Diagnosis:   Patient Active Problem List   Diagnosis Code    Venous thrombosis I82.90    Frequent urination R35.0    Cramps, extremity R25.2    Overactive bladder N32.81    Hyperlipidemia E78.5    Microscopic hematuria R31.29    Essential hypertension I10    Subclinical hyperthyroidism E05.90    Bilateral chronic knee pain M25.561, M25.562, G89.29    Major neurocognitive disorder due to probable Alzheimer's disease, without behavioral disturbance (HCC) F03.90    History of CVA (cerebrovascular accident) Z80.78    COVID-19 vaccine series completed Z92.29    Constipation K59.00    History of breast cancer Z85.3    Anemia D64.9       Patient status summary: 80 y.o. female who was referred to the [address] [place] [address] program due to taxing effort to seek primary care services in the community, primarily due to advanced dementia. Patient discussed today for initial POC review. Advance Care Planning:  Code status: DNR      Primary Decision Maker (Active): Lylavinod Dumas [facility] - [phone]   No flowsheet data found.     DME/Supplies:  Bedside commode, Hospital Bed and Wheelchair (non-motorized)     No Known Allergies    Nutritional Requirements:   Oral with supported meal preparation    Functional/Activity Level:  Primarily utilizes wheelchair or is bedbound    Safety Measures:   Caregiver deficit, Fall risk, Fire safety deficit and Self-care deficity    Acuity Level Ratin - Medium    Current Outpatient Medications   Medication Sig    traZODone (DESYREL) 50 mg tablet TAKE 1 TABLET BY MOUTH NIGHTLY FOR SLEEP  donepeziL (ARICEPT) 10 mg tablet TAKE 1 TABLET BY MOUTH EVERY DAY AT NIGHT    loratadine (Claritin) 10 mg tablet Take 1 Tablet by mouth daily.  Vitamin D3 25 mcg (1,000 unit) tablet TAKE 2 TABLETS BY MOUTH EVERY DAY    diclofenac (VOLTAREN) 1 % gel APPLY 2 G TO AFFECTED AREA EVERY SIX (6) HOURS.  Arthritis Pain Relief 650 mg TbER TAKE 1 TABLET BY MOUTH 3 TIMES DAILY AS NEEDED FOR PAIN ASSOCIATED WITH ARTHRITIS    simvastatin (ZOCOR) 20 mg tablet Take 1 Tablet by mouth nightly.  lisinopriL (PRINIVIL, ZESTRIL) 20 mg tablet TAKE 1 TABLET BY MOUTH EVERY DAY    amLODIPine (NORVASC) 5 mg tablet Take 1 Tablet by mouth daily.  metoprolol succinate (TOPROL-XL) 50 mg XL tablet TAKE 1 TABLET BY MOUTH EVERY DAY    methIMAzole (TAPAZOLE) 5 mg tablet Take 1 Tablet by mouth daily.  linaCLOtide (Linzess) 145 mcg cap capsule Take 1 Capsule by mouth Daily (before breakfast). With WATER only, 30 minutes before breakfast.    anastrozole (ARIMIDEX) 1 mg tablet 1 mg = 1 tab each dose, PO, daily, # 90 tab, 2 Refills, Pharmacy: [facility]/pharmacy #[id number]   multivitamin (ONE A DAY) tablet Take 1 Tab by mouth daily. Indications: Treatment To Prevent Vitamin Deficiency    aspirin delayed-release 81 mg tablet Take 1 Tab by mouth daily. No current facility-administered medications for this visit. The Plan of Care has been initiated for within 15 days of New Visit  and reviewed and updated by the Interdisciplinary Group (IDG) as frequently as the patient condition warrants. Plan of Care by Discipline:    1.  Provider  Identify patient's health care goal(s), Ongoing evaluation of treatment interventions for specific disease state, Reduction of polypharmacy within benefit/burden framework appropriate to age, function and disease state, Determine need for laboratory assessment based on patient disease status , Assess results of laboratory testing and adjust treatment plan as appropriate, Assess current Advance Care Planning documents and make ACP recommendations as appropriate and Discuss goals of care and treatment preferences, including resuscitation    2. Nursing  Education for safety; falls, Education for safety; fire safety, Education on plan for medical interventions in emergency / at time of crisis, Provider caregiver / family support for patient's current and changing condition and Discuss goals of care and treatment preferences, including resuscitation    3. Social Work  New patient assessment of psychosocial needs and social determinants of health, Review Emergency Preparedness Plan, Assess safety concerns and address as appropriate, Assess for caregiver burden and intervene as psychosocially indicated, Assess patient for caregiver needs to optimize psychosocial function and safety and Provide information on available community resources      Plan of Care Orders / Action Items:    Dementia - Currently followed by Dr. Licha Ramirez, neurology, with next appointment in April 2022. Taking aricept 10mg daily and trazodone 50mg nightly. Eating and sleeping well; encouraged family to allow patient to eat what she wants, within reason. Discussed safety precautions in a patient with dementia, as the patient was seen to be fiddling with oven knobs, rummaging in drawers, etc. Will ask team RN to return in a few weeks to further discuss safety precautions in the home. I also discussed with the daughters that they may not be able to provide all of their mother's care in her home forever, given their own limitations/situations. Encouraged them to consider further options, prior to when they may be necessary. Discussed progressive nature of dementia and what to expect as the disease progresses. Hypertension/S/P Stroke - BP excellent today. Continue lisinopril 20mg daily, amlodipine 5mg daily, toprol-XL 50mg daily.  On ASA 81mg and simvastatin 20mg daily for secondary stroke prevention; may need to consider ASA discontinuation due to anemia. Will monitor. Anemia - Recent labs significant for Hgb of 9.1, up from 8.7, but down from Hgb of 11.1 in 2018. Discussed work-up options, but at this time, I think these would be challenging to obtain and likely not impact care. Daughters are in agreement. No bryant blood or other overt symptoms of anemia; will plan to monitor and adjust treatment as needed. Normal R90, ferritin, folic acid, and near-normal iron profile in February/March 2022. Subclinical hyperthyroidism - Has been on methimazole 5mg daily for several years, with most recent TSH normal at 0.58 in Sept 2021. TSH was not ordered at initial [address] visit, but will ask team RN to return in several weeks to recheck CBC, TSH, and FT4 and consider adjustment/discontinuation of tapazole as appropriate. Hx of breast cancer - On anastrozole 1mg daily. Reportedly follows with Dr. Rudy Chacon. Advanced Care Planning - No ACP on file. POST form completed today with daughters (2 of 3 surviving children); they elect DDNR and comfort measures. POST form uploaded to media today, DNR order placed. Health care agent activated. Covid-19 vaccination - Has completed primary series. Encouraged daughters to call Health Dept for home booster dose.     Health Maintenance   Topic Date Due    Shingrix Vaccine Age 49> (1 of 2) Never done    Flu Vaccine (1) 06/30/2022 (Originally 9/1/2021)    COVID-19 Vaccine (3 - Booster for Pfizer series) 09/09/2022 (Originally 7/25/2021)    Pneumococcal 65+ years (1 of 1 - PPSV23) 09/09/2022 (Originally 7/2/1995)    Lipid Screen  09/09/2022    Medicare Yearly Exam  09/10/2022    Depression Screen  03/28/2023    DTaP/Tdap/Td series (2 - Td or Tdap) 09/16/2026    Bone Densitometry (Dexa) Screening  Completed         Estimated Visit Frequency:  Every 2 month Provider Visit  Last MD visit:   SW visits PRN

## 2022-04-07 NOTE — TELEPHONE ENCOUNTER
LCSW called pt's dtr for 1 month routine follow-up post admission to [facility] program. Ck Piña answered the phone, stated that it was not a good time to talk. LCSW offered to call back tomorrow. She stated that a call tomorrow would be better.      MARGO Blancas, [place]    [address]  (n) [phone]

## 2022-04-20 NOTE — TELEPHONE ENCOUNTER
Call to patient's daughter, Rickey Jose to schedule appointment for labs and safety education on Friday, 4/22 at noon. Gertrudis verbalizes agreement.

## 2022-04-22 NOTE — PROGRESS NOTES
Purpose of SN visit: Follow Up + Labs Follow Up Patient Problem    Complaints / Concerns Shared: Safety concerns for patients with dementia. Safety education provided to family members, Mavis Esqueda and Caryle Clubs to include fire precautions - such as removing knobs out of the stove, adding alarms to the door (family denies patient wanders), fall precautions discussed. Handout from [employer] with education provided to family, I also encouraged them to look up the Association's web page, as it provides several resources and education for caring for family members with memory loss. Gertrudis verbalizes understanding. Patient is alert, denies any pain or discomfort, follows commands. Vital Signs    /90  HR 62  PO2 100%  Temp 99F    Venipuncture x1, sample obtained from L AC. Patient tolerated well.

## 2022-04-25 NOTE — PROGRESS NOTES
Please ask family to reduce dose of methimazole (tapazole) to 2.5mg (1/2 tab) daily for abnormal thyroid function testing; we will recheck in about 4 - 6 weeks. Hgb lower than at last check; please give ED precautions for bleeding, but this appears to be chronic.

## 2022-04-25 NOTE — PROGRESS NOTES
Call placed to patient's daughter to review SYDNEY Marte's order to reduce Methimazole (Tapazole) to 2.5mg daily. I also discussed Hgb level and bleeding precautions/education, I did educate daughter that this appears to be a chronic issue and no intervention is necessary at this time. Labs to be rechecked in 4-6 weeks.

## 2022-04-29 PROBLEM — F02.818: Status: ACTIVE | Noted: 2022-01-01

## 2022-04-29 PROBLEM — G30.8: Status: ACTIVE | Noted: 2022-01-01

## 2022-04-29 NOTE — PROGRESS NOTES
Chief Complaint   Patient presents with    Follow-up     Alzheimer's disease, here with daughter who states Cely High is becoming more aggitated and will not take her medicines. \"     Visit Vitals  BP (!) 148/88 (BP 1 Location: Left upper arm, BP Patient Position: Sitting)   Pulse 68   Ht 5' 1\" (1.549 m)   Wt 115 lb (52.2 kg)   SpO2 97%   BMI 21.73 kg/m²

## 2022-04-29 NOTE — PROGRESS NOTES
Chief Complaint   Patient presents with    Follow-up     Alzheimer's disease, here with daughter who states Efrem Keys is becoming more aggitated and will not take her medicines. \"       HPI        Ms. Kari Peoples is a 72-year-old woman following up for dementia mixed Alzheimer's and frontal type. She has abnormal imaging and neuropsychological testing. She is here today with one of her daughters. She lives full-time with her other daughter and son-in-law. The daughter tells me that Ms. Kari Peoples is very argumentative and sometimes physically aggressive and needs to be redirected constantly. Not having any hallucinations. She refuses to take her medications a lot of the time. She has a tendency for putting sugar in all of her food and drink. Sometimes she hides candy. She needs full-time care and supervision for all ADLs to include bathing and dressing and eating. On rare occasion she has gotten up in the middle the night and did a little bit of roaming and on one occasion tried to turn the stove on. MRI imaging showing bilateral hippocampal temporal lobe atrophy consistent with the diagnosis. Background:  Ms. Kari Peoples is an 80-year-old woman with a history of hypertension, stroke here for memory changes. 1 of her daughters is present. Ms. Kari Peoples is a poor historian. She does not know why she is here. Family is concerned that over the past year and several months her memory is getting worse such that she cannot remember conversations. She gets lost easily after being given directions. She gets angry easily. She needs medication supervision because she forgets her medications. She lives with family. She does not drive. Ms. Kari Peoples denies any hallucinations. She has 1/9 grade education. She can do her own ADLs she tells me such as bathing, dressing, feeding. She spends most of the day sedentary but does walk with her walker sometimes. Recent blood work showing normal B12 and basic labs.   Head CT unrevealing. Review of Systems   Unable to perform ROS: Dementia   Musculoskeletal: Positive for back pain, joint pain, myalgias and neck pain. Psychiatric/Behavioral: Positive for memory loss. Past Medical History:   Diagnosis Date    Cancer [facility])     right breast.    Chronic pain     CVA     HTN (hypertension) 2/9/2011    Hypertension     Microscopic hematuria 5/11/2011    Other and unspecified hyperlipidemia 2/9/2011    Subclinical hyperthyroidism 8/29/2015    UTI (lower urinary tract infection) 8/25/2010    Venous thrombosis 2/17/2010    left arm DVT     Family History   Problem Relation Age of Onset    Stroke Mother     Stroke Father     Diabetes Daughter      Social History     Socioeconomic History    Marital status:      Spouse name: Not on file    Number of children: Not on file    Years of education: Not on file    Highest education level: Not on file   Occupational History    Not on file   Tobacco Use    Smoking status: Former Smoker    Smokeless tobacco: Never Used    Tobacco comment: smoked for 10 yrs   Vaping Use    Vaping Use: Never used   Substance and Sexual Activity    Alcohol use: Yes     Alcohol/week: 0.8 standard drinks     Types: 1 Cans of beer per week     Comment: occ    Drug use: No    Sexual activity: Not Currently   Other Topics Concern    Not on file   Social History Narrative    Not on file     Social Determinants of Health     Financial Resource Strain:     Difficulty of Paying Living Expenses: Not on file   Food Insecurity:     Worried About Running Out of Food in the Last Year: Not on file    Linnea of Food in the Last Year: Not on file   Transportation Needs:     Lack of Transportation (Medical): Not on file    Lack of Transportation (Non-Medical):  Not on file   Physical Activity:     Days of Exercise per Week: Not on file    Minutes of Exercise per Session: Not on file   Stress:     Feeling of Stress : Not on file   Social Connections:     Frequency of Communication with Friends and Family: Not on file    Frequency of Social Gatherings with Friends and Family: Not on file    Attends Taoism Services: Not on file    Active Member of Clubs or Organizations: Not on file    Attends Club or Organization Meetings: Not on file    Marital Status: Not on file   Intimate Partner Violence:     Fear of Current or Ex-Partner: Not on file    Emotionally Abused: Not on file    Physically Abused: Not on file    Sexually Abused: Not on file   Housing Stability:     Unable to Pay for Housing in the Last Year: Not on file    Number of [place] in the Last Year: Not on file    Unstable Housing in the Last Year: Not on file     No Known Allergies      Current Outpatient Medications   Medication Sig    traZODone (DESYREL) 50 mg tablet Take 1 Tablet by mouth nightly. For sleep, crushed in ice cream    donepeziL (ARICEPT) 10 mg tablet Take 1 Tablet by mouth nightly. Crushed in ice cream    simvastatin (ZOCOR) 20 mg tablet Take 1 Tablet by mouth nightly.  loratadine (Claritin) 10 mg tablet Take 1 Tablet by mouth daily.  Vitamin D3 25 mcg (1,000 unit) tablet TAKE 2 TABLETS BY MOUTH EVERY DAY    diclofenac (VOLTAREN) 1 % gel APPLY 2 G TO AFFECTED AREA EVERY SIX (6) HOURS.  Arthritis Pain Relief 650 mg TbER TAKE 1 TABLET BY MOUTH 3 TIMES DAILY AS NEEDED FOR PAIN ASSOCIATED WITH ARTHRITIS    lisinopriL (PRINIVIL, ZESTRIL) 20 mg tablet TAKE 1 TABLET BY MOUTH EVERY DAY    amLODIPine (NORVASC) 5 mg tablet Take 1 Tablet by mouth daily.  metoprolol succinate (TOPROL-XL) 50 mg XL tablet TAKE 1 TABLET BY MOUTH EVERY DAY    methIMAzole (TAPAZOLE) 5 mg tablet Take 1 Tablet by mouth daily.  linaCLOtide (Linzess) 145 mcg cap capsule Take 1 Capsule by mouth Daily (before breakfast).  With WATER only, 30 minutes before breakfast.    anastrozole (ARIMIDEX) 1 mg tablet 1 mg = 1 tab each dose, PO, daily, # 90 tab, 2 Refills, Pharmacy: [facility]/pharmacy #[id number]   multivitamin (ONE A DAY) tablet Take 1 Tab by mouth daily. Indications: Treatment To Prevent Vitamin Deficiency    aspirin delayed-release 81 mg tablet Take 1 Tab by mouth daily. No current facility-administered medications for this visit. Neurologic Exam     Mental Status   WD/WN adult in NAD, normal grooming  VSS  A&O x talkative and a little bit calm today. She is restless moving back and forth in her wheelchair. Asking me if I have a . PERRL, nonicteric  Face is symmetric, tongue midline  Speech is dysarthric-baseline  No limb ataxia. No abnl movements. Moving all extemities spontaneously and symmetric  Sitting in a wheelchair easily crossing one leg or the other  Deferred gait           Visit Vitals  BP (!) 148/88 (BP 1 Location: Left upper arm, BP Patient Position: Sitting)   Pulse 68   Ht 5' 1\" (1.549 m)   Wt 115 lb (52.2 kg)   SpO2 97%   BMI 21.73 kg/m²       Assessment and Plan   Diagnoses and all orders for this visit:    1. Major neurocognitive disorder due to probable Alzheimer's disease, without behavioral disturbance (HCC)  -     donepeziL (ARICEPT) 10 mg tablet; Take 1 Tablet by mouth nightly. Crushed in ice cream    2. Alzheimer's dementia of other onset with behavioral disturbance ([facility] [address]    3. Alzheimer disease ([facility] [address]  -     donepeziL (ARICEPT) 10 mg tablet; Take 1 Tablet by mouth nightly. Crushed in ice cream    Other orders  -     traZODone (DESYREL) 50 mg tablet; Take 1 Tablet by mouth nightly. For sleep, crushed in ice cream         80year-old woman with likely mixed Alzheimer's/frontal lobe dementia. Behavior is getting a little bit worse such that she is refusing medications and can be a little physically aggressive. Family is doing a good job redirecting her and keeping her safe and healthy. I asked that family crush her medications that I have prescribed and mixed with some ice cream or something sweet.   Perhaps she can be more consistent. Optimize sleep is much as possible. Keep her busy during the day with puzzles and activities. She is progressing slowly particularly given the behavioral changes and the tendency for sweet food which is not unusual for frontal type dementia. No change to the medications I have her on. I would like to see her annually or sooner if need be. 30minutes of time was spent reviewing the medical record today to include face-to-face time with the patient, speaking to the daughter in person , completion of documentation today. I reviewed and decided to continue the current medications. This clinical note was dictated with an electronic dictation software that can make unintentional errors. If there are any questions, please contact me directly for clarification.       [address] [facility], [facility] [address]  Diplomate ABPN

## 2022-05-10 NOTE — PROGRESS NOTES
Home Based Primary Care  Plan of Care    hospitals Team Members: Wily Steiner MD; Agustin Ortega NP; Selam Hidalgo NP, Gurjit Serra, RN; Carlito Santana, COOPER; Zo Bruce, COOPER;  Robel Schneider LCSW    Franklin Ahngriffin  1930 / [id number]  female    The physician has reviewed and discussed the plan of care with the interdisciplinary group on 22 and agrees. Date of Initial Visit (Start of Care): 3/7/2022    Diagnosis:   Patient Active Problem List   Diagnosis Code    Venous thrombosis I82.90    Frequent urination R35.0    Cramps, extremity R25.2    Overactive bladder N32.81    Hyperlipidemia E78.5    Microscopic hematuria R31.29    Essential hypertension I10    Subclinical hyperthyroidism E05.90    Bilateral chronic knee pain M25.561, M25.562, G89.29    Major neurocognitive disorder due to probable Alzheimer's disease, without behavioral disturbance (HCC) F03.90    History of CVA (cerebrovascular accident) Z80.78    COVID-19 vaccine series completed Z92.29    Constipation K59.00    History of breast cancer Z85.3    Anemia D64.9    Alzheimer's dementia of other onset with behavioral disturbance (HCC) G30.8, F02.81       Patient status summary: 80 y.o. female who was referred to the [address] [place] [address] due to taxing effort to seek primary care services in the community, primarily due to advanced dementia. Patient discussed today for initial POC review. Advance Care Planning:  Code status: DNR      Primary Decision Maker (Active): Madelyn Tracy - Child - [phone]   No flowsheet data found.     DME/Supplies:  Bedside commode, Hospital Bed and Wheelchair (non-motorized)     No Known Allergies    Nutritional Requirements:   Oral with supported meal preparation    Functional/Activity Level:  Primarily utilizes wheelchair or is bedbound    Safety Measures:   Caregiver deficit, Fall risk, Fire safety deficit and Self-care deficity    Acuity Level Ratin - Medium    Current Outpatient Medications Medication Sig    traZODone (DESYREL) 50 mg tablet Take 1 Tablet by mouth nightly. For sleep, crushed in ice cream    donepeziL (ARICEPT) 10 mg tablet Take 1 Tablet by mouth nightly. Crushed in ice cream    simvastatin (ZOCOR) 20 mg tablet Take 1 Tablet by mouth nightly.  loratadine (Claritin) 10 mg tablet Take 1 Tablet by mouth daily.  Vitamin D3 25 mcg (1,000 unit) tablet TAKE 2 TABLETS BY MOUTH EVERY DAY    diclofenac (VOLTAREN) 1 % gel APPLY 2 G TO AFFECTED AREA EVERY SIX (6) HOURS.  Arthritis Pain Relief 650 mg TbER TAKE 1 TABLET BY MOUTH 3 TIMES DAILY AS NEEDED FOR PAIN ASSOCIATED WITH ARTHRITIS    lisinopriL (PRINIVIL, ZESTRIL) 20 mg tablet TAKE 1 TABLET BY MOUTH EVERY DAY    amLODIPine (NORVASC) 5 mg tablet Take 1 Tablet by mouth daily.  metoprolol succinate (TOPROL-XL) 50 mg XL tablet TAKE 1 TABLET BY MOUTH EVERY DAY    methIMAzole (TAPAZOLE) 5 mg tablet Take 1 Tablet by mouth daily.  linaCLOtide (Linzess) 145 mcg cap capsule Take 1 Capsule by mouth Daily (before breakfast). With WATER only, 30 minutes before breakfast.    anastrozole (ARIMIDEX) 1 mg tablet 1 mg = 1 tab each dose, PO, daily, # 90 tab, 2 Refills, Pharmacy: [facility]/pharmacy #[id number]   multivitamin (ONE A DAY) tablet Take 1 Tab by mouth daily. Indications: Treatment To Prevent Vitamin Deficiency    aspirin delayed-release 81 mg tablet Take 1 Tab by mouth daily. No current facility-administered medications for this visit. The Plan of Care has been initiated for within 15 days of New Visit  and reviewed and updated by the Interdisciplinary Group (IDG) as frequently as the patient condition warrants. Plan of Care by Discipline:    1.  Provider  Identify patient's health care goal(s), Ongoing evaluation of treatment interventions for specific disease state, Reduction of polypharmacy within benefit/burden framework appropriate to age, function and disease state, Determine need for laboratory assessment based on patient disease status , Assess results of laboratory testing and adjust treatment plan as appropriate, Assess current Advance Care Planning documents and make ACP recommendations as appropriate and Discuss goals of care and treatment preferences, including resuscitation    2. Nursing  Education for safety; falls, Education for safety; fire safety, Education on plan for medical interventions in emergency / at time of crisis, Provider caregiver / family support for patient's current and changing condition and Discuss goals of care and treatment preferences, including resuscitation    3. Social Work  New patient assessment of psychosocial needs and social determinants of health, Review Emergency Preparedness Plan, Assess safety concerns and address as appropriate, Assess for caregiver burden and intervene as psychosocially indicated, Assess patient for caregiver needs to optimize psychosocial function and safety and Provide information on available community resources      Plan of Care Orders / Action Items:    Dementia - Currently followed by Dr. Nelli Parrish, neurology, with next appointment in April 2022. Taking aricept 10mg daily and trazodone 50mg nightly. Eating and sleeping well; encouraged family to allow patient to eat what she wants, within reason. Discussed safety precautions in a patient with dementia, as the patient was seen to be fiddling with oven knobs, rummaging in drawers, etc. Will ask team RN to return in a few weeks to further discuss safety precautions in the home. I also discussed with the daughters that they may not be able to provide all of their mother's care in her home forever, given their own limitations/situations. Encouraged them to consider further options, prior to when they may be necessary. Discussed progressive nature of dementia and what to expect as the disease progresses. Hypertension/S/P Stroke - BP excellent today.  Continue lisinopril 20mg daily, amlodipine 5mg daily, toprol-XL 50mg daily. On ASA 81mg and simvastatin 20mg daily for secondary stroke prevention; may need to consider ASA discontinuation due to anemia. Will monitor. Anemia - Recent labs significant for Hgb of 9.1, up from 8.7, but down from Hgb of 11.1 in 2018. Discussed work-up options, but at this time, I think these would be challenging to obtain and likely not impact care. Daughters are in agreement. No bryant blood or other overt symptoms of anemia; will plan to monitor and adjust treatment as needed. Normal Q71, ferritin, folic acid, and near-normal iron profile in February/March 2022. Subclinical hyperthyroidism - Has been on methimazole 5mg daily for several years, with most recent TSH normal at 0.58 in Sept 2021. TSH was not ordered at initial [address] visit, but will ask team RN to return in several weeks to recheck CBC, TSH, and FT4 and consider adjustment/discontinuation of tapazole as appropriate. Hx of breast cancer - On anastrozole 1mg daily. Reportedly follows with Dr. Gerald Fernández. Advanced Care Planning - No ACP on file. POST form completed today with daughters (2 of 3 surviving children); they elect DDNR and comfort measures. POST form uploaded to media today, DNR order placed. Health care agent activated. Covid-19 vaccination - Has completed primary series. Encouraged daughters to call Health Dept for home booster dose.     Health Maintenance   Topic Date Due    Shingrix Vaccine Age 49> (1 of 2) Never done    COVID-19 Vaccine (3 - Booster for [email or web address] series) 09/09/2022 (Originally 7/25/2021)    Pneumococcal 65+ years (1 - PCV) 09/09/2022 (Originally 7/2/1995)    Flu Vaccine (Season Ended) 09/01/2022    Lipid Screen  09/09/2022    Medicare Yearly Exam  09/10/2022    Depression Screen  03/28/2023    DTaP/Tdap/Td series (2 - Td or Tdap) 09/16/2026    Bone Densitometry (Dexa) Screening  Completed         Estimated Visit Frequency:  Every 2 month Provider Visit  Last NP visit: 3/28/22  SW visits PRN

## 2022-05-20 NOTE — TELEPHONE ENCOUNTER
[facility] to request visit with NP date change to Monday, 5/23 around 11am. Gertrudis verbalizes agreement.

## 2022-05-23 NOTE — TELEPHONE ENCOUNTER
Sohan Mac called to make the patient's co-pay today and in the conversation she said that the patient needs a new hospital bed. She asked if we could assist w/that issue.   # [phone]

## 2022-05-23 NOTE — PROGRESS NOTES
Home Based Primary Care [facility]    [phone]      NOTE: Home Based Primary Care is a PROVIDER (MD/NP) based interdisciplinary practice (RN, LCSW) for patients who cannot access (or have a taxing effort) primary / speciality medical care in an office setting. [facility] is NOT [employer] but works with [address] when there is a skilled need. Our MD/NPs are integral in Care Transitions; PLEASE CALL [phone] if this patient arrives in the Emergency Department or Hospital.        Date of Current Visit: 22  Patient/Family present for Current Visit: patient, daughters [address] of Care as stated by the patient/family is: to keep patient at home as long as possible     Preferences for hospitalization if that were to be necessary:  [] Patient DOES NOT WANT hospitalization; focus all treatments at home  [x] Patient WOULD WANT hospitalization for potentially reversible causes  Patient prefers hospitalization at: [address] (: 1930) is a 80 y.o. female, established patient, here for evaluation of the following chief complaint(s):  Follow Up Chronic Condition       ASSESSMENT/PLAN:  Below is the assessment and plan developed based on review of pertinent history, physical exam, labs, studies, and medications. 1. Major neurocognitive disorder due to probable Alzheimer's disease, without behavioral disturbance ([facility] [address]  -     [address] [facility]  2. History of CVA (cerebrovascular accident)  -     [address] [facility]  3. Essential hypertension  4. Anemia, unspecified type  -     HGB & HCT; Future  5. Constipation, unspecified constipation type  6. History of breast cancer  7. Difficulty transferring  -     [facility]  8. Subclinical hyperthyroidism  -     TSH 3RD GENERATION; Future  -     T4, FREE;  Future    Dementia - Currently followed by Dr. Nuria Greer, neurology, last visit in 2022 with annual follow up recommended; no medications changes were made at that time. Taking aricept 10mg daily and trazodone 50mg nightly. Eating and sleeping well; encouraged family to allow patient to eat what she wants, within reason. Discussed safety precautions in a patient with dementia. I also discussed with the daughters that they may not be able to provide all of their mother's care in her home forever, given their own limitations/situations. Encouraged them to consider further options, prior to when they may be necessary. Discussed progressive nature of dementia and what to expect as the disease progresses. She has also had some challenges with transferring; will refer to PT/OT for evaluation and for fall prevention training. Hospital bed broken; will ask our office-based team to coordinate ordering a new one for her. Hypertension/S/P Stroke - BP excellent today. Continue lisinopril 20mg daily, amlodipine 5mg daily, toprol-XL 50mg daily. On ASA 81mg and simvastatin 20mg daily for secondary stroke prevention; may need to consider ASA discontinuation due to anemia. Will monitor. Anemia - Hemoglobin appears to have stabilized. Discussed work-up options, but at this time, I think these would be challenging to obtain and likely not impact care. Daughters are in agreement. No bryant blood or other overt symptoms of anemia; will plan to monitor and adjust treatment as needed. Normal J39, ferritin, folic acid, and near-normal iron profile in February/March 2022. Recheck with upcoming labs in a few weeks. Subclinical hyperthyroidism - Dose of methimazole 5mg was recently adjusted. Will plan recheck of labs in a few weeks for monitoring. Hx of breast cancer - On anastrozole 1mg daily. Reportedly follows with Dr. Radha Solorzano. Covid-19 vaccination - Has completed primary series and reportedly recently received booster, but daughters cannot locate card. Will follow up on this at next visit.     Will plan follow up in approximately 2 months, with labs prior. Return in 7 weeks (on 2022) for 2 mo follow up. SUBJECTIVE/OBJECTIVE:  HPI    Patient is seen today in her home due to taxing effort to seek primary care services in the community due to dementia, hx of CVA with impaired mobility. The patient lives in her home with her daughters, Osvaldo Chester and Alejandro Head, and son, Natali King. She had two other children who had previously . There is no Advanced Directive, but the patient's daughter Osvaldo Chester signs for her, typically, but the siblings have agreed on her care thus far. Her daughters are present at the time of the visit and provide most of the history, as the patient has advanced dementia. She is pleasant and cooperative, however, and able to engage in conversation when asked direct questions. Her daughter, Alejandro Head, has her own home but has temporarily moved into her mother's home to help care for her as both Osvaldo Chester and Nick have significant health issues themselves. The family is concerned about what they will do when they are no longer able to care for their mother, but they are afraid that she will not want to go into a nursing facility as she adamantly wants to remain at home. However, their own health concerns prevent them from being able to watch her , and she \"gets into a lot of trouble\", most recently putting something from the freezer into the microwave for several minutes and causing the whole home to get smoky. They are recognizing that they will not be able to provide her care forever, but have not yet decided on a plan for when they are unable to provide this care. Ms. Leandra Ferguson has dementia and is followed by Dr. Britt Garcia, neurology. She takes aricept 10mg daily, and her daughters deny behaviors, though she is starting to get angry/\"belligerent\" when they ask her to take her medications. She also takes trazodone and sleeps well.  She is incontinent of urine but was previously able to get to the bedside commode for bowel movements, though now she is more frequently incontinent of stool as well. She takes linzess for chronic constipation, which is working well. She has a history of breast cancer, and is reportedly followed by Dr. Dana Mars. She takes anastrozole 1mg daily. She has a history of stroke, and has been wheelchair-bound since her stroke. She has no residual deficits however, and is able to self-propel around her home. She takes amlodipine 5mg daily, toprol 50mg daily, and lisinopril 20mg daily for her blood pressure, along with 81mg ASA and simvastatin for secondary prevention. She is still able to transfer herself from her wheelchair to the hospital bed,  But has been having more difficulty with this of late and has had several episodes where she mis-judged the distance and has had near-falls/slides to the ground. She is also on tapazole for subclinical hyperthyroidism, going back to 2015. Visit Vitals  /62 (BP 1 Location: Right upper arm, BP Patient Position: Sitting, BP Cuff Size: Adult)   Pulse (!) 59   Temp (!) 96.4 °F (35.8 °C) (Temporal)   Resp 18   SpO2 100%     Current Outpatient Medications on File Prior to Visit   Medication Sig Dispense Refill    acetaminophen (Arthritis Pain Relief) 650 mg TbER Take 1 Tablet by mouth every eight (8) hours as needed for Pain. 90 Tablet 5    metoprolol succinate (TOPROL-XL) 50 mg XL tablet Take 1 Tablet by mouth daily. 30 Tablet 5    traZODone (DESYREL) 50 mg tablet Take 1 Tablet by mouth nightly. For sleep, crushed in ice cream 90 Tablet 2    donepeziL (ARICEPT) 10 mg tablet Take 1 Tablet by mouth nightly. Crushed in ice cream 90 Tablet 3    simvastatin (ZOCOR) 20 mg tablet Take 1 Tablet by mouth nightly. 90 Tablet 1    loratadine (Claritin) 10 mg tablet Take 1 Tablet by mouth daily. 90 Tablet 1    Vitamin D3 25 mcg (1,000 unit) tablet TAKE 2 TABLETS BY MOUTH EVERY DAY 60 Tablet 11    diclofenac (VOLTAREN) 1 % gel APPLY 2 G TO AFFECTED AREA EVERY SIX (6) HOURS.  100 g 2    lisinopriL (PRINIVIL, ZESTRIL) 20 mg tablet TAKE 1 TABLET BY MOUTH EVERY DAY 30 Tablet 5    amLODIPine (NORVASC) 5 mg tablet Take 1 Tablet by mouth daily. 90 Tablet 3    methIMAzole (TAPAZOLE) 5 mg tablet Take 1 Tablet by mouth daily. 30 Tablet 11    linaCLOtide (Linzess) 145 mcg cap capsule Take 1 Capsule by mouth Daily (before breakfast). With WATER only, 30 minutes before breakfast. 30 Capsule 11    anastrozole (ARIMIDEX) 1 mg tablet 1 mg = 1 tab each dose, PO, daily, # 90 tab, 2 Refills, Pharmacy: [facility]/pharmacy #[id number]     multivitamin (ONE A DAY) tablet Take 1 Tab by mouth daily. Indications: Treatment To Prevent Vitamin Deficiency 90 Tab 3    aspirin delayed-release 81 mg tablet Take 1 Tab by mouth daily. 90 Tab 3     No current facility-administered medications on file prior to visit.        Review of Systems -- obtained from daughters  [place] [address]   Constitutional: denies activity change, appetite change, fatigue, fever  HEENT: denies congestion, sinus pressure, sore throat  CV: denies chest pain, palpitations, edema  Respiratory: denies shortness of breath, wheezing  GI: denies abdominal pain, blood in stool, diarrhea, constipation  MSK: denies arthralgias, joint swelling  Neuro: denies frequent headaches, dizziness, numbness/tingling  Skin: denies skin breakdown  Psych: denies depression, anxiety, confusion, endorses chronic memory changes        Physical Exam  Constitutional: no acute distress, thin, poorly groomed AAF in wheelchair, self-propels with manual wheelchair, pleasant  HEENT: normocephalic, atraumatic, external ears normal bilaterally; moist mucous membranes; conjugate gaze  CV: regular rate and rhythm, no murmurs noted  Pulm: normal effort, normal breath sounds without wheezing or rhonchi  Abd: normal bowel sounds, soft, non-tender  : deferred  Skin: warm, dry; no breakdown noted  Neuro: A & O  X 2; pleasantly confused  Psych: calm        Advanced Care Planning    Primary Decision Maker (Active): Sumi Gaffney Child - [phone]    Code Status: DNR  Advanced Medical Directive: POST form March 2022    On this date 05/23/2022 I have spent 60 minutes reviewing previous notes, test results and face to face with the patient discussing the diagnosis and importance of compliance with the treatment plan as well as documenting on the day of the visit. An electronic signature was used to authenticate this note.   -- Юлия Johnson NP

## 2022-05-25 NOTE — PATIENT INSTRUCTIONS
Helping A Person With Dementia: Care Instructions  Your Care Instructions     Dementia is a loss of mental skills that affects daily life. It is different from mild memory loss that occurs with aging. Dementia can cause problems with memory, thinking clearly, and planning. It is different for everyone. But it usually gets worse slowly. Some people who have dementia can function well for a long time. But at some point it may become hard for the person to care for himself or herself. It can be upsetting to learn that a loved one has this condition. You may be afraid and worried about what will happen. You may wonder how you will care for the person. There is no cure for dementia. But medicine may be able to slow memory loss and improve thinking for a while. Other medicines may help with sleep, depression, and behavior changes. Dementia is different for everyone. In some cases, people can function well for a long time. You can help your loved one by making his or her home life easier and safer. You also need to take care of yourself. Caregiving can be stressful. But support is available to help you and give you a break when you need it. The Alzheimer's Association offers good information and support. If you are caring for someone with dementia, you can help make life safer and more comfortable. You can also help your loved one make decisions about future care. You may also want to bring up legal and financial issues. These are hard but important conversations to have. Follow-up care is a key part of your loved one's treatment and safety. Be sure to make and go to all appointments, and call your doctor if your loved one is having problems. It's also a good idea to know your loved one's test results and keep a list of the medicines he or she takes. How can you care for your loved one at home? Taking care of the person  · If the person takes medicine for dementia, help him or her take it exactly as prescribed. Call the doctor if you notice any problems with the medicine. · Make a list of the person's medicines. Review it with all of his or her doctors. · Help the person eat a balanced diet. Serve plenty of whole grains, fruits, and vegetables every day. If the person is not hungry at mealtimes, give snacks at midmorning and in the afternoon. Offer drinks such as Boost, Ensure, or Sustacal if the person is losing weight. · Encourage exercise. Walking and other activities may slow the decline of mental ability. Help the person stay active mentally with reading, crossword puzzles, or other hobbies. · Talk openly with the doctor about any behavior changes. Many people who have dementia become easily upset or agitated or feel worried. There are many things that can cause this, such as medicine side effects, confusion, and pain. It may be helpful to:  ? Keep distractions to a minimum. It may also help to keep noise levels low and voices quiet. ? Develop simple daily routines for bathing, dressing, and other activities. And remind your loved one often about upcoming changes to the daily routine, such as trips or appointments. ? Ask what is upsetting him or her. Keep in mind that people who have dementia don't always know why they are upset. · Take steps to help if the person is sundowning. This is the restless behavior and trouble with sleeping that may occur in late afternoon and at night. Try not to let the person nap during the day. Offer a glass of warm milk or caffeine-free tea before bedtime. · Be patient. A task may take the person longer than it used to. · For as long as he or she is able, allow your loved one to make decisions about activities, food, clothing, and other choices. Let him or her be independent, even if tasks take more time or are not done perfectly. Tailor tasks to the person's abilities. For example, if cooking is no longer safe, ask for other help.  Your loved one can help set the table, or make simple dishes such as a salad. When the person needs help, offer it gently. Staying safe  · Make your home (or your loved one's home) safe. Tack down rugs, and put no-slip tape in the tub. Install handrails, and put safety switches on stoves and appliances. Keep rooms free of clutter. Make sure walkways around furniture are clear. Do not move furniture around, because the person may become confused. · Use locks on doors and cupboards. Lock up knives, scissors, medicines, cleaning supplies, and other dangerous things. · Do not let the person drive or cook if he or she can't do it safely. A person with dementia should not drive unless he or she is able to pass an on-road driving test. Your state 's license bureau can do a driving test if there is any question. · Get medical alert jewelry for the person so that you can be contacted if he or she wanders away. If possible, provide a safe place for wandering, such as an enclosed yard or garden. Taking care of yourself  · Ask your doctor about support groups and other resources in your area. · Take care of your health. Be sure to eat healthy foods and get enough rest and exercise. · Take time for yourself. Respite services provide someone to stay with the person for a short time while you get out of the house for a few hours. · Make time for an activity that you enjoy. Read, listen to music, paint, do crafts, or play an instrument, even if it's only for a few minutes a day. · Spend time with family, friends, and others in your support system. When should you call for help? Call 911 anytime you think the person may need emergency care. For example, call if:    · The person who has dementia wanders away and you can't find him or her.     · The person who has dementia is seriously injured.    Call the doctor now or seek immediate medical care if:    · The person suddenly sees things that are not there (hallucinates).     · The person has a sudden change in his or her behavior. Watch closely for changes in the person's health, and be sure to contact the doctor if:    · The person has symptoms that could cause injury.     · The person has problems with his or her medicine.     · You need more information to care for a person with dementia.     · You need respite care so you can take a break. Where can you learn more? Go to http://www.gray.com/  Enter B382 in the search box to learn more about \"Helping A Person With Dementia: Care Instructions. \"  Current as of: June 16, 2021               Content Version: 13.2  © 6767-1413 [employer]. Care instructions adapted under license by [employer] (which disclaims liability or warranty for this information). If you have questions about a medical condition or this instruction, always ask your healthcare professional. [address] any warranty or liability for your use of this information.

## 2022-06-01 NOTE — TELEPHONE ENCOUNTER
[address] [facility] ([facility]) & Supportive Services  Social Work Telephone Encounter  Main Office: [phone]    Patient name: Huber Carrillo    Date of telephone encounter: 6/1/22    Session today completed with: Stephanie Calvo    Relationship to patient: Daughter    Purpose of call: Update re: caregiving situation, as pt's primary caregiver is moving out of the home    Conversation narrative: LCSW rec'd referral from [facility] NP, to follow up on caregiver arrangements for after pt's dtr moves out of the house. Dtr is currently pt's primary caregiver. LCSW called pt's other dtr and primary contact, [facility]. [facility] stated that her sister will be moving out, but there is not date yet as to when this will happen. They, as a family, will be talking about what they will do as far as caregiver arrangements since [facility] has physical limitations and cannot care for her mother. LCSW inquired about Medicaid. [facility] stated that they applied in the past and mother was denied, but she will reapply with the help of her niece who can do the application online. LCSW talked about the Medicaid benefit of in-home personal care aides. LCSW encouraged them to apply sooner rather than later, as it can take up to 45 days for a determination of eligibility. She expressed understanding. [facility] stated that currently she was feeling frustrated, as mom was suppose to have a new hospital bed delivered \"around 10:30 this morning\". It is now 3:30pm and no bed has been delivered, and they have called the delivery company several times. They have already dismantled mom's old bed, so if the new bed is not delivered today, [facility] plans on sleeping in her wheelchair and letting mom sleep in her bed. LCSW provided validation and supported her feelings of frustration which were evident.     Plan for follow up:LCSW to remain available for support and resources as needed, and can assist [facility] with caregiving/long term care facility options if that is what the family chooses once they meet to discuss mom's caregiving arrangements.        MARGO Velásquez, [facility]    [address]  (b) [phone]

## 2022-06-14 NOTE — TELEPHONE ENCOUNTER
Girma Lua with [address] is f/u with Jean Paul Miranda on the request for wheelchair. She contacted [facility] and they do not have the prescription on file.  Her CB # is [phone]

## 2022-06-14 NOTE — TELEPHONE ENCOUNTER
I called and spoke with Jake Quevedo, who reported speaking with [facility]/ [facility] and was told they did not have the order. I explained to Jake Quevedo that I had faxed the order yesterday (and confirmed fax number). Call placed to [facility] to follow up on fax - I spoke with Emanuel Diaz, who verbalized the faxes/orders will take over 24 hours to be taken into their system, therefore they are unable to see it today. I will call back tomorrow 6/15 to follow up and make marcela this was received.

## 2022-06-15 NOTE — TELEPHONE ENCOUNTER
Call placed to inform Lizeth Xavier OT with [facility] that I had called [facility] this morning to follow up on Wheelchair order, and was informed they had not received the faxed order. I confirmed that the fax number provided was correct. I was informed that \"sometimes it takes 2-3 attempts\" for an order to be received. I left a voice message informing Jake Sae that a second fax was sent to [address] [place] [address] by this nurse today, 6/15. Unfortunately I will not be able to make daily calls to [facility] to check on the status of the order.  If they do not receive this order, I would be glad to place an order for the [facility] with [employer],  that we usually send orders and they provide updates on their portal.

## 2022-06-22 NOTE — ED NOTES
Let registration know we need to merge the charts\" MRN [id number] and [id number]. The patients birthday is 7/1/1930 and her name is Huber Carrillo.

## 2022-06-22 NOTE — ED NOTES
Prudencio Fajardo the [facility] went to ambulate patient with a walker and reports that she stood up and reports that she can not walk. Provider aware.

## 2022-06-22 NOTE — ED TRIAGE NOTES
Pt to ED via ems from home after falling out of wheelchair today. Pt sts she tripped when she stood up, and landed on her left side. Denies hitting her head or LOC. Denies being on blood thinners. 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 Pt is aa0x1, but knows she is at the hospital.   Denies any pain or other symptoms at this time.

## 2022-06-22 NOTE — ED PROVIDER NOTES
EMERGENCY DEPARTMENT HISTORY AND PHYSICAL EXAM      Date: 6/22/2022  Patient Name: Nick Elizalde    History of Presenting Illness     Chief Complaint   Patient presents with    Fall     History Provided By: Patient and EMS    HPI: Nick Elizalde, 80 y.o. female with medical history significant for Alzheimer's dementia, hypertension, hyperlipidemia, breast cancer who presents via EMS to the ED with cc of acute fall just prior to arrival.  EMS reports that patient stood up from her wheelchair and tripped, landing on the ground. No known head injury or LOC. Patient states that she did not feel lightheaded or dizzy, she just tripped. Patient reports that she lives at home with her daughter and granddaughter. Her granddaughter stays with her during the day. She states her granddaughter will be coming up to the hospital.  Patient specifically denies any pain or symptoms. She denies headache, lightheadedness, dizziness, chest pain, shortness of breath, fever, chills, nausea, vomiting, palpitations, syncope, seizure, neck pain or stiffness, vision changes, numbness, tingling, focal weakness, redness, rash, injury or trauma, joint pain, gait abnormalities. States that she is usually ambulatory at home with a walker (although patient was in a wheelchair per report). No modifying factors or medications prior to arrival.        PCP: Ajit Gunn MD    There are no other complaints, changes, or physical findings at this time. No current facility-administered medications on file prior to encounter. No current outpatient medications on file prior to encounter. Past History     Past Medical History:  No past medical history on file. Past Surgical History:  No past surgical history on file. Family History:  No family history on file.   Social History:  Social History     Tobacco Use    Smoking status: Not on file    Smokeless tobacco: Not on file   Substance Use Topics    Alcohol use: Not on file    Drug use: Not on file     Allergies:  No Known Allergies  Review of Systems   Review of Systems   Constitutional: Negative for activity change, chills, diaphoresis, fatigue and fever. HENT: Negative for ear discharge, ear pain, hearing loss, nosebleeds, rhinorrhea and voice change. Eyes: Negative for photophobia, pain and visual disturbance. Respiratory: Negative for apnea, cough and shortness of breath. Cardiovascular: Negative for chest pain and leg swelling. Gastrointestinal: Negative for abdominal pain, diarrhea, nausea and vomiting. Genitourinary: Negative for difficulty urinating, dysuria and hematuria. Musculoskeletal: Negative for arthralgias, back pain, neck pain and neck stiffness. Skin: Negative. Negative for wound. Neurological: Negative for dizziness, tremors, seizures, syncope, facial asymmetry, speech difficulty, weakness, light-headedness, numbness and headaches. Psychiatric/Behavioral: Negative. The patient is not nervous/anxious. Physical Exam   Physical Exam  Vitals and nursing note reviewed. Constitutional:       General: She is not in acute distress. Appearance: Normal appearance. She is well-developed. She is not ill-appearing, toxic-appearing or diaphoretic. Comments: Elderly, well-appearing female lying semisupine in bed in no apparent distress. Speaking in clear complete sentences. Patient is alert and oriented to self as well as living situation. HENT:      Head: Normocephalic and atraumatic. No raccoon eyes, Tomas's sign, abrasion, contusion, right periorbital erythema, left periorbital erythema or laceration. Right Ear: Hearing and external ear normal. No drainage. No hemotympanum. Left Ear: Hearing and external ear normal. No drainage. No hemotympanum. Nose: Nose normal.      Right Nostril: No epistaxis. Left Nostril: No epistaxis. Right Sinus: No maxillary sinus tenderness or frontal sinus tenderness.       Left Sinus: No maxillary sinus tenderness or frontal sinus tenderness. Mouth/Throat:      Lips: No lesions. Mouth: Mucous membranes are moist.      Pharynx: Oropharynx is clear. Uvula midline. Eyes:      General: No visual field deficit. Conjunctiva/sclera: Conjunctivae normal.      Pupils: Pupils are equal, round, and reactive to light. Neck:      Trachea: Trachea and phonation normal.   Cardiovascular:      Rate and Rhythm: Normal rate and regular rhythm. Pulses: Normal pulses. Heart sounds: Normal heart sounds. Pulmonary:      Effort: Pulmonary effort is normal. No respiratory distress. Musculoskeletal:         General: Normal range of motion. Cervical back: Full passive range of motion without pain, normal range of motion and neck supple. No edema, erythema, signs of trauma, rigidity, torticollis or crepitus. No pain with movement, spinous process tenderness or muscular tenderness. Normal range of motion. Comments: No tenderness to palpation, crepitus, deformity, step-off, edema, instability, rash, wound, bruising, spinal tenderness to palpation. Full range of motion of spine as well as extremities. Skin:     General: Skin is warm and dry. Findings: No abrasion, bruising, ecchymosis, erythema, laceration, rash or wound. Neurological:      General: No focal deficit present. Mental Status: She is alert and oriented to person, place, and time. GCS: GCS eye subscore is 4. GCS verbal subscore is 5. GCS motor subscore is 6. Cranial Nerves: Cranial nerves are intact. No cranial nerve deficit, dysarthria or facial asymmetry. Sensory: Sensation is intact. Motor: Motor function is intact. No weakness, tremor, atrophy, seizure activity or pronator drift. Coordination: Coordination is intact. Psychiatric:         Behavior: Behavior normal.         Thought Content:  Thought content normal.         Judgment: Judgment normal.       Diagnostic Study Results Labs -   No results found for this or any previous visit (from the past 12 hour(s)). Radiologic Studies -   XR SHOULDER RT AP/LAT MIN 2 V   Final Result   No acute abnormality. CT SPINE CERV WO CONT   Final Result   No fracture. CT HEAD WO CONT   Final Result   Moderate atrophy and chronic small vessel ischemic disease the white matter. No   acute findings. No results found. Medical Decision Making   I am the first provider for this patient. I reviewed the vital signs, available nursing notes, past medical history, past surgical history, family history and social history. Vital Signs-Reviewed the patient's vital signs. No data found. Pulse Oximetry Analysis - 100% on RA (normal)    Records Reviewed: Nursing Notes, Old Medical Records, Previous Radiology Studies and Previous Laboratory Studies    Provider Notes (Medical Decision Making):   Patient presents after fall with no complaints or acute abnormal findings on exam.  Will continue to monitor in ED and obtain labs/imaging/workup as needed. Will consult family. ED Course:   Initial assessment performed. The patients presenting problems have been discussed, and they are in agreement with the care plan formulated and outlined with them. I have encouraged them to ask questions as they arise throughout their visit. ED Course as of 06/24/22 1155   Wed Jun 22, 2022   1444 Attempted to reach family member via phone number on file ([phone]) unable to leave voicemail. Patient endorses that her granddaughter should be coming up to the hospital.  We will continue to monitor patient in the ED. There is no history or exam findings concerning for emergent process at this time. Patient denies any complaints. We will continue to monitor in the ED and only obtain labs/imaging/further work-up as needed. [SM]   3168 Patient was not willing to ambulate with walker as she was \"too scared\".   Patient may only use wheelchair at home even though she said she sometimes uses the walker. [SM]   5 Family (daughter) is now at bedside. She endorses that yesterday the patient called them stating that she had had a fall and they had to call someone to come pick her up and put her back in a wheelchair. They endorse that she also had a fall last week. Patient was not complaining of any pain yesterday, but daughter states that she noticed patient rubbing her right side of her neck and heard a \"pop\" in her right shoulder this morning. Still unsure of any head injury as it was an unwitnessed fall and patient is a poor historian. Will obtain imaging at this time. Family additionally endorsed that patient has a new wheelchair and this may be why she is tripping/falling over it. She did not endorse any concern for syncope as cause of falls. Family over also notes that although she puts the blood pressure medication in the patient's pill box, the patient does not always take them as prescribed. Unsure whether patient took blood pressure medicine today. She specifically denies chest pain, shortness of breath, headache or other symptoms. [SM]   2656 SIGN OUT:  4:45 PM  Discussed pt's hx, disposition, and available diagnostic and imaging results with PEGGY Pritchard. Reviewed care plans. Both providers and patient are in agreement with care plan. Priti Rosas transferring patient care to [facility] at this time. [SM]   2755 [id number] I received this patient from Priti Rosas, [address], at the end of her shift with plans to discharge pending radiology findings. CT and x-ray results show no acute findings. I attempted to discharge the patient, however the patient's family is requesting that we now check a urine sample to assess for UTI. [AS]   1906 UA c/w UTI. Urine culture is pending. Course of Keflex initiated.   The patient and her family are in agreement this plan and discharged home. [AS]      ED Course User Index  [AS] Rizwan Peterson, [address]  [SM] Jeff Curtis PA-C         Disposition:  Pt discharged. PLAN:  1. Discharge Medication List as of 6/22/2022  6:53 PM        2. Follow-up Information     Follow up With Specialties Details Why [facility] EMERGENCY DEPT Emergency Medicine  As needed, If symptoms worsen Stephanie Grubbs    Your PCP  Call  For follow up         Return to ED if worse     Diagnosis     Clinical Impression:   1. Fall, initial encounter    2. Urinary tract infection without hematuria, site unspecified            Please note that this dictation was completed with Dragon, computer voice recognition software. Quite often unanticipated grammatical, syntax, homophones, and other interpretive errors are inadvertently transcribed by the computer software. Please disregard these errors. Additionally, please excuse any errors that have escaped final proofreading.

## 2022-06-22 NOTE — ED NOTES
Pt oriented to room and surroundings. Pt given call bell, and encouraged to use it if she is in need of assistance. Pt verbalized understanding. TV turned on for entertainment. Bed locked and in lowest position, side rails up x2 for patient safety.

## 2022-06-23 NOTE — TELEPHONE ENCOUNTER
Call placed to patient's daughter, Doron Ledesma to follow up on patient's status, after 2 falls reported. Doron Ledesma confirms patient had another fall yesterday. [facility] was called, and recommended that family should take patient to the ER. Patient was taken to [facility]. Per Doron Ledesma, patient was discharged last night, as no fractures were seen and patient was feeling better. Doron Ledesma denies changes in behavior, but reports patient is now in bed, and she is \"slower than usual\". Doron Veland also verbalizes patient has an UTI and was prescribed antibiotic. I was not able to verify this information in patient's chart.

## 2022-06-28 NOTE — ED NOTES
Discharge instructions were given to the patient by Jaja Umaña RN. The patient left the Emergency Department ambulatory, alert and oriented and in no acute distress with 1 prescriptions. The patient was encouraged to call or return to the ED for worsening issues or problems and was encouraged to schedule a follow up appointment for continuing care. The patient verbalized understanding of discharge instructions and prescriptions, all questions were answered. The patient has no further concerns at this time.

## 2022-06-28 NOTE — ED PROVIDER NOTES
EMERGENCY DEPARTMENT HISTORY AND PHYSICAL EXAM      Date: 6/28/2022  Patient Name: Parish Bojorquez    History of Presenting Illness     Chief Complaint   Patient presents with    Altered mental status     History Provided By: Patient, EMS and Patient's niece    HPI: Parish Bojorquez, 80 y.o. female with past medical history significant for CVA, hypertension, UTI, DVT, and dementia who presents via EMS to the ED with cc of altered mental status and confusion. Patient was recently diagnosed with a UTI and her niece reports that they have had a hard time getting her to take her antibiotics. Her niece states that she fell last week and again once this week. After the fall last week, she was seen in the emergency department and had a full work-up done that was unremarkable including a head CT, C-spine CT, and plain films of her shoulder. Patient denies any complaints other than left shoulder pain at this time and does not know why she is here. PMHx: CVA, hypertension, UTI, DVT, and dementia  Social Hx: Former smoker, denies alcohol use, denies illegal drug use    PCP: Ajti Gunn MD    There are no other complaints, changes, or physical findings at this time. No current facility-administered medications on file prior to encounter. Current Outpatient Medications on File Prior to Encounter   Medication Sig Dispense Refill    cephALEXin (KEFLEX) 250 mg capsule Take 250 mg by mouth four (4) times daily.  cephALEXin (Keflex) 250 mg capsule Take 1 Capsule by mouth four (4) times daily for 7 days. 28 Capsule 0    diclofenac (VOLTAREN) 1 % gel Apply 2 g to affected area every six (6) hours. (Patient taking differently: Apply 2 g to affected area as needed for Pain.) 100 g 2    acetaminophen (Arthritis Pain Relief) 650 mg TbER Take 1 Tablet by mouth every eight (8) hours as needed for Pain. 90 Tablet 5    metoprolol succinate (TOPROL-XL) 50 mg XL tablet Take 1 Tablet by mouth daily.  30 Tablet 5    traZODone (DESYREL) 50 mg tablet Take 1 Tablet by mouth nightly. For sleep, crushed in ice cream 90 Tablet 2    donepeziL (ARICEPT) 10 mg tablet Take 1 Tablet by mouth nightly. Crushed in ice cream 90 Tablet 3    simvastatin (ZOCOR) 20 mg tablet Take 1 Tablet by mouth nightly. 90 Tablet 1    loratadine (Claritin) 10 mg tablet Take 1 Tablet by mouth daily. 90 Tablet 1    Vitamin D3 25 mcg (1,000 unit) tablet TAKE 2 TABLETS BY MOUTH EVERY DAY 60 Tablet 11    lisinopriL (PRINIVIL, ZESTRIL) 20 mg tablet TAKE 1 TABLET BY MOUTH EVERY DAY 30 Tablet 5    amLODIPine (NORVASC) 5 mg tablet Take 1 Tablet by mouth daily. 90 Tablet 3    methIMAzole (TAPAZOLE) 5 mg tablet Take 1 Tablet by mouth daily. 30 Tablet 11    linaCLOtide (Linzess) 145 mcg cap capsule Take 1 Capsule by mouth Daily (before breakfast). With WATER only, 30 minutes before breakfast. (Patient taking differently: Take 1 Capsule by mouth as needed for PRN Reason (Other) (constipation). With WATER only, 30 minutes before breakfast.) 30 Capsule 11    multivitamin (ONE A DAY) tablet Take 1 Tab by mouth daily. Indications: Treatment To Prevent Vitamin Deficiency (Patient not taking: Reported on 6/24/2022) 90 Tab 3    aspirin delayed-release 81 mg tablet Take 1 Tab by mouth daily.  80 Tab 3     Past History     Past Medical History:  Past Medical History:   Diagnosis Date    Cancer ([facility] [address]     right breast.    Chronic pain     CVA     HTN (hypertension) 2/9/2011    Hypertension     Microscopic hematuria 5/11/2011    Other and unspecified hyperlipidemia 2/9/2011    Subclinical hyperthyroidism 8/29/2015    UTI (lower urinary tract infection) 8/25/2010    Venous thrombosis 2/17/2010    left arm DVT     Past Surgical History:  Past Surgical History:   Procedure Laterality Date    HX HEENT      HX HEMORRHOIDECTOMY      VT BREAST SURGERY PROCEDURE UNLISTED       Family History:  Family History   Problem Relation Age of Onset    Stroke Mother     Stroke Father     Diabetes Daughter      Social History:  Social History     Tobacco Use    Smoking status: Former Smoker    Smokeless tobacco: Never Used    Tobacco comment: smoked for 10 yrs   Vaping Use    Vaping Use: Never used   Substance Use Topics    Alcohol use: Yes     Alcohol/week: 0.8 standard drinks     Types: 1 Cans of beer per week     Comment: occ    Drug use: No     Allergies:  No Known Allergies  Review of Systems   Review of Systems   Unable to perform ROS: Dementia   Musculoskeletal: Positive for arthralgias. Physical Exam   Physical Exam  Vitals and nursing note reviewed. Constitutional:       Appearance: Normal appearance. She is well-developed. HENT:      Head: Normocephalic and atraumatic. Eyes:      Conjunctiva/sclera: Conjunctivae normal.   Cardiovascular:      Rate and Rhythm: Normal rate and regular rhythm. Pulses: Normal pulses. Heart sounds: Normal heart sounds, S1 normal and S2 normal.   Pulmonary:      Effort: Pulmonary effort is normal. No respiratory distress. Breath sounds: Normal breath sounds. No wheezing. Abdominal:      General: Bowel sounds are normal. There is no distension. Palpations: Abdomen is soft. Tenderness: There is no abdominal tenderness. There is no rebound. Musculoskeletal:         General: Normal range of motion. Cervical back: Full passive range of motion without pain, normal range of motion and neck supple. Comments: Moves all extremities spontaneously, no obvious deformity   Skin:     General: Skin is warm and dry. Findings: No rash. Neurological:      Mental Status: She is alert. Comments: Oriented to self but disoriented to month and year   Psychiatric:         Speech: Speech normal.         Behavior: Behavior normal.         Thought Content:  Thought content normal.         Judgment: Judgment normal.       Diagnostic Study Results   Labs -     Recent Results (from the past 12 hour(s))   CBC WITH AUTOMATED DIFF    Collection Time: 06/28/22  2:12 PM   Result Value Ref Range    WBC 8.7 3.6 - 11.0 K/uL    RBC 2.85 (L) 3.80 - 5.20 M/uL    HGB 9.7 (L) 11.5 - 16.0 g/dL    HCT 30.0 (L) 35.0 - 47.0 %    .3 (H) 80.0 - 99.0 FL    MCH 34.0 26.0 - 34.0 PG    MCHC 32.3 30.0 - 36.5 g/dL    RDW 15.1 (H) 11.5 - 14.5 %    PLATELET 631 840 - 497 K/uL    MPV 11.9 8.9 - 12.9 FL    NRBC 0.0 0  WBC    ABSOLUTE NRBC 0.00 0.00 - 0.01 K/uL    NEUTROPHILS 79 (H) 32 - 75 %    LYMPHOCYTES 10 (L) 12 - 49 %    MONOCYTES 9 5 - 13 %    EOSINOPHILS 0 0 - 7 %    BASOPHILS 1 0 - 1 %    IMMATURE GRANULOCYTES 1 (H) 0.0 - 0.5 %    ABS. NEUTROPHILS 6.9 1.8 - 8.0 K/UL    ABS. LYMPHOCYTES 0.9 0.8 - 3.5 K/UL    ABS. MONOCYTES 0.8 0.0 - 1.0 K/UL    ABS. EOSINOPHILS 0.0 0.0 - 0.4 K/UL    ABS. BASOPHILS 0.1 0.0 - 0.1 K/UL    ABS. IMM. GRANS. 0.1 (H) 0.00 - 0.04 K/UL    DF AUTOMATED     METABOLIC PANEL, COMPREHENSIVE    Collection Time: 06/28/22  2:12 PM   Result Value Ref Range    Sodium 136 136 - 145 mmol/L    Potassium 3.1 (L) 3.5 - 5.1 mmol/L    Chloride 101 97 - 108 mmol/L    CO2 31 21 - 32 mmol/L    Anion gap 4 (L) 5 - 15 mmol/L    Glucose 102 (H) 65 - 100 mg/dL    BUN 13 6 - 20 MG/DL    Creatinine 0.81 0.55 - 1.02 MG/DL    BUN/Creatinine ratio 16 12 - 20      GFR est AA >60 >60 ml/min/1.73m2    GFR est non-AA >60 >60 ml/min/1.73m2    Calcium 9.2 8.5 - 10.1 MG/DL    Bilirubin, total 0.8 0.2 - 1.0 MG/DL    ALT (SGPT) 11 (L) 12 - 78 U/L    AST (SGOT) 14 (L) 15 - 37 U/L    Alk.  phosphatase 81 45 - 117 U/L    Protein, total 8.1 6.4 - 8.2 g/dL    Albumin 3.2 (L) 3.5 - 5.0 g/dL    Globulin 4.9 (H) 2.0 - 4.0 g/dL    A-G Ratio 0.7 (L) 1.1 - 2.2     URINALYSIS W/ REFLEX CULTURE    Collection Time: 06/28/22  2:12 PM    Specimen: Urine   Result Value Ref Range    Color YELLOW/STRAW      Appearance CLOUDY (A) CLEAR      Specific gravity 1.015      pH (UA) 7.5 5.0 - 8.0      Protein TRACE (A) NEG mg/dL    Glucose Negative NEG mg/dL    Ketone TRACE (A) NEG mg/dL    Bilirubin Negative NEG      Blood SMALL (A) NEG      Urobilinogen 1.0 0.2 - 1.0 EU/dL    Nitrites Negative NEG      Leukocyte Esterase TRACE (A) NEG      WBC 0-4 0 - 4 /hpf    RBC 0-5 0 - 5 /hpf    Epithelial cells FEW FEW /lpf    Bacteria Negative NEG /hpf    UA:UC IF INDICATED CULTURE NOT INDICATED BY UA RESULT CNI         Radiologic Studies -   No orders to display     No results found. Medical Decision Making   I am the first provider for this patient. I reviewed the vital signs, available nursing notes, past medical history, past surgical history, family history and social history. Vital Signs-Reviewed the patient's vital signs. Patient Vitals for the past 24 hrs:   Temp Pulse Resp BP SpO2   06/28/22 1345 98.2 °F (36.8 °C) 85 18 101/75 100 %     Pulse Oximetry Analysis - 100% on RA (normal)    Cardiac Monitor:   Rate: 85 bpm  Rhythm: Normal Sinus Rhythm     Records Reviewed: Nursing Notes, Old Medical Records, Previous Radiology Studies and Previous Laboratory Studies    Provider Notes (Medical Decision Making):   25-year-old female presents via EMS with confusion per family members. She was recently seen and diagnosed with a UTI. Her urine culture is growing an E. coli that is essentially sensitive to all antibiotics. Will repeat her labs and a UA today. No indications for repeat imaging at this time. ED Course:   Initial assessment performed. The patients presenting problems have been discussed, and they are in agreement with the care plan formulated and outlined with them. I have encouraged them to ask questions as they arise throughout their visit. Progress Note  3:48 PM  I have re-evaluated pt and her UA is negative for infection today. Her potassium is slightly low at 3.1. Will replete the potassium. I spoke with patient's niece Francisca Guerrier as well as her daughter Husam Ray. Patient does not wish to be in a long-term care facility or a nursing home.   No indications for admission at this time. Will discharge with primary care follow-up. Progress Note:   Updated pt on all returned results and findings. Discussed the importance of proper follow up as referred below along with return precautions. Pt in agreement with the care plan and expresses agreement with and understanding of all items discussed. Disposition:  Discharge Note:  The pt is ready for discharge. The pt's signs, symptoms, diagnosis, and discharge instructions have been discussed and pt has conveyed their understanding. The pt is to follow up as recommended or return to ER should their symptoms worsen. Plan has been discussed and pt is in agreement. PLAN:  1. Current Discharge Medication List      START taking these medications    Details   potassium chloride SR (KLOR-CON 10) 10 mEq tablet Take 2 Tablets by mouth daily for 10 days. Qty: 20 Tablet, Refills: 0  Start date: 6/28/2022, End date: 7/8/2022           2. Follow-up Information     Follow up With Specialties Details Why Contact Info    Dr Kirsten Chaney an appointment as soon as possible for a visit       [facility] EMERGENCY DEPT Emergency Medicine  As needed, If symptoms worsen Jaswinder Sim  [phone]        Return to ED if worse     Diagnosis     Clinical Impression:   1. Fall, initial encounter    2. Dementia without behavioral disturbance, unspecified dementia type ([facility] [address]    3. Hypokalemia            Please note that this dictation was completed with Dragon, computer voice recognition software. Quite often unanticipated grammatical, syntax, homophones, and other interpretive errors are inadvertently transcribed by the computer software. Please disregard these errors. Additionally, please excuse any errors that have escaped final proofreading.

## 2022-06-28 NOTE — ED TRIAGE NOTES
Pt arrives via EMS after her daughter became noticed an altered mental status in the pt and became concerned. EMS reports that the pt was recently diagnosed with a UTI and states the pts daughter has had a hard time getting the pt to take her antibiotics. Pt has dementia and is usually incontinent with urination and defecation. Pts daughter reports the pt fell last week and has been complaining of body aches since.

## 2022-06-29 NOTE — TELEPHONE ENCOUNTER
Call returned to patient's caregiver, David Gomes. She explains that patient does not want to get out of the bed, but is able to sit on the side of the bed. David Gomes also reports poor appetite, I recommended supplemental shakes with ice cream for maximizing nutrition. Patient's albumin was 3.2 on 6/28. I explained to David Gomes that Leif Aguilar NP is out of the office this week, and patient needs to be seen by Provider for referral to Physical Therapy. I informed David Gomes that I will send this request to NP for a visit as soon as she is available next week. David Gomes verbalized understanding.

## 2022-06-29 NOTE — TELEPHONE ENCOUNTER
Yasmin Bullard called and stated that the patient has had frequent falls, and does not want to get out of the bed. Homerramone Bullard asks for PT to work w/Gertrudis and her sister to show them how to transfer the patient from the bed to the wheel chair to reduce risk of patient falling again.   # [phone]

## 2022-07-05 NOTE — TELEPHONE ENCOUNTER
Call received from Sharmin Sun reporting patient was not able to hold her neck up. Viky Monk reports that patient seems to have neck pain and she is concerned. Patient had 2 falls recently, and was assessed at [facility] ED 2 times, without any reports of injuries or fractures from the falls. When asked, Viky Monk reports patient is mostly in bed, in order to prevent other falls, and her appetite is poor. I verbalized that I believe this issue is most likely related to weakness/fatigue or stiff neck, rather than injury from the falls, as patient did not present with neck complains after the falls and last week. I offered visit from NP on 7/8/22, as previously discussed with Sherin Lay NP. But also informed Viky Monk that she could call [facility] for an assessment of the patient at home if she finds that would be helpful or patient needed to be seen sooner. Nohemi Mendoza she has [facility] phone number.

## 2022-07-05 NOTE — TELEPHONE ENCOUNTER
Call back to Darin Vogt to offer visit with Afshan Lozano NP tomorrow, 7/6/22 between 9:30-10:30. Gertrudis verbalizes agreement.

## 2022-07-05 NOTE — TELEPHONE ENCOUNTER
Call attempted to patient's daughter to schedule visit with Jen Jacobs NP on 7/8 between 12-3 PM. No answer to call. No voice mail setup. Will attempt to call later.

## 2022-07-06 NOTE — TELEPHONE ENCOUNTER
Mary called from [address] and said that the patients goals are met and she has been d/c. Codey Anderson said that the patient cannot be re-admitted for homecare due to medicare purposes.   # [phone]

## 2022-07-06 NOTE — PROGRESS NOTES
Home Based Primary Care [facility]) & Supportive Services    [id number]      NOTE: Home Based Primary Care is a PROVIDER (MD/NP) based interdisciplinary practice (RN, LCSW) for patients who cannot access (or have a taxing effort) primary / speciality medical care in an office setting. [facility] is NOT [place] but works with [address] when there is a skilled need. Our MD/NPs are integral in Care Transitions; PLEASE CALL [phone] if this patient arrives in the Emergency Department or Hospital.        Date of Current Visit: 22  Patient/Family present for Current Visit: patient, daughters [address] of Care as stated by the patient/family is: to keep patient at home as long as possible     Preferences for hospitalization if that were to be necessary:  [] Patient DOES NOT WANT hospitalization; focus all treatments at home  [x] Patient WOULD WANT hospitalization for potentially reversible causes  Patient prefers hospitalization at: [address] (: 1930) is a 80 y.o. female, established patient, here for evaluation of the following chief complaint(s):  Hospital Follow Up, Dementia, and Fall       ASSESSMENT/PLAN:  Below is the assessment and plan developed based on review of pertinent history, physical exam, labs, studies, and medications. 1. Neck pain  -     XR SPINE CERV 4 OR 5 V; Future  -     DO NOT RESUSCITATE  -     REFERRAL TO HOME HEALTH  2. Fall, subsequent encounter  -     XR SPINE CERV 4 OR 5 V; Future  -     DO NOT RESUSCITATE  -     [address] [place] [address]  3. Oral candidiasis  -     nystatin (MYCOSTATIN) 100,000 unit/mL suspension; Take 5 mL by mouth four (4) times daily. swish and swallow, Normal, Disp-473 mL, R-0  4. Alzheimer's dementia of other onset with behavioral disturbance ([facility] [address]  5. Subclinical hyperthyroidism  -     methIMAzole (TAPAZOLE) 10 mg tablet; Take 1 Tablet by mouth daily. , Normal, Disp-30 Tablet, R-5  6.  History of CVA (cerebrovascular accident)  7. History of breast cancer  8. Anemia, unspecified type  9. Essential hypertension  10. At high risk for falls  -     REFERRAL TO HOME HEALTH    Recurrent falls/neck pain - As neck pain started after second fall and neuroimaging was not done at that time, will order C-spine X-ray to rule out fracture. Encouraged 500mg tylenol three times daily for pain control and continuing topical medications as tolerated. Encouraged application of heating pads/cold packs while supervised for pain relief as well. Will follow up with OT about seatbelt for wheelchair and re-refer for PT/OT for fall prevention/transfer training, though I did explain that this may not be covered as she recently completed a course of PT/OT. Will ask our team LCSW to follow up with family to help them consider caregiving options, as it is important that Ms. Hough Mediate not be left alone due to her dementia, fall risk, and impulsivity. Oral thrush - May be contributing to both neck pain and poor PO intake. Likely related to recent antibiotic use Rx for nystatin swish and swallow sent to local pharmacy. Dementia - Currently followed by Dr. Kwasi De Jesus, neurology, last visit in April 2022 with annual follow up recommended; no medications changes were made at that time. Taking aricept 10mg daily and trazodone 50mg nightly. Eating and sleeping well; encouraged family to allow patient to eat what she wants, within reason. Discussed safety precautions in a patient with dementia, especially the importance of not leaving Ms. Doll unsupervised. I also discussed with the daughters that they may not be able to provide all of their mother's care in her home forever, given their own limitations/situations. Encouraged them to consider further options, prior to when they may be necessary.  Discussed progressive nature of dementia and what to expect as the disease progresses; poor PO intake and increased sleepiness may be related to disease progression. She has also had some challenges with transferring; will refer to PT/OT for evaluation and for fall prevention training. Hypertension/S/P Stroke - BP high today,but she has not yet had her medication; has previously been at goal so will not make any changes today. Continue lisinopril 20mg daily, amlodipine 5mg daily, toprol-XL 50mg daily. On ASA 81mg and simvastatin 20mg daily for secondary stroke prevention; may need to consider ASA discontinuation due to anemia. Will monitor. Anemia - Hemoglobin appears to have stabilized. Discussed work-up options, but at this time, I think these would be challenging to obtain and likely not impact care. Daughters are in agreement. No bryant blood or other overt symptoms of anemia; will plan to monitor and adjust treatment as needed. Normal Z74, ferritin, folic acid, and near-normal iron profile in February/March 2022. Recheck with upcoming labs in a few months. Subclinical hyperthyroidism - TSH remains low on methimazole 5mg daily. Increase to 10mg daily, with planned recheck in 6 - 8 weeks. Hx of breast cancer - On anastrozole 1mg daily. Reportedly follows with Dr. Alina Pérez. Covid-19 vaccination - Has completed primary series and reportedly recently received booster, but daughters cannot locate card. Will follow up on this at next visit. Will plan follow up in approximately 6 weeks, with labs prior. Return in 6 weeks (on 8/17/2022) for 6 week follow up with labs. SUBJECTIVE/OBJECTIVE:    Patient is seen today in her home due to taxing effort to seek primary care services in the community due to dementia, hx of CVA with impaired mobility. This is a routine follow up that was moved up due to concerns related to 2 recent falls while unsupervised. Per patient's daughters, she has had 2 falls out of her wheelchair in the past several weeks.  She was brought to the hospital each time; the first time, she had neuroimaging that did not show any acute findings and was treated for a UTI and released. No imaging was performed the second time. However, her daughters are concerned that she has had 2 falls and is now complaining about pain in her neck, R > L. They have tried topical remedies, but this is not practical as the patient rubs it off due to her dementia. She has good relief with tylenol, and sleeps through the night. She is no longer getting up on her own, as her daughters keep the wheelchair out of her reach to prevent further falls. There is no seatbelt for the wheelchair, as it is reportedly back-ordered. The daughters are requesting a referral back to PT/OT for transfer training, as they are afraid to help transfer her now. Laura Perdomo plans to move out soon, but they still have not finalized any other caregiving plans for Ms. Donald Horton and her daughters are trying to honor her wish not to go to a facility. She has also not been eating well over the past week or so, per her daughters, and has been sleeping more. The patient lives in her home with her daughters, Yvoany Duke and Laura Perdomo, and son, Michelle Haque. She had two other children who had previously . There is no Advanced Directive, but the patient's daughter Yovany Duke signs for her, typically, but the siblings have agreed on her care thus far. Her daughters are present at the time of the visit and provide most of the history, as the patient has advanced dementia. She is pleasant and cooperative, however, and able to engage in conversation when asked direct questions. Her daughter, Laura Perdomo, has her own home but has temporarily moved into her mother's home to help care for her as both Yovany Duke and Nick have significant health issues themselves. The family is concerned about what they will do when they are no longer able to care for their mother, but they are afraid that she will not want to go into a nursing facility as she adamantly wants to remain at home.  However, their own health concerns prevent them from being able to watch her 24/7, and she \"gets into a lot of trouble\", most recently putting something from the freezer into the microwave for several minutes and causing the whole home to get smoky. They are recognizing that they will not be able to provide her care forever, but have not yet decided on a plan for when they are unable to provide this care. Ms. Harish El has dementia and is followed by Dr. Apryl Pagan, neurology. She takes aricept 10mg daily, and her daughters deny behaviors, though she is starting to get angry/\"belligerent\" when they ask her to take her medications. She also takes trazodone and sleeps well. She is incontinent of urine but was previously able to get to the bedside commode for bowel movements, though now she is more frequently incontinent of stool as well. She takes linzess as needed for chronic constipation, which is working well. She has become more impulsive of late    She has a history of breast cancer, and is reportedly followed by Dr. Amish Wei at [facility]. Anastrazole was recently discontinued. She has a history of stroke, and has been wheelchair-bound since her stroke. She has no residual deficits however, and was previously able to self-propel around her home. She takes amlodipine 5mg daily, toprol 50mg daily, and lisinopril 20mg daily for her blood pressure, along with 81mg ASA and simvastatin for secondary prevention. She had previously been able to transfer herself from her wheelchair to the hospital bed, but has been having more difficulty with this of late and has had several episodes where she mis-judged the distance and has had near-falls/slides to the ground. Completed PT/OT recently, but as above, has not had a seatbelt for her wheelchair and family is hesitant to use it. She is also on tapazole for subclinical hyperthyroidism, going back to 2015. TSH was rechecked last week, and continues to be low at 0.19. Dose had been adjusted to 5mg daily.         Visit Vitals  BP (!) 177/86 (BP 1 Location: Right upper arm, BP Patient Position: Sitting, BP Cuff Size: Adult)   Pulse 65   Temp 96.9 °F (36.1 °C) (Temporal)   Resp 20   SpO2 96%     Current Outpatient Medications on File Prior to Visit   Medication Sig Dispense Refill    potassium chloride SR (KLOR-CON 10) 10 mEq tablet Take 2 Tablets by mouth daily for 10 days. 20 Tablet 0    cephALEXin (KEFLEX) 250 mg capsule Take 250 mg by mouth four (4) times daily.  diclofenac (VOLTAREN) 1 % gel Apply 2 g to affected area every six (6) hours. (Patient taking differently: Apply 2 g to affected area as needed for Pain.) 100 g 2    acetaminophen (Arthritis Pain Relief) 650 mg TbER Take 1 Tablet by mouth every eight (8) hours as needed for Pain. 90 Tablet 5    metoprolol succinate (TOPROL-XL) 50 mg XL tablet Take 1 Tablet by mouth daily. 30 Tablet 5    traZODone (DESYREL) 50 mg tablet Take 1 Tablet by mouth nightly. For sleep, crushed in ice cream 90 Tablet 2    donepeziL (ARICEPT) 10 mg tablet Take 1 Tablet by mouth nightly. Crushed in ice cream 90 Tablet 3    simvastatin (ZOCOR) 20 mg tablet Take 1 Tablet by mouth nightly. 90 Tablet 1    loratadine (Claritin) 10 mg tablet Take 1 Tablet by mouth daily. 90 Tablet 1    Vitamin D3 25 mcg (1,000 unit) tablet TAKE 2 TABLETS BY MOUTH EVERY DAY 60 Tablet 11    lisinopriL (PRINIVIL, ZESTRIL) 20 mg tablet TAKE 1 TABLET BY MOUTH EVERY DAY 30 Tablet 5    amLODIPine (NORVASC) 5 mg tablet Take 1 Tablet by mouth daily. 90 Tablet 3    linaCLOtide (Linzess) 145 mcg cap capsule Take 1 Capsule by mouth Daily (before breakfast). With WATER only, 30 minutes before breakfast. (Patient taking differently: Take 1 Capsule by mouth as needed for PRN Reason (Other) (constipation). With WATER only, 30 minutes before breakfast.) 30 Capsule 11    multivitamin (ONE A DAY) tablet Take 1 Tab by mouth daily.  Indications: Treatment To Prevent Vitamin Deficiency 90 Tab 3    aspirin delayed-release 81 mg tablet Take 1 Tab by mouth daily. 90 Tab 3    [DISCONTINUED] methIMAzole (TAPAZOLE) 5 mg tablet Take 1 Tablet by mouth daily. 30 Tablet 11     No current facility-administered medications on file prior to visit. Review of Systems -- obtained from daughters  Gabby 2   Constitutional: denies activity change, appetite change, fatigue, fever  HEENT: denies congestion, sinus pressure, sore throat  CV: denies chest pain, palpitations, edema  Respiratory: denies shortness of breath, wheezing  GI: denies abdominal pain, blood in stool, diarrhea, constipation  MSK: denies arthralgias, joint swelling; endorses neck pain x 1 week  Neuro: denies frequent headaches, dizziness, numbness/tingling  Skin: denies skin breakdown  Psych: denies depression, anxiety, confusion, endorses chronic memory changes        Physical Exam  Constitutional: no acute distress, thin, poorly groomed AAF in hospital bed, able to shift herself  HEENT: normocephalic, atraumatic, external ears normal bilaterally; moist mucous membranes; conjugate gaze; thick white layer on tongue, unable to be scraped off   CV: regular rate & rhythm, no murmurs noted  Pulm: normal effort, normal breath sounds without wheezing or rhonchi  Abd: normal bowel sounds, soft, non-tender  : deferred  Skin: warm, dry; no breakdown noted  MSK: limited ROM of cervical spine, with some tenderness to palpation along R > L paraspinal muscles; exam limited by patient cooperation  Neuro: alert, oriented to self only  Psych: calm        Advanced Care Planning    Primary Decision Maker (Active): Bogadn Diaz [facility] - [phone]    Code Status: DNR  Advanced Medical Directive: POST form March 2022    On this date 07/06/2022 I have spent 60 minutes reviewing previous notes, test results and face to face with the patient discussing the diagnosis and importance of compliance with the treatment plan as well as documenting on the day of the visit.     An electronic signature was used to authenticate this note.   -- Jonathan Jaime, NP

## 2022-07-06 NOTE — PROGRESS NOTES
Home Based Primary Care  Plan of Care    \[facility]\"" Team Members: Miki Larios MD; Claudetta Mulligan, NP; Jacque Bennett, DOROTA, Alfredo Ponce, RN; Merrill Phoenix, RN; Zo Bruce, COOPER;  Mackey Lesches, LCSW Deidra Bene  1930 / [id number]  female    The physician has reviewed and discussed the plan of care with the interdisciplinary group on 22 and agrees. Date of Initial Visit (Start of Care): 3/7/2022    Diagnosis:   Patient Active Problem List   Diagnosis Code    Venous thrombosis I82.90    Frequent urination R35.0    Cramps, extremity R25.2    Overactive bladder N32.81    Hyperlipidemia E78.5    Microscopic hematuria R31.29    Essential hypertension I10    Subclinical hyperthyroidism E05.90    Bilateral chronic knee pain M25.561, M25.562, G89.29    Major neurocognitive disorder due to probable Alzheimer's disease, without behavioral disturbance (HCC) F03.90    History of CVA (cerebrovascular accident) Z80.78    COVID-19 vaccine series completed Z92.29    Constipation K59.00    History of breast cancer Z85.3    Anemia D64.9    Alzheimer's dementia of other onset with behavioral disturbance (HCC) G30.8, F02.81       Patient status summary: 80 y.o. female who was referred to the [address] [place] [address] program due to taxing effort to seek primary care services in the community, primarily due to advanced dementia. Patient discussed today for POC review. Advance Care Planning:  Code status: DNR      Primary Decision Maker (Active): Pablo Candelario - [phone]   No flowsheet data found.     DME/Supplies:  Bedside commode, Hospital Bed and Wheelchair (non-motorized)     No Known Allergies    Nutritional Requirements:   Oral with supported meal preparation    Functional/Activity Level:  Primarily utilizes wheelchair or is bedbound    Safety Measures:   Caregiver deficit, Fall risk, Fire safety deficit and Self-care deficity    Acuity Level Ratin - Medium    Current Outpatient Medications   Medication Sig    nystatin (MYCOSTATIN) 100,000 unit/mL suspension Take 5 mL by mouth four (4) times daily. swish and swallow    methIMAzole (TAPAZOLE) 10 mg tablet Take 1 Tablet by mouth daily.  potassium chloride SR (KLOR-CON 10) 10 mEq tablet Take 2 Tablets by mouth daily for 10 days.  cephALEXin (KEFLEX) 250 mg capsule Take 250 mg by mouth four (4) times daily.  diclofenac (VOLTAREN) 1 % gel Apply 2 g to affected area every six (6) hours. (Patient taking differently: Apply 2 g to affected area as needed for Pain.)    acetaminophen (Arthritis Pain Relief) 650 mg TbER Take 1 Tablet by mouth every eight (8) hours as needed for Pain.  metoprolol succinate (TOPROL-XL) 50 mg XL tablet Take 1 Tablet by mouth daily.  traZODone (DESYREL) 50 mg tablet Take 1 Tablet by mouth nightly. For sleep, crushed in ice cream    donepeziL (ARICEPT) 10 mg tablet Take 1 Tablet by mouth nightly. Crushed in ice cream    simvastatin (ZOCOR) 20 mg tablet Take 1 Tablet by mouth nightly.  loratadine (Claritin) 10 mg tablet Take 1 Tablet by mouth daily.  Vitamin D3 25 mcg (1,000 unit) tablet TAKE 2 TABLETS BY MOUTH EVERY DAY    lisinopriL (PRINIVIL, ZESTRIL) 20 mg tablet TAKE 1 TABLET BY MOUTH EVERY DAY    amLODIPine (NORVASC) 5 mg tablet Take 1 Tablet by mouth daily.  linaCLOtide (Linzess) 145 mcg cap capsule Take 1 Capsule by mouth Daily (before breakfast). With WATER only, 30 minutes before breakfast. (Patient taking differently: Take 1 Capsule by mouth as needed for PRN Reason (Other) (constipation). With WATER only, 30 minutes before breakfast.)    multivitamin (ONE A DAY) tablet Take 1 Tab by mouth daily. Indications: Treatment To Prevent Vitamin Deficiency    aspirin delayed-release 81 mg tablet Take 1 Tab by mouth daily. No current facility-administered medications for this visit.        The Plan of Care has been initiated for during discussion at interdisciplinary group meeting  and reviewed and updated by the Interdisciplinary Group (IDG) as frequently as the patient condition warrants. Plan of Care by Discipline:    1. Provider  Identify patient's health care goal(s), Ongoing evaluation of treatment interventions for specific disease state, Reduction of polypharmacy within benefit/burden framework appropriate to age, function and disease state, Determine need for laboratory assessment based on patient disease status , Assess results of laboratory testing and adjust treatment plan as appropriate, Assess current Advance Care Planning documents and make ACP recommendations as appropriate and Discuss goals of care and treatment preferences, including resuscitation    2. Nursing  Education for safety; falls, Education for safety; fire safety, Education on plan for medical interventions in emergency / at time of crisis, Provider caregiver / family support for patient's current and changing condition and Discuss goals of care and treatment preferences, including resuscitation    3. Social Work  New patient assessment of psychosocial needs and social determinants of health, Review Emergency Preparedness Plan, Assess safety concerns and address as appropriate, Assess for caregiver burden and intervene as psychosocially indicated, Assess patient for caregiver needs to optimize psychosocial function and safety and Provide information on available community resources      Plan of Care Orders / Action Items:  Recurrent falls/neck pain - As neck pain started after second fall and neuroimaging was not done at that time, will order C-spine X-ray to rule out fracture. Encouraged 500mg tylenol three times daily for pain control and continuing topical medications as tolerated. Encouraged application of heating pads/cold packs while supervised for pain relief as well.  Will follow up with OT about seatbelt for wheelchair and re-refer for PT/OT for fall prevention/transfer training, though I did explain that this may not be covered as she recently completed a course of PT/OT. Will ask our team LCSW to follow up with family to help them consider caregiving options, as it is important that Ms. Praveen Alcala not be left alone due to her dementia, fall risk, and impulsivity. Oral thrush - May be contributing to both neck pain and poor PO intake. Likely related to recent antibiotic use Rx for nystatin swish and swallow sent to local pharmacy. Dementia - Currently followed by Dr. Danita Page, neurology, last visit in April 2022 with annual follow up recommended; no medications changes were made at that time. Taking aricept 10mg daily and trazodone 50mg nightly. Eating and sleeping well; encouraged family to allow patient to eat what she wants, within reason. Discussed safety precautions in a patient with dementia, especially the importance of not leaving Ms. Doll unsupervised. I also discussed with the daughters that they may not be able to provide all of their mother's care in her home forever, given their own limitations/situations. Encouraged them to consider further options, prior to when they may be necessary. Discussed progressive nature of dementia and what to expect as the disease progresses; poor PO intake and increased sleepiness may be related to disease progression. She has also had some challenges with transferring; will refer to PT/OT for evaluation and for fall prevention training. Hypertension/S/P Stroke - BP high during 7/6/22 visit, but she has not yet had her medication; has previously been at goal so will not make any changes at this time. Continue lisinopril 20mg daily, amlodipine 5mg daily, toprol-XL 50mg daily. On ASA 81mg and simvastatin 20mg daily for secondary stroke prevention; may need to consider ASA discontinuation due to anemia. Will monitor. Anemia - Hemoglobin appears to have stabilized. Discussed work-up options, but at this time, I think these would be challenging to obtain and likely not impact care.  Daughters are in agreement. No bryant blood or other overt symptoms of anemia; will plan to monitor and adjust treatment as needed. Normal J53, ferritin, folic acid, and near-normal iron profile in February/March 2022. Recheck with upcoming labs in a few months. Subclinical hyperthyroidism - TSH remains low on methimazole 5mg daily. Increase to 10mg daily, with planned recheck in 6 - 8 weeks. Hx of breast cancer - On anastrozole 1mg daily. Reportedly follows with Dr. Denita Muñoz. Covid-19 vaccination - Has completed primary series and reportedly recently received booster, but daughters cannot locate card. Will follow up on this at next visit.     Will plan follow up in approximately 6 weeks, with labs prior.     Return in 6 weeks (on 8/17/2022) for 6 week follow up with labs.     Health Maintenance   Topic Date Due    Shingrix Vaccine Age 49> (1 of 2) Never done    COVID-19 Vaccine (3 - Booster for Patterns series) 09/09/2022 (Originally 7/25/2021)    Pneumococcal 65+ years (1 - PCV) 09/09/2022 (Originally 7/2/1995)    Flu Vaccine (1) 09/01/2022    Lipid Screen  09/09/2022    Medicare Yearly Exam  09/10/2022    Depression Screen  05/23/2023    DTaP/Tdap/Td series (2 - Td or Tdap) 09/16/2026    Bone Densitometry (Dexa) Screening  Completed         Estimated Visit Frequency:  Every 2 month Provider Visit  Last NP visit: 7/6/22  SW visits PRN

## 2022-07-06 NOTE — TELEPHONE ENCOUNTER
Call placed to [facility] to speak with [facility].  Message left requesting call back to discuss patient's referral.

## 2022-07-07 NOTE — TELEPHONE ENCOUNTER
Call back received from Pedro Luis Noel from [phone] to inform that OT had been working with patient and has documented that family has been given education on transfers and patient has reached her goals of care. Pedro Luis Noel states this has happened several times and if patient experiences significant changes they will reconsider.

## 2022-07-08 NOTE — TELEPHONE ENCOUNTER
Call paced to [employer] to enquire about patient's X-Ray order / reason why there was only 1 view on fax sent to [address] [place] [address]. I spoke with Blanca Jean and per [email or web address]'s note, patient was not able to complete the exam.   Blanca Jean was able to see that the tech is working today and will request a call back to clarify the circumstances. Robel Haji NP was informed of the above.

## 2022-07-18 NOTE — TELEPHONE ENCOUNTER
Telephone call from [facility] with Dixie (formerly Laura) at [phone] with report of her visit with patient, two dgts and a grand dgt. States pt is not eligible for Medicaid due to assets. Gave family listing of private duty resources including agency that will do a 2 hour visit for bathing if they have staffing. Also provided elder law  information. Additionally she is requesting eval by ST for cognition and swallowing difficulties as reported by granddgt. Verbal order provided for ST to eval and treat for same.

## 2022-07-22 NOTE — TELEPHONE ENCOUNTER
LCSW attempted to reach pt's dtr, Mariana Daniels, to assess caregiving situation in the home as pt's other daughter, Kim Peraza, was going to be moving out of the house and back to her apartment. Kim Peraza had been providing much of pt's personal care and ADL assistance. No answer. No voicemail set up to leave a message. LCSW will continue efforts to reach Kasie Reel and assess safety in the home and caregiver resources.      MARGO Wall, [facility]    [address]  ) [phone]

## 2022-07-25 NOTE — TELEPHONE ENCOUNTER
Call placed to [facility], spoke with Giana who informed that patient's referral to [address] [place] [address] Jerad Khalil has been accepted by [facility]. Confirmed that no other action is needed by [facility] at this time.

## 2022-07-26 NOTE — PROGRESS NOTES
Home Health Certification approval     Initial certification: 7/84/0344     Certification period: 7/14/2022 - 9/11/2022     CCN: Sophie     Diagnosis code:  M54.2 - Cervicalgia  G30.8 - Alzheimer's Disease  D64.9 - Anemia  I10 - Essential Hypertension     I have reviewed and agree with plan of care. PT: 1wk1  OT: 1wk1  MSW 1wk1  SLP 1wk1    BIPIN CarreroC

## 2022-08-16 NOTE — TELEPHONE ENCOUNTER
Telephone call to Barry Hodgson to advise that Asiya Shah NP will assess pt at time of her scheduled visit tomorrow and advise dgt of any recommended follow ups that might be needed. Fabricio Macedo reports that patient wants to lay flat in the hospital bed and that they cannot give her food with her laying flat. Advised to elevate the HOB and feed pt, she then states that patient won't eat, she is only taking things that can be sipped through a straw. Radha Gillespie to give pt nutritional supplement like Ensure, Boost or Premier shakes instead of broths.

## 2022-08-16 NOTE — TELEPHONE ENCOUNTER
Also during previous call, reverified the patient's appt date and time. Reminded Wesley Mccullough that Diogo Colmenares could arrive anytime between 9am-1pm on 8/17/22. Wesley Mccullough said that later is better if at all possible.

## 2022-08-16 NOTE — TELEPHONE ENCOUNTER
Viky Monk called and said that the patient doesn't want to get out of the bed at all anymore. She asks that Baudilio Chu give her a callback regarding whether or not the patient needs to go to a neurologist to find out if the situation is due to the advancement of alzheimers.   # [phone]

## 2022-08-17 NOTE — PROGRESS NOTES
Home visit to patient at [facility], NP's request for labs secondary to recent changes in patient's status, such as loss of appetite and fatigue. Venipuncture x1, specimen obtained from L forearm. Labs order: CBC, CMP, TSH, T4 free. Specimen taken to [facility].

## 2022-08-17 NOTE — PROGRESS NOTES
[facility]) & Supportive Services    [phone]      NOTE: Home [facility] Care is a PROVIDER (MD/NP) based interdisciplinary practice (RN, LCSW) for patients who cannot access (or have a taxing effort) primary / speciality medical care in an office setting. [facility] is NOT [facility] but works with [address] when there is a skilled need. Our MD/NPs are integral in Care Transitions; PLEASE CALL [phone] if this patient arrives in the Emergency Department or Hospital.        Date of Current Visit: 22  Patient/Family present for Current Visit: patient, daughters [address] of Care as stated by the patient/family is: to keep patient at home as long as possible     Preferences for hospitalization if that were to be necessary:  [] Patient DOES NOT WANT hospitalization; focus all treatments at home  [x] Patient WOULD WANT hospitalization for potentially reversible causes  Patient prefers hospitalization at: [address] (: 1930) is a 80 y.o. female, established patient, here for evaluation of the following chief complaint(s):  Follow Up Chronic Condition and Dementia       ASSESSMENT/PLAN:  Below is the assessment and plan developed based on review of pertinent history, physical exam, labs, studies, and medications. 1. Alzheimer's dementia of other onset with behavioral disturbance ([facility] [address]  -     URINALYSIS W/ REFLEX CULTURE; Future  2. Altered mental status, unspecified altered mental status type  -     URINALYSIS W/ REFLEX CULTURE; Future  3. Essential hypertension  4. Subclinical hyperthyroidism  5. History of CVA (cerebrovascular accident)  6. History of breast cancer  7. Anemia, unspecified type  8. Goals of care, counseling/discussion    Dementia/Goals of Care/AMS - Currently followed by Dr. Tang Newman, neurology, last visit in 2022 with annual follow up recommended; no medications changes were made at that time.  Taking aricept 10mg daily and trazodone 50mg nightly. She has had what seems like a relatively rapid functional decline in the past few weeks, with increased sleep, decreased PO intake, and decreased mobility. Urine checked today for UTI, especially given low-grade fever; results pending and will treat if indicated. However, discussed hospice as an option for their mother's care; daughters are tearful when discussing today, but wish to discuss with their brother prior to considering hospice evaluation. I also discussed with the daughters that they may not be able to provide all of their mother's care in her home forever, given their own limitations/situations. Hypertension/S/P Stroke - BP at goal today. Continue lisinopril 20mg daily, amlodipine 5mg daily, toprol-XL 50mg daily. On ASA 81mg and simvastatin 20mg daily for secondary stroke prevention; may need to consider ASA discontinuation due to anemia. Will monitor. Anemia - Hemoglobin appears to have stabilized. Discussed work-up options, but at this time, I think these would be challenging to obtain and likely not impact care. Daughters are in agreement. No bryant blood or other overt symptoms of anemia; will plan to monitor and adjust treatment as needed. Normal C28, ferritin, folic acid, and near-normal iron profile in February/March 2022. Will ask team RN to recheck CBC this week. Subclinical hyperthyroidism - TSH remained low on methimazole 5mg daily; dose was increased spring 2022 to 10mg daily. Will ask team RN to return in the next 1 - 2 days for TFTs. Hx of breast cancer - On anastrozole 1mg daily. Reportedly follows with Dr. Carlitos Perse. Covid-19 vaccination - Has completed primary series and reportedly recently received booster, but daughters cannot locate card. Will follow up on this at next visit. Urine obtained today via sterile catheter, and brought to [facility] lab for UA with reflex culture.  Unable to obtain blood today; will ask team RN to return for labs in the next several days. Asked daughters to call after discussing hospice as an option with their brother. Follow up in 2 weeks. Return in 16 days (on 2022) for 2 week f/u. SUBJECTIVE/OBJECTIVE:    Patient is seen today in her home due to taxing effort to seek primary care services in the community due to dementia, hx of CVA with impaired mobility. This is a routine follow up, though her daughters have significant concerns about their mother and report a significant decline in function over the past several weeks. She had had several falls earlier this summer, was unable to complete neck X-ray, but is now not eating much (only some soft foods, but not nearly as much as she had been previously) and now does not get out of bed at all. She is sleeping a lot as well. Further PT/OT was denied after last visit as she had recently completed these services, but she does have a wheelchair with seatbelt, though neither of her daughters are comfortable getting her out of bed and she is not interested in transferring out of bed. They ask about bringing her back to the neurologist to determine if this is related to her dementia. They have yet to determine a feasible caregiving situation as Denita's moveout date is looming. The patient lives in her home with her daughters, Lb Nielson and Madeline Rothman, and son, Lenor Cabot. She had two other children who had previously . There is no Advanced Directive, but the patient's daughter Lb Nielson signs for her, typically, but the siblings have agreed on her care thus far. Her daughters are present at the time of the visit and provide most of the history, as the patient has advanced dementia. Her daughter, Madeline Rothman, has her own home but has temporarily moved into her mother's home to help care for her as both Lb Nielson and Nick have significant health issues themselves.  The family is concerned about what they will do when they are no longer able to care for their mother, but they are afraid that she will not want to go into a nursing facility as she adamantly wants to remain at home. However, their own health concerns prevent them from being able to watch her 24/7, and she had previously been getting into [place] lot of trouble\", though that has not happened recently since she is now staying in bed. Ms. Alireza Brennan has dementia and is followed by Dr. Jannette Trinh, neurology. She takes aricept 10mg daily, and her daughters deny behaviors, though she is starting to get angry/\"belligerent\" when they ask her to take her medications. She also takes trazodone and sleeps well. She is incontinent of urine but was previously able to get to the bedside commode for bowel movements, though now she is incontinent of stool as well. She takes linzess as needed for chronic constipation, which is working well. She has become more impulsive of late, but as above, is now sleeping more, eating less, and staying in bed all the time. She has a history of breast cancer, and is reportedly followed by Dr. Fatimah Mendez at [facility]. Anastrazole was recently discontinued. She has a history of stroke, and had been wheelchair-bound since her stroke. She has no residual deficits however, and was previously able to self-propel around her home. She takes amlodipine 5mg daily, toprol 50mg daily, and lisinopril 20mg daily for her blood pressure, along with 81mg ASA and simvastatin for secondary prevention. She had previously been able to transfer herself from her wheelchair to the hospital bed, but has been having more difficulty with this of late and has had several episodes where she mis-judged the distance and has had near-falls/slides to the ground. She is also on tapazole for subclinical hyperthyroidism, going back to 2015. TSH was rechecked spring, and was low at 0.19. Dose had been adjusted to 10mg daily, due for recheck.     Daughters are tearful when discussing hospice, but want to talk further with their brother about a hospice evaluation. Visit Vitals  /88 (BP 1 Location: Right upper arm, BP Patient Position: Lying, BP Cuff Size: Adult)   Pulse (!) 52   Temp 99.9 °F (37.7 °C) (Temporal)   Resp 18   SpO2 100%     RUE Circumference:  8/17/2022: 19.0cm    Current Outpatient Medications on File Prior to Visit   Medication Sig Dispense Refill    lisinopriL (PRINIVIL, ZESTRIL) 20 mg tablet Take 1 Tablet by mouth in the morning. Indications: high blood pressure 30 Tablet 5    methIMAzole (TAPAZOLE) 10 mg tablet Take 1 Tablet by mouth daily. 30 Tablet 5    diclofenac (VOLTAREN) 1 % gel Apply 2 g to affected area every six (6) hours. (Patient taking differently: Apply 2 g to affected area as needed for Pain.) 100 g 2    acetaminophen (Arthritis Pain Relief) 650 mg TbER Take 1 Tablet by mouth every eight (8) hours as needed for Pain. 90 Tablet 5    metoprolol succinate (TOPROL-XL) 50 mg XL tablet Take 1 Tablet by mouth daily. 30 Tablet 5    traZODone (DESYREL) 50 mg tablet Take 1 Tablet by mouth nightly. For sleep, crushed in ice cream 90 Tablet 2    donepeziL (ARICEPT) 10 mg tablet Take 1 Tablet by mouth nightly. Crushed in ice cream 90 Tablet 3    simvastatin (ZOCOR) 20 mg tablet Take 1 Tablet by mouth nightly. 90 Tablet 1    loratadine (Claritin) 10 mg tablet Take 1 Tablet by mouth daily. 90 Tablet 1    Vitamin D3 25 mcg (1,000 unit) tablet TAKE 2 TABLETS BY MOUTH EVERY DAY 60 Tablet 11    amLODIPine (NORVASC) 5 mg tablet Take 1 Tablet by mouth daily. 90 Tablet 3    multivitamin (ONE A DAY) tablet Take 1 Tab by mouth daily. Indications: Treatment To Prevent Vitamin Deficiency 90 Tab 3    aspirin delayed-release 81 mg tablet Take 1 Tab by mouth daily. 90 Tab 3    [DISCONTINUED] nystatin (MYCOSTATIN) 100,000 unit/mL suspension TAKE 5 ML BY MOUTH FOUR (4) TIMES DAILY. SWISH AND SWALLOW (Patient not taking: Reported on 8/17/2022) 480 mL 0    [DISCONTINUED] cephALEXin (KEFLEX) 250 mg capsule Take 250 mg by mouth four (4) times daily. (Patient not taking: Reported on 8/17/2022)      linaCLOtide (Linzess) 145 mcg cap capsule Take 1 Capsule by mouth Daily (before breakfast). With WATER only, 30 minutes before breakfast. (Patient taking differently: Take 145 mcg by mouth as needed for PRN Reason (Other) (constipation). With WATER only, 30 minutes before breakfast.) 30 Capsule 11     No current facility-administered medications on file prior to visit.        Review of Systems -- obtained from daughters  Gabby 2   Constitutional: denies fatigue, fever; endorses activity change, appetite changes  HEENT: denies congestion, sinus pressure, sore throat  CV: denies chest pain, palpitations, edema  Respiratory: denies shortness of breath, wheezing  GI: denies abdominal pain, blood in stool, diarrhea, constipation  MSK: denies arthralgias, joint swelling  Neuro: denies frequent headaches, dizziness, numbness/tingling  Skin: denies skin breakdown  Psych: denies depression, anxiety, confusion, endorses chronic memory changes        Physical Exam  Constitutional: no acute distress, cachectic, poorly groomed AAF in hospital bed, weaker than last visit with minimal ability to reposition herself  HEENT: normocephalic, atraumatic, external ears normal bilaterally; moist mucous membranes; conjugate gaze  CV: regular rate & rhythm, no murmurs noted  Pulm: normal effort, normal breath sounds without wheezing or rhonchi  Abd: normal bowel sounds, soft, non-tender  : urine foul-smelling  Skin: warm, dry; no breakdown noted  Neuro: alert, oriented to self only  Psych: calm        Advanced Care Planning    Primary Decision Maker (Active): Maggie Miller Child - [phone]    Code Status: DNR  Advanced Medical Directive: POST form March 2022    On this date 08/17/2022 I have spent 60 minutes reviewing previous notes, test results, catheterizing patient, and face to face with the patient and family discussing the diagnosis and importance of compliance with the treatment plan as well as documenting on the day of the visit, with > 50% of the time spent in counseling and coordination of care. An electronic signature was used to authenticate this note.   -- Barrington Mccoy NP

## 2022-08-17 NOTE — TELEPHONE ENCOUNTER
Called daughter Augusto Amaro with results of UA, which indicate likely UTI. Will start macrobid 100mg BID, with culture pending. She verbalized understanding.   Joce Brice NP

## 2022-08-18 NOTE — TELEPHONE ENCOUNTER
Called patient's grandchild Valentine Stephenson) BRIDGER in chart. Reports that her grandmother is progressively declining. States that she has gotten worse after falling 2 times in the summer time. Reports she followed up with her PCP, gotten x-rays done-no broken bones, denies hitting head. Reports now she does not want to get out of bed, not swallowing properly (would only eat soft foods), drooling, losing weight easily, becoming restless at night and sometimes getting aggressive with her family. Family asked if she could possible be on hospice.

## 2022-08-18 NOTE — PROGRESS NOTES
Referral faxed to the [facility] ([phone]). Received a successful confirmation back.

## 2022-08-18 NOTE — TELEPHONE ENCOUNTER
Becka Cohen returned call. Discussed findings of low Hgb and very low albumin (2.6). Reports that her mother seems about the same today. Asked about hospice; she reports they plan to have a family meeting on Saturday to discuss and wants to wait until after that time for referral. Told her that I would call next week for an update.   Catalino Smith, NP

## 2022-08-18 NOTE — TELEPHONE ENCOUNTER
Fax sent to [address] [place] [address],[address] Lab to ADD-ON CMP order to the existing BMP order.    Prior to faxing, I called the lab to confirm the correct process for this additional test.

## 2022-08-18 NOTE — TELEPHONE ENCOUNTER
Okay this activate hospice to see if additional services can open up to help make her comfortable as well as allow more quality time with family.   luisa activate [facility]

## 2022-08-18 NOTE — TELEPHONE ENCOUNTER
Attempted to reach daughters Yovany Duke and/or Laura Perdomo using both numbers in the chart to discuss lab results & plan for their mother; no answer, lvm to call back.   Tami Carpenter, NP

## 2022-08-18 NOTE — TELEPHONE ENCOUNTER
Patient granddaughter requesting a call  to discuss the patient current medical condition. The patient has dementia and condition is declining. Please call to discuss.

## 2022-08-19 NOTE — TELEPHONE ENCOUNTER
[facility] with  and was informed they will be working on patient's intake at this time and will reach out to the family to arrange admission.

## 2022-08-19 NOTE — TELEPHONE ENCOUNTER
Terrance Veloz called and said that the patient is having trouble swallowing the nitro certone mono medication. She wanted to know if it was available in liquid form.   # [phone]

## 2022-08-19 NOTE — TELEPHONE ENCOUNTER
Call returned to Wesley Mccullough (and Stoney on the speaker) to advise family to open the capsule and sprinkle the medication in apple sauce as this should be easier to give than the liquid form. Gertrudis verbalizes understanding.

## 2022-08-21 NOTE — HOSPICE
Megan Lezama  1930   81 yo female                                                         Principle Hospice Diagnosis: Alzheimers Dementia,  Diagnoses RELATED to the terminal prognosis:  HX falls,Altered mental status,  Other Diagnoses: Essential hypertension, Subclinical Hyperthyroidism, History of CVA, Hx of breast cancer, Anemia, Hx UTI        Date of Hospice Admission: 22  Hospice Attending Elected by Patient:  Leigh Georgeeduardo  Primary Care Physician: Dr. Kelly Figueroa RN: Sejal Man RN  Nurse CM: [id number] Service : Kyler Ledesma  : Shelly Castellanos DNR:  Yes on file   Service: No    Appropriate for Pinning Ceremony:  N/A   Home: TBD        Hospice Summary: Upon visit arrival pt. noted lying in her hospital bed toward the right side. Daughters Gail Huerta and Cherrie Landeros present. Pt. is alert to self. She can speak but only a few intelligible words. Her appetite has decreased (22 Albumin 2.6), she is incontinent, she sleeps more now and she has dysphagia as well. She has had 4 falls in the past 2 months and has declined to being non-ambulatory since the last fall. Pt.s stays in a drawn up position and when repositioned groans. I educated the family regarding using the Tylenol that she has ordered for pain and that the [facility] has been ordered (via [employer] for mail delivery) that will have stronger medication for pain and other SX.Pt. is on [address] for a UTI of which she has had 3 UTI's in the past year. Family educated re: turning and repositioning pt. and premedicating for pain. During visit pt. had a large amount of urine and loose stool on the bed sheets,her shirt and underpads. Pericare was provided,fresh linens and clothes donned and pt. was pulled up in bed and repositioned and propped with pillows for comfort. John order placed #[id number] for OTB table and [facility] bed,w/c pt. owns). Granddaughter Ahmet Brandt updated on Hospice POC. Medline supply order [facility] # [id number]. No med refills needed approx. 5 wks of pre-packed pills from [facility] in the home. Family encouraged to call Hospice with any questions or concerns. ER Visits/ Hospitalizations in past year: 0  Onset Date of Hospice Diagnosis: Progressive decline      Summary of Disease Progression Leading to Hospice Diagnosis:   Shana Rutledge NP   Nurse Practitioner   Specialty:  Nurse Practitioner    Progress Notes       Signed Encounter Date:  2022   Home Based Primary Care (HBPC) & Supportive Services    [phone]  \"NOTE: Home Based Primary Care is a PROVIDER (MD/NP) based interdisciplinary practice (RN, LCSW) for patients who cannot access (or have a taxing effort) primary / speciality medical care in an office setting. [facility] is NOT [facility] but works with [address] [place] [address]. when there is a skilled need. Our MD/NPs are integral in Care Transitions; PLEASE CALL [phone] if this patient arrives in the Emergency Department or Hospital.   Date of Current Visit: 22  Patient/Family present for Current Visit: patient, daughters [address] of Care as stated by the patient/family is: to keep patient at home as long as possible  Preferences for hospitalization if that were to be necessary:  Patient DOES NOT WANT hospitalization; focus all treatments at home  Patient WOULD WANT hospitalization for potentially reversible causes  Patient prefers hospitalization at: [facility] (: 1930) is a 80 y.o. female, established patient, here for evaluation of the following chief complaint(s):  Follow Up Chronic Condition and Dementia  ASSESSMENT/PLAN:  Below is the assessment and plan developed based on review of pertinent history, physical exam, labs, studies, and medications. 1. Alzheimer's dementia of other onset with behavioral disturbance ([facility] [address]  -     URINALYSIS W/ REFLEX CULTURE; Future  2.  Altered mental status, unspecified altered mental status type  - URINALYSIS W/ REFLEX CULTURE; Future  3. Essential hypertension  4. Subclinical hyperthyroidism  5. History of CVA (cerebrovascular accident)  6. History of breast cancer  7. Anemia, unspecified type  8. Goals of care, counseling/discussion  Dementia/Goals of Care/AMS - Currently followed by Dr. Apryl Pagan, neurology, last visit in April 2022 with annual follow up recommended; no medications changes were made at that time. Taking aricept 10mg daily and trazodone 50mg nightly. She has had what seems like a relatively rapid functional decline in the past few weeks, with increased sleep, decreased PO intake, and decreased mobility. Urine checked today for UTI, especially given low-grade fever; results pending and will treat if indicated. However, discussed hospice as an option for their mother's care; daughters are tearful when discussing today, but wish to discuss with their brother prior to considering hospice evaluation. I also discussed with the daughters that they may not be able to provide all of their mother's care in her home forever, given their own limitations/situations. Hypertension/S/P Stroke - BP at goal today. Continue lisinopril 20mg daily, amlodipine 5mg daily, toprol-XL 50mg daily. On ASA 81mg and simvastatin 20mg daily for secondary stroke prevention; may need to consider ASA discontinuation due to anemia. Will monitor. Anemia - Hemoglobin appears to have stabilized. Discussed work-up options, but at this time, I think these would be challenging to obtain and likely not impact care. Daughters are in agreement. No bryant blood or other overt symptoms of anemia; will plan to monitor and adjust treatment as needed. Normal M55, ferritin, folic acid, and near-normal iron profile in February/March 2022. Will ask team RN to recheck CBC this week. Subclinical hyperthyroidism - TSH remained low on methimazole 5mg daily; dose was increased spring 2022 to 10mg daily.  Will ask team RN to return in the next 1 - 2 days for TFTs. Hx of breast cancer - On anastrozole 1mg daily. Reportedly follows with Dr. Ciarra Martinez. Covid-19 vaccination - Has completed primary series and reportedly recently received booster, but daughters cannot locate card. Will follow up on this at next visit. Urine obtained today via sterile catheter, and brought to [facility] lab for UA with reflex culture. Unable to obtain blood today; will ask team RN to return for labs in the next several days. Asked daughters to call after discussing hospice as an option with their brother. Follow up in 2 weeks. Return in 16 days (on 2022) for 2 week f/u. SUBJECTIVE/OBJECTIVE:  Patient is seen today in her home due to taxing effort to seek primary care services in the community due to dementia, hx of CVA with impaired mobility. This is a routine follow up, though her daughters have significant concerns about their mother and report a significant decline in function over the past several weeks. She had had several falls earlier this summer, was unable to complete neck X-ray, but is now not eating much (only some soft foods, but not nearly as much as she had been previously) and now does not get out of bed at all. She is sleeping a lot as well. Further PT/OT was denied after last visit as she had recently completed these services, but she does have a wheelchair with seatbelt, though neither of her daughters are comfortable getting her out of bed and she is not interested in transferring out of bed. They ask about bringing her back to the neurologist to determine if this is related to her dementia. They have yet to determine a feasible caregiving situation as Denita's moveout date is looming. The patient lives in her home with her daughters, Argentina Leggett and Cherrie Landeros, and son, Neva Caraballo. She had two other children who had previously .  There is no Advanced Directive, but the patient's daughter Argentina Leggett signs for her, typically, but the siblings have agreed on her care thus far. Her daughters are present at the time of the visit and provide most of the history, as the patient has advanced dementia. Her daughter, Cherrie Landeros, has her own home but has temporarily moved into her mother's home to help care for her as both Argentina Leggett and Nick have significant health issues themselves. The family is concerned about what they will do when they are no longer able to care for their mother, but they are afraid that she will not want to go into a nursing facility as she adamantly wants to remain at home. However, their own health concerns prevent them from being able to watch her 24/7, and she had previously been getting into [place] lot of trouble\", though that has not happened recently since she is now staying in bed. Ms. Bebeto Vela has dementia and is followed by Dr. Melvi Cedillo, neurology. She takes aricept 10mg daily, and her daughters deny behaviors, though she is starting to get angry/\"belligerent\" when they ask her to take her medications. She also takes trazodone and sleeps well. She is incontinent of urine but was previously able to get to the bedside commode for bowel movements, though now she is incontinent of stool as well. She takes linzess as needed for chronic constipation, which is working well. She has become more impulsive of late, but as above, is now sleeping more, eating less, and staying in bed all the time. She has a history of breast cancer, and is reportedly followed by Dr. Ciarra Martinez at [facility]. Anastrazole was recently discontinued. She has a history of stroke, and had been wheelchair-bound since her stroke. She has no residual deficits however, and was previously able to self-propel around her home. She takes amlodipine 5mg daily, toprol 50mg daily, and lisinopril 20mg daily for her blood pressure, along with 81mg ASA and simvastatin for secondary prevention.  She had previously been able to transfer herself from her wheelchair to the hospital bed, but has been having more difficulty with this of late and has had several episodes where she mis-judged the distance and has had near-falls/slides to the ground. She is also on tapazole for subclinical hyperthyroidism, going back to 2015. TSH was rechecked spring, and was low at 0.19. Dose had been adjusted to 10mg daily, due for recheck. Daughters are tearful when discussing hospice, but want to talk further with their brother about a hospice evaluation. Visit Vitals  BP   132/88 (BP 1 Location: Right upper arm, BP Patient Position: Lying, BP Cuff Size: Adult)   Pulse   (!) 52   Temp   99.9 °F (37.7 °C) (Temporal)   Resp   18   SpO2   100%   RUE Circumference:  8/17/2022: 19.0cm  Current Outpatient Medications on File Prior to Visit   Medication   Sig   Dispense   Refill   o   lisinopriL (PRINIVIL, ZESTRIL) 20 mg tablet   Take 1 Tablet by mouth in the morning. Indications: high blood pressure   30 Tablet   5   o   methIMAzole (TAPAZOLE) 10 mg tablet   Take 1 Tablet by mouth daily. 30 Tablet   5   o   diclofenac (VOLTAREN) 1 % gel   Apply 2 g to affected area every six (6) hours. (Patient taking differently: Apply 2 g to affected area as needed for Pain.)   100 g   2   o   acetaminophen (Arthritis Pain Relief) 650 mg TbER   Take 1 Tablet by mouth every eight (8) hours as needed for Pain. 90 Tablet   5   o   metoprolol succinate (TOPROL-XL) 50 mg XL tablet   Take 1 Tablet by mouth daily. 30 Tablet   5   o   traZODone (DESYREL) 50 mg tablet   Take 1 Tablet by mouth nightly. For sleep, crushed in ice cream   90 Tablet   2   o   donepeziL (ARICEPT) 10 mg tablet   Take 1 Tablet by mouth nightly. Crushed in ice cream   90 Tablet   3   o   simvastatin (ZOCOR) 20 mg tablet   Take 1 Tablet by mouth nightly. 90 Tablet   1   o   loratadine (Claritin) 10 mg tablet   Take 1 Tablet by mouth daily.    90 Tablet   1   o   Vitamin D3 25 mcg (1,000 unit) tablet   TAKE 2 TABLETS BY MOUTH EVERY DAY   60 Tablet   11   o   amLODIPine (NORVASC) 5 mg tablet   Take 1 Tablet by mouth daily. 90 Tablet   3   o   multivitamin (ONE A DAY) tablet   Take 1 Tab by mouth daily. Indications: Treatment To Prevent Vitamin Deficiency   90 Tab   3   o   aspirin delayed-release 81 mg tablet   Take 1 Tab by mouth daily. 90 Tab   3   o   [DISCONTINUED] nystatin (MYCOSTATIN) 100,000 unit/mL suspension   TAKE 5 ML BY MOUTH FOUR (4) TIMES DAILY. SWISH AND SWALLOW (Patient not taking: Reported on 8/17/2022)   480 mL   0   o   [DISCONTINUED] cephALEXin (KEFLEX) 250 mg capsule   Take 250 mg by mouth four (4) times daily. (Patient not taking: Reported on 8/17/2022)   o   linaCLOtide (Linzess) 145 mcg cap capsule   Take 1 Capsule by mouth Daily (before breakfast). With WATER only, 30 minutes before breakfast. (Patient taking differently: Take 145 mcg by mouth as needed for PRN Reason (Other) (constipation). With WATER only, 30 minutes before breakfast.)   30 Capsule   11   No current facility-administered medications on file prior to visit.    Review of Systems -- obtained from don Pierre 2   Constitutional: denies fatigue, fever; endorses activity change, appetite changes  HEENT: denies congestion, sinus pressure, sore throat  CV: denies chest pain, palpitations, edema  Respiratory: denies shortness of breath, wheezing  GI: denies abdominal pain, blood in stool, diarrhea, constipation  MSK: denies arthralgias, joint swelling  Neuro: denies frequent headaches, dizziness, numbness/tingling  Skin: denies skin breakdown  Psych: denies depression, anxiety, confusion, endorses chronic memory changes  Physical Exam  Constitutional: no acute distress, cachectic, poorly groomed AAF in hospital bed, weaker than last visit with minimal ability to reposition herself  HEENT: normocephalic, atraumatic, external ears normal bilaterally; moist mucous membranes; conjugate gaze  CV: regular rate & rhythm, no murmurs noted  Pulm: normal effort, normal breath sounds without wheezing or rhonchi  Abd: normal bowel sounds, soft, non-tender  : urine foul-smelling  Skin: warm, dry; no breakdown noted  Neuro: alert, oriented to self only  Psych: calm  Advanced Care Planning  Primary Decision Maker (Active): Paige Fabry Child - [phone]  Code Status: DNR  Advanced Medical Directive: POST form March 2022  On this date 08/17/2022 I have spent 60 minutes reviewing previous notes, test results, catheterizing patient, and face to face with the patient and family discussing the diagnosis and importance of compliance with the treatment plan as well as documenting on the day of the visit, with > 50% of the time spent in counseling and coordination of care. An electronic signature was used to authenticate this note.   -- Barrington Mccoy NP    Electronically signed by An Rivera NP at 08/17/22 1318  Note Details  Author An Rivera NP File Time 08/17/22 1318   Author Type Nurse Practitioner Status Signed   Last  An Rviera NP Specialty Nurse Practitioner   Home Visit on 8/17/2022  Detailed Report  Note shared with patient  Additional Documentation  Vitals:    /88 (BP 1 Location: Right upper arm, BP Patient Position: Lying, BP Cuff Size: Adult)  Pulse 52 Important   Temp 99.9 °F (37.7 °C) (Temporal)  Resp 18  SpO2 100%  Pain Sc   0 - No pain  Flowsheets:     Vitals Reassessment  Encounter Info:     Billing Info,  History,  Allergies,  Detailed Report  Orders Placed  URINALYSIS W/ REFLEX CULTURE  Medication Changes  Refills  Start Date  End Date  Discontinued or Completed: cephALEXin (KEFLEX) 250 mg capsule (Therapy Completed)   Patient-reported medication   Discontinued or Completed: nystatin (MYCOSTATIN) 100,000 unit/mL suspension (Therapy Completed)   Visit Diagnoses  Alzheimer's dementia of other onset with behavioral disturbance ([facility] [address]  Altered mental status, unspecified altered mental status type  Essential hypertension  Subclinical hyperthyroidism  History of CVA (cerebrovascular accident)  History of breast cancer  Anemia, unspecified type  Goals of care, counseling/discussion  Problem List\". Use LCD Guidelines and list features:     Patients will be considered to be in the terminal stage of dementia (life expectancy of six months or less) if they meet the following criteria. Patients with dementia should show all the following characteristics:    ___X_____  1. Stage seven or beyond according to the Functional Assessment Staging Scale;  ___X_____  2. Unable to ambulate without assistance;  ___X_____  3. Unable to dress without assistance;  ___X_____  4. Unable to bathe without assistance;  ___X_____  5. Urinary and fecal incontinence, intermittent or constant;  ___X_____  6. No consistently meaningful verbal communication: stereotypical phrases only or the ability             to speak is limited to six or fewer intelligible words. Patients should have had one of the following within the past 12 months:    ________  1. Aspiration pneumonia;  ________  2. Pyelonephritis or other upper urinary tract infection;  ________  3. Septicemia;  ________  4. Decubitus ulcers, multiple, stage 3-4;  ________  5. Fever, recurrent after antibiotics;  ___X_____  6. Inability to maintain sufficient fluid and calorie intake with 10% weight loss during the            previous six months or serum albumin Note: This section is specific for Alzheimer's Disease            and related disorders, and is not appropriate for other types of dementia, such as multi-           infarct dementia. SPIRITUAL/Social/Emotional:  No issues reported    Psych/ Social/ Emotional Issues Identified:   No issues reported    Dr. Trudy Griffin on behalf of Dr. Darnell Guallpa contacted, agrees to serve as attending provider for hospice and provided verbal certification of terminal illness with prognosis of 6 months or less life expectancy.  Orders for hospice admission, medications and plan of treatment received. Medication reconciliation completed.     Currently this patient has: N/A      MEDS:  I have reviewed the patient's medication list with MD and identified the following:  Nonformulary medications: Donazepil, Voltaren Gel  Unrelated medications: Norvasc,Lisinopril,Metoprolol,Vit D3,Claritin,Tapazole,Simvastatin    IDT communication to include MD, SN, SW, CH and support team.

## 2022-08-22 NOTE — TELEPHONE ENCOUNTER
The patient's daughter, Deborah Escamilla left a message on [employer] on 8/20/22 at 10:33 am. States the patient did not urinate all night. Should they be concerned? Please give her a call back [phone].

## 2022-08-23 NOTE — HOSPICE
Hospice RN visit with patient and granddaughter Rik Yu. Patient in bed, alert to self, speech slurred, can occasionally make needs known. Rik Yu states patient has occasional cough after swallowing, patient drooling, RN recommended use of Levsin from [facility]. Patient takes pills crushed, eating purees but very little. Last BM 8/21. Patient weak and bedbound, Rik Yu reports she cries out with any touch or movement. Per family request, RN discussed possible order of moderate pain relief in order to take frequently, family reluctant for morphine yet. Also discussed with granddaughter placement of Howe, Rik Yu agrees. 16Fr Howe placed with no issues, clear mirna urine return on insertion. Patient repositioned for comfort. No med refills needed, supplies ordered on admission due for delivery today. Provided oral swabs from [facility]. Family understand to call hospice with any needs.

## 2022-08-24 NOTE — TELEPHONE ENCOUNTER
Call placed to [facility] to inform that patient was discharged to [address] [place] [address] on 8/20. I was informed that that the clinical manager will complete the discharge of the patient from [facility].

## 2022-08-25 NOTE — HOSPICE
PRN visit made to patient to administer suppository for constipation and check dumont catheter patency. Patient received in bed alert, denies pain and shortness of breath. Vitals WNL, dumont in place no leakage noted mirna colored urine in bag. Per chart and family last bowel movement was on 8/21 patient does have decreased intake. Abdomen is soft and nontender bisacodyl suppository administered without difficulty, smears of stool noted and cleaned. Family given instructions to check patient for BM and medication may take 20min or longer to take effect, family voiced understanding. Encouraged to call hospice with any questions or concerns. Bisacodyl refill ordered from [facility].

## 2022-08-29 NOTE — HOSPICE
The patient is a 80 y.o female w. hospice dx Alzheimer's dementia without behavioral disturbance. SW made contact to Pema Major the patient daughter and she asked if I call Niece Keyon Garcias [phone] whos been taking care of majority of the pts affairs and things since her grandmother has been on hospice. Its going 3rd experience with Alyssa Fry stated her mom, 2 uncles, and now grandmother has been on hospice. Keyon Garcias shared theirs been no complaints thus far and our team and services are excellent even how supportive we are. Keyon Garcias stated having a recent a breakdown and her past experiences with death and now its hitting a little different with pt. Keyon Garcias shared that herself and the pt are very close and its been very hard to accept. MSW provided emotional support and validating Yeimi feelings regarding past loss and current situation with pt being on hospice. The patient currently is living with two aunts and one is in a wheelchair and the other one that's not is a little jittery. The patient screams and moans when touched. Two months ago the pt was capable doing everything herself but after she fell she lost her independence. Work from home she calls my name the entire time and MPOA. Keyon Garcias has a young child 9year old try to get to pt from 1-6pm often or daily. The demands of work and think about taking a leave, from work has been a thought. Keyon Garcias shares having PTSD watching her family who  while on hospice. Patient has a DDNR in place. Family was educated on not calling 911 and to call hospice when in emergency. MSW answered questions, provided education on hospice philosophy, role of SW, hospice team, respite, volunteers possible return, and counseling services. MSW provided supportive counseling and encouraged patient's family to call with any questions, concerns, or to request a visit. Patient/family were appreciative of the MSW service.

## 2022-08-29 NOTE — HOSPICE
PRN visit to assess urinary catheter/pelvic pain. Assessment: On arrival caregiver greets this RN at door and states that she is concerned that catheter is causing pain and that there are new areas of skin breakdown. The patient is found lying in bed in left lateral decubitus position, contractures in lower and upper extremities bilaterally, chronically ill-appearing, does not respond to questions but moans when repositioned. Does not appear in respiratory distress, RR 16-18 per minute, on RA. No edema noted. Urinary catheter is patent and able to advance tubing further into bladder, no concerns for ballon being pulled partially into the urethera, currently on ABX course for UTI symptoms per caregiver, intermittent increased pelvic pain which sounds episodic. ? Possible bladder spasms ? Assisted with brief change, as the patient is soiled with stool. New stage III pressure wounds: 1 over sacrum/coccyx, 2 near right gluteal cleft. Applied zinc and optifoam dressing. Poor appetite is also reported and eating only bites and sips, steady decline towards end of life. Discussed with caregiver that patient may be having bladder spasms and recommend using ativan as muscle relaxer- verbalized understanding of education. Caregiver also educated on using Norco for longer lasting pain relief and morphine for breakthrough symptoms or for premedication for wound/intensive ADL care PRN- caregiver verbalized understanding of education. Encouraged to call hospice with any needs. Wound care supplies ordered for mail order delivery.

## 2022-08-30 NOTE — PROGRESS NOTES
1615  Pt arrived at the [facility]. Pt is alert to self. Pt grimaced with transfer. Brows furrowed. Lungs clear but diminished. No cough noted. Pt is on RA. + bowel sounds. Pt has a smear this am.  Howe is draining cloudy mirna urine. No edema noted. Skin is dry and flaky. Dressing on sacrum is intact. 1700  Dressing changed on sacrum. Pt has   2 areas on the top of her  butt cheeks that are draining foul smelling tan drainage. [phone]  Phone call to Dtr Farheen Hylton to advise that Pt had arrived and Dr Pj Turner had made some medication adjustments. Family appreciative on Respite stay. Pt tolerated 50% of her dinner. 65 Phone call from [facility] for an update. Update given. 1805  Pt medicated with Morphine and Lorazepam for pain. 1845  Pt sleeping. No facial grimacing. 1900  report given. NAME OF PATIENT:  Cristo Tay    LEVEL OF CARE:  Respite    REASON FOR GIP:  n/a    *PATIENT REMAINS ELIGIBLE FOR [facility] LEVEL OF CARE AS EVIDENCED BY: (MUST BE ADDRESSED OF PATIENT GIP)  n/a      REASON FOR RESPITE:  Caregiver cannot care for patient due to:  exhaustion    O2 SAFETY:  RA    FALL INTERVENTIONS PROVIDED:   Implemented/recommended assistive devices and encouraged their use, Implemented/recommended resources for alarm system (personal alarm, bed alarm, call bell, etc.) , and Implemented/recommended environmental changes (remove hazards, lower bed, improve lighting, etc.)    INTERDISPLINARY COMMUNICATION/COLLABORATION:  Physician, MARGO, Negrita Gayle, and RN, BHAVIN    NEW MEDICATION INITIATION DOCUMENTATION:  No new medications initiated.     Reason medication is being initiated:  n/a    MD / Provider name consulted re: change in status / initiation of new medication:  n/a    New Symptom(s):  n/a    New Order(s):  n/a    Name of the person notified of the changes:  n/a    Name of person being taught:  n/a    Instructions given:  n/a    Side Effects taught:  n/a    Response to teaching: n/a      COMFORTABLE DYING MEASURE:  Is Patient/family satisfied with symptom level?  yes    DISCHARGE PLAN:  Pt will complete her Respite stay at the [facility] and then return home and continue to be followed by Home Hospice.

## 2022-08-30 NOTE — H&P
[facility]  [phone]    Patient Name: Robert Grijalva  YOB: 1930    Date of Provider Hospice Visit: 08/30/22    Level of Care:   [] General Inpatient (GIP)    [] Routine   [x] Respite    Current Location of Care:  [] [address] [place] [address] [] [facility] [] [address] [] [facility] [x] Hospice House [facility]      Principle Hospice Diagnosis: Alzheimer's dementia       HOSPICE SUMMARY     Chart Reviewed for patient's medical history and hospice care plan. Hospice Physician Certification/Recertification Narrative per Gentry Jimenez / other MD:     Patient sent to the [facility] hospice house for respite level care secondary to caregiver exhaustion. In addition, patient has been declining in the home with increasing wounds. She has had decreased p.o. intake, increased confusion and currently on service for Alzheimer's dementia. The team visited earlier today patient appeared to show increasing symptoms of restlessness and pain. Patient does have a sacral wound which has been covered. General-ill-appearing female who attempts to mumble a few words, slightly restless  HEENT-slightly dry oral mucosa  Lungs-clear to auscultation  Cardiovascularly-appears regular rate and rhythm  Abdomen-soft, thin, nontender  Extremities-no clubbing or stenosis  Derm-sacral wound is covered, small amount of drainage per bedside nursing team  Muscle skeletal-patient with contractures of the lower extremities with knees flexed  Neuro-a little bit restless, moves all extremities but minimally verbal    Patient being admitted for Respite care x 5 days for   [x]  Caregiver exhaustion and needing break  []  Caregiver unavailable       PLAN   Patient's home medications were reviewed and reconciled. Continue with current home medications and plan of care as outlined in chart. Patient appears to be declining related to her Alzheimer's dementia.   Comfort meds have been ordered sublingual at this time  Stop most other medications except for metoprolol if she can tolerate to help with tachycardia and blood pressure. Continue to monitor closely for symptom burden-high risk to have a significant decline here at the hospice house and may transition to [facility] level care     and SW to support family needs. Disposition: Home with hospice once respite stay is completed.

## 2022-08-30 NOTE — PROGRESS NOTES
Problem: Falls - Risk of  Goal: *Absence of Falls  Description: Document Anish Henderson Fall Risk and appropriate interventions in the flowsheet. Outcome: Progressing Towards Goal  Note: Fall Risk Interventions:       Mentation Interventions: Bed/chair exit alarm    Medication Interventions: Bed/chair exit alarm    Elimination Interventions: Call light in reach, Bed/chair exit alarm              Problem: Patient Education: Go to Patient Education Activity  Goal: Patient/Family Education  Outcome: Progressing Towards Goal     Problem: Potential for Alteration in Skin Integrity  Goal: Monitor skin for areas of alteration in skin integrity  Description: Patient/family/caregiver will demonstrate ability to care for patient's skin, monitor for areas of breakdown, and demonstrate methods to prevent breakdown during hospice care. Outcome: Progressing Towards Goal     Problem: Pain  Goal: Assess satisfaction of level of comfort and symptom control  Outcome: Progressing Towards Goal  Goal: *Control of acute pain  Outcome: Progressing Towards Goal     Problem: Pressure Injury - Risk of  Goal: *Prevention of pressure injury  Outcome: Progressing Towards Goal  Note: Pressure Injury Interventions:                                            Problem: General Wound Care  Goal: *Infected Wound: Prevention of further infection  Description: Infection control procedures (eg: clean dressings, clean gloves, hand washing, precautions to isolate wound from contamination, sterile instruments used for wound debridement) should be implemented.   Outcome: Progressing Towards Goal  Goal: Interventions  Outcome: Progressing Towards Goal

## 2022-08-30 NOTE — PROGRESS NOTES
SHIFT REPORT  1900-Handoff report received from [place], [address] [place] [address]    1930-Patient resting quietly in bed with eyes closed     2000-Shift assessment completed, see flow sheets    2212-PRN Morphine and Lorazepam administered. 0010-Bed bath performed. Patient turned and repositioned. 0200-Patient resting quietly in bed with eyes closed     0330-*PRN Lorazepam and Morphine given by Raza Oscar RN (See progress notes)    0400-Patient shows no improvement after SL PRN meds administered. Patient RR still in the 30s and abdominal breathing noted, Next PRN available at 0430. If next PRN does not assist with symptoms, On-call NP will be contacted     0430-PRN SL Lorazepam and Morphine given based on RR elevated in the 30s with continued abdominal breathing noted. 0500-Patient reassessed with no improvement. RR is 38 with continued abdominal breathing. Will contact on-call provider    0515-Received telephone orders from on-call NP. Modified SL 10 mg Morphine and 2 mg Lorazepam orders placed to assist with patient's dyspnea. Also received telephone order to attempt IV access    0529-Attempted to administer SL Morphine and Lorazepam but patient was unable to tolerate due to coughing. She only received 1 mg of Lorazepam when she starting coughing. No further SL meds given at this point. New IV placed in R hand 24g. Waste was witnessed by Trigemina. [id number] NP contacted by Raza Oscar RN. New PRN IV 2 mg Morphine Q 30 min and 0.5 mg Q 30 min Lorazepam orders placed. **NOTE: PRN IV 3mg Morphine Q 30 min and 1 mg Q 30 min Lorazepam can be ordered if patient is not comfortable     0605-PRN IV Morphine and Lorazepam administered by Raza Oscar RN    0645-Patient changed to NPO status due to not tolerating oral intake. 0650-Patient's son called. Raza Oscar RN updated him on patient status. 0700-Handoff report given to [facility].      NAME OF PATIENT:  Cruz Knox    LEVEL OF CARE:  Respite     REASON FOR GIP: N/A    *PATIENT REMAINS ELIGIBLE FOR GIP LEVEL OF CARE AS EVIDENCED BY: (MUST BE ADDRESSED OF PATIENT GIP)      REASON FOR RESPITE:  Caregiver breakdown    O2 SAFETY:  N/A    FALL INTERVENTIONS PROVIDED:   Implemented/recommended use of non-skid footwear, Implemented/recommended use of fall risk identification flag to all team members, Implemented/recommended assistive devices and encouraged their use, Implemented/recommended resources for alarm system (personal alarm, bed alarm, call bell, etc.) , Implemented/recommended environmental changes (remove hazards, lower bed, improve lighting, etc.), and Implemented/recommended increased supervision/assistance    INTERDISPLINARY COMMUNICATION/COLLABORATION:  Physician, MARGO, Deborah Oden, and RNBHAVIN    NEW MEDICATION INITIATION DOCUMENTATION:  N/A    Reason medication is being initiated:  N/A    MD / Provider name consulted re: change in status / initiation of new medication:  N/A    New Symptom(s):  N/A    New Order(s):  N/A    Name of the person notified of the changes:  N/A    Name of person being taught:  N/A    Instructions given:  N/A    Side Effects taught:  N/A    Response to teaching:  N/A      COMFORTABLE DYING MEASURE:  Is Patient/family satisfied with symptom level?  yes    DISCHARGE PLAN:  Home or end of life based on patient status

## 2022-08-30 NOTE — HOSPICE
Hospice RN visit with NP Morales Gamboa. Patient's daughter Yovany Duke and granddaughter Florecita Spear present. Patient in bed, eyes open, speech nonsensical but at one point did state \"I'm not going anywhere\". Patint had removed her Catherine [place] states she has been doing this lately. Cries out in pain with movement. Florecita Spear reports patient drank 1/2 can Sprite today and had a little applesauce, still taking crushed pills. Med rec completed with NP Morales Gamboa, several meds removed. Wound to sacrum assessed, very foul odor, large tan drainage. Attempted Xeroform with Tegaderm to secure but area too moist and difficult to secure. Small foam dressings present in the home, applied 2. New diaper applied, gown put back on and patient repositioned. Granddaughter tearful during visit, struggling with patient's care and status. NP reviewed respite stay for patient which family agree to. Covid test admininstered, results negative. Forms signed by Florecita Spear. Bed available at [facility] today, transportation arranged by Brice Rizo for 3pm.  Report called to [facility] by RN at 2:35pm.  Reviewed with family to take patient's POST form and medications.

## 2022-08-31 NOTE — PROGRESS NOTES
0330 Patient breathing 38 breaths/minute with abdominal muscle use. Patient moaning when head of bed elevated. PRN Lorazepam and Morphine given. Primary nurse Hortencia Juarez RN updated.

## 2022-08-31 NOTE — PROGRESS NOTES
Problem: Falls - Risk of  Goal: *Absence of Falls  Description: Document Jarret Zambrano Fall Risk and appropriate interventions in the flowsheet. Outcome: Progressing Towards Goal  Note: Fall Risk Interventions:       Mentation Interventions: Bed/chair exit alarm    Medication Interventions: Bed/chair exit alarm    Elimination Interventions: Call light in reach, Bed/chair exit alarm              Problem: Patient Education: Go to Patient Education Activity  Goal: Patient/Family Education  Outcome: Progressing Towards Goal     Problem: Potential for Alteration in Skin Integrity  Goal: Monitor skin for areas of alteration in skin integrity  Description: Patient/family/caregiver will demonstrate ability to care for patient's skin, monitor for areas of breakdown, and demonstrate methods to prevent breakdown during hospice care. Outcome: Progressing Towards Goal     Problem: Pain  Goal: Assess satisfaction of level of comfort and symptom control  Outcome: Progressing Towards Goal  Goal: *Control of acute pain  Outcome: Progressing Towards Goal     Problem: Pressure Injury - Risk of  Goal: *Prevention of pressure injury  Outcome: Progressing Towards Goal  Note: Pressure Injury Interventions:                                            Problem: General Wound Care  Goal: *Infected Wound: Prevention of further infection  Description: Infection control procedures (eg: clean dressings, clean gloves, hand washing, precautions to isolate wound from contamination, sterile instruments used for wound debridement) should be implemented.   Outcome: Progressing Towards Goal  Goal: Interventions  Outcome: Progressing Towards Goal

## 2022-08-31 NOTE — PROGRESS NOTES
[facility]  [phone]    Patient Name: Kathe Lucas  YOB: 1930    Date of Provider Hospice Visit: 08/31/22    Level of Care:   [] General Inpatient (GIP)    [] Routine   [x] Respite    Current Location of Care:  [] [facility] [] [facility] [] [facility] [] [facility] [x] Hospice House [facility]      Principle Hospice Diagnosis: Alzheimer's dementia       HOSPICE SUMMARY     Chart Reviewed for patient's medical history and hospice care plan. Hospice Physician Certification/Recertification Narrative per Rishi Savage / other MD:     Patient sent to the [facility] hospice house for respite level care secondary to caregiver exhaustion. In addition, patient has been declining in the home with increasing wounds. She has had decreased p.o. intake, increased confusion and currently on service for Alzheimer's dementia. The team visited earlier today patient appeared to show increasing symptoms of restlessness and pain. Patient does have a sacral wound which has been covered. 8/31-patient with very difficult night with increased restlessness, SOB and nonverbal signs of pain. IV started and multiple prn doses of ativan and morphine required    General-ill-appearing, minimally responsive, no grimacing currently(just received meds)  HEENT-slightly dry oral mucosa  Lungs-clear to auscultation, increased WOB earlier with RR in the 40s overnight, minimal secretions  Cardiovascularly-appears regular rate and rhythm  Abdomen-soft, thin, nontender  Extremities-no clubbing or stenosis, slightly cool extremities  Derm-sacral wound is covered, small amount of drainage per bedside nursing team  Muscle skeletal-patient with contractures of the lower extremities with knees flexed  Neuro-calm currently    Hospice DX  SOB  Restlessness  Nonverbal signs of pain         PLAN   Patient changed to GIP LOC and not tolerating oral/SL meds overnight.  Needs IV medication management, frequent nursing assessments  Morphine 2 mg IV every 4 scheduled and every 15 min prn  Versed 2 mg IV every 4 and every 15 min prn  Stop all oral meds  Comfort meds  Plan reviewed with bedside nursing team  Family has been called and also visited overnight. Later, met with patient's 2 granddaughters and grandson. They are very happy with the care patient is receiving and thankful that Ms. Angelica Strickland appears comfortable. Reviewed the plan of care and medication management     and SW to support family needs.   Disposition: Death at [facility]

## 2022-08-31 NOTE — PROGRESS NOTES
0700  report received. 0715  Pt lying in bed, unresponsive. No facial grimacing or moaning. Lungs clear but diminished. No cough noted. Bowel sounds faint and distant. Abd is soft and nontender. Last reported BM is unknown. Howe is draining cloudy mirna foul smelling urine. No edema noted. Dressing on sacrum is dry and intact. Skin is warm to touch. Feet cool to touch. 0755  Pt medicated with Lorazepam and Morphine for RR of 36. Pt  using accessory to get air in.  0815  RR shallow 26  Pt more relaxed. 4428  Pt medicated with scheduled meds. Granddaughter at the bedside. Rn updated her on medication changes and changes in pt's status. Bar Eldridge stated her GrandMother looked very peaceful. 1030  Pt resting. 1130  Pt turned and repositioned. No moaning, No grimacing. No yelling.out. Granddaughter very happy with pt's comfort level. 1215  Pt relaxed. 1300 Pt medicated with scheduled meds. 1410  Pt repositioned. No grimacing. Respirations shallow  1515  Pt relaxed    1630  Pt repositioned, Mouth care provided. Pt  drooling on her pillow. Skin cool to touch. Pt relaxed. 1711  Pt medicated with scheduled meds. Pt relaxed. 1830 Pt turned and repositioned. No grimacing. No urine output. Pt relaxed. 1900  report given.       NAME OF PATIENT:  Josemanuel Choi     LEVEL OF CARE:  Respite     REASON FOR GIP:  n/a     *PATIENT REMAINS ELIGIBLE FOR GIP LEVEL OF CARE AS EVIDENCED BY: (MUST BE ADDRESSED OF PATIENT GIP)  n/a        REASON FOR RESPITE:  Caregiver cannot care for patient due to:  exhaustion     O2 SAFETY:  RA     FALL INTERVENTIONS PROVIDED:   Implemented/recommended assistive devices and encouraged their use, Implemented/recommended resources for alarm system (personal alarm, bed alarm, call bell, etc.) , and Implemented/recommended environmental changes (remove hazards, lower bed, improve lighting, etc.)     INTERDISPLINARY COMMUNICATION/COLLABORATION:  Physician, MSW, Bong, and RN, CNA     NEW MEDICATION INITIATION DOCUMENTATION:  No new medications initiated. Reason medication is being initiated:  n/a     MD / Provider name consulted re: change in status / initiation of new medication:  n/a     New Symptom(s):  n/a     New Order(s):  n/a     Name of the person notified of the changes:  n/a     Name of person being taught:  n/a     Instructions given:  n/a     Side Effects taught:  n/a     Response to teaching:  n/a        COMFORTABLE DYING MEASURE:  Is Patient/family satisfied with symptom level?  yes     DISCHARGE PLAN:  Pt will remain at the [facility] for EOL care. .  If she should stabilize, Pt will return home and continue to be followed by Home Hospice.

## 2022-08-31 NOTE — PROGRESS NOTES
0530 Assisted Rochelle GAMBOA with administering patient's increased SL PRN medications. Patient was coughing immediately and unable to tolerate. She only received half of the ordered dose of Lorazepam. Patient is still breathing with accessory muscles and RR in the upper 30s. 0540 On call NP notified. IV access was obtained by Anders Lesch RN. [phone] with on call NP and new IV medication orders received to help with patient's continued dyspnea. Patient started on Morphine IV 2mg every 30 minutes PRN and Lorazepam IV 0.5mg every 30 minutes PRN. Primary RN Anders Lesch updated on new medication orders received.     0605 PRN IV Morphine and Lorazepam given for patients continued dyspnea and abdominal breathing. Primary RN Anders Lesch updated.

## 2022-08-31 NOTE — PROGRESS NOTES
Problem: Falls - Risk of  Goal: *Absence of Falls  Description: Document Leafy Rg Fall Risk and appropriate interventions in the flowsheet. Outcome: Progressing Towards Goal  Note: Fall Risk Interventions:       Mentation Interventions: Bed/chair exit alarm, Door open when patient unattended    Medication Interventions: Bed/chair exit alarm    Elimination Interventions: Bed/chair exit alarm, Call light in reach              Problem: Patient Education: Go to Patient Education Activity  Goal: Patient/Family Education  Outcome: Progressing Towards Goal     Problem: Potential for Alteration in Skin Integrity  Goal: Monitor skin for areas of alteration in skin integrity  Description: Patient/family/caregiver will demonstrate ability to care for patient's skin, monitor for areas of breakdown, and demonstrate methods to prevent breakdown during hospice care.   Outcome: Progressing Towards Goal     Problem: Pain  Goal: Assess satisfaction of level of comfort and symptom control  Outcome: Progressing Towards Goal  Goal: *Control of acute pain  Outcome: Progressing Towards Goal     Problem: Pressure Injury - Risk of  Goal: *Prevention of pressure injury  Outcome: Progressing Towards Goal  Note: Pressure Injury Interventions:  Sensory Interventions: Assess changes in LOC, Check visual cues for pain    Moisture Interventions: Maintain skin hydration (lotion/cream), Apply protective barrier, creams and emollients    Activity Interventions: Pressure redistribution bed/mattress(bed type)    Mobility Interventions: HOB 30 degrees or less    Nutrition Interventions: Offer support with meals,snacks and hydration    Friction and Shear Interventions: Apply protective barrier, creams and emollients, HOB 30 degrees or less, Lift sheet                Problem: General Wound Care  Goal: *Infected Wound: Prevention of further infection  Description: Infection control procedures (eg: clean dressings, clean gloves, hand washing, precautions to isolate wound from contamination, sterile instruments used for wound debridement) should be implemented. Outcome: Progressing Towards Goal  Goal: Interventions  Outcome: Progressing Towards Goal     Problem: Pressure Injury - Risk of  Goal: *Prevention of pressure injury  Description: Document Evgeny Scale and appropriate interventions in the flowsheet.   Outcome: Progressing Towards Goal  Note: Pressure Injury Interventions:  Sensory Interventions: Assess changes in LOC, Check visual cues for pain    Moisture Interventions: Maintain skin hydration (lotion/cream), Apply protective barrier, creams and emollients    Activity Interventions: Pressure redistribution bed/mattress(bed type)    Mobility Interventions: HOB 30 degrees or less    Nutrition Interventions: Offer support with meals,snacks and hydration    Friction and Shear Interventions: Apply protective barrier, creams and emollients, HOB 30 degrees or less, Lift sheet                Problem: Patient Education: Go to Patient Education Activity  Goal: Patient/Family Education  Outcome: Progressing Towards Goal

## 2022-09-01 NOTE — HOSPICE
190 Report received from [place], [address] [facility] Patient resting in bed, eyes are closed, unresponsive to all stimuli. RN assessment and vital signs completed. Skin is cold. Lung sounds coarse.  Administered scheduled IV versed and morphine through left arm subcutaneous IV. Administered prn tylenol for suppository. [place] removed and fan turned on.  2100 Inserted #22 PIV in patients right arm.  221 Patients skin is cold. Axillary temp 99.6.  2300 Patient given bed bath, linen changed, mouth care provided. Repositioned onto right side.  Patient resting with eyes closed, respirations shallow, unlabored. Neutral facial expression. Patient absent of heart sounds and spontaneous respirations for two minutes. TOD 0005. Daughters Anna Vega and Solomon Hobson, and Niece Yancy Bob notified by phone of patients passing. 36 Spoke with granddaughter Yancy Bob, family has chosen [facility] to serve.     NAME OF PATIENT:  Ryanne Boss    LEVEL OF CARE:  GIP    REASON FOR GIP:   Pain, despite numerous changes in medications, Unmanageable respiratory distress, Medication adjustment that must be monitored 24/, and Stabilizing treatment that cannot take place at home    *PATIENT REMAINS ELIGIBLE FOR [facility] LEVEL OF CARE AS EVIDENCED BY: (MUST BE ADDRESSED OF PATIENT GIP)      REASON FOR RESPITE:  N/a    O2 SAFETY:  N/a    FALL INTERVENTIONS PROVIDED:   Implemented/recommended resources for alarm system (personal alarm, bed alarm, call bell, etc.) , Implemented/recommended environmental changes (remove hazards, lower bed, improve lighting, etc.), and Implemented/recommended increased supervision/assistance    INTERDISPLINARY COMMUNICATION/COLLABORATION:  Physician, Juan ARAGON, and RN, CNA    NEW MEDICATION INITIATION DOCUMENTATION:  N/a    Reason medication is being initiated:  n/a    MD / Provider name consulted re: change in status / initiation of new medication:  n/a    New Symptom(s):  n/a    New Order(s):  n/a    Name of the person notified of the changes:  n/a    Name of person being taught:  n/a    Instructions given:  n/a    Side Effects taught:  n/a    Response to teaching:  n/a      COMFORTABLE DYING MEASURE:  Is Patient/family satisfied with symptom level?  yes    DISCHARGE PLAN:  Patient actively dying and likely to pass at [facility]. Should she stabilize, will return home.

## 2022-09-03 ENCOUNTER — HOME CARE VISIT (OUTPATIENT)
Dept: HOSPICE | Facility: HOSPICE | Age: 87
End: 2022-09-03
Payer: MEDICARE

## 2023-05-15 NOTE — ED NOTES
I have reviewed discharge instructions with the patient and daughter. The daughter verbalized understanding. Discharge medications reviewed with patient  & daughter and appropriate educational materials and side effects teaching were provided. Patient left ED via wheelchair with medical transport. Patient tolerated procedure well.